# Patient Record
Sex: MALE | Race: WHITE | NOT HISPANIC OR LATINO
[De-identification: names, ages, dates, MRNs, and addresses within clinical notes are randomized per-mention and may not be internally consistent; named-entity substitution may affect disease eponyms.]

---

## 2024-05-30 ENCOUNTER — RESULT REVIEW (OUTPATIENT)
Age: 89
End: 2024-05-30

## 2024-06-06 PROBLEM — Z00.00 ENCOUNTER FOR PREVENTIVE HEALTH EXAMINATION: Status: ACTIVE | Noted: 2024-06-06

## 2024-06-19 ENCOUNTER — APPOINTMENT (OUTPATIENT)
Dept: CARDIOLOGY | Facility: CLINIC | Age: 89
End: 2024-06-19

## 2024-07-15 ENCOUNTER — INPATIENT (INPATIENT)
Facility: HOSPITAL | Age: 89
LOS: 14 days | Discharge: NURSING, REHAB OR LONG-TERM CARE | DRG: 321 | End: 2024-07-30
Attending: HOSPITALIST | Admitting: HOSPITALIST
Payer: MEDICARE

## 2024-07-15 VITALS
HEART RATE: 132 BPM | SYSTOLIC BLOOD PRESSURE: 141 MMHG | DIASTOLIC BLOOD PRESSURE: 75 MMHG | OXYGEN SATURATION: 96 % | RESPIRATION RATE: 24 BRPM | TEMPERATURE: 98 F

## 2024-07-15 LAB
BASOPHILS # BLD AUTO: 0.07 K/UL — SIGNIFICANT CHANGE UP (ref 0–0.2)
BASOPHILS NFR BLD AUTO: 0.6 % — SIGNIFICANT CHANGE UP (ref 0–2)
EOSINOPHIL # BLD AUTO: 0.32 K/UL — SIGNIFICANT CHANGE UP (ref 0–0.5)
EOSINOPHIL NFR BLD AUTO: 2.9 % — SIGNIFICANT CHANGE UP (ref 0–6)
HCT VFR BLD CALC: 43.1 % — SIGNIFICANT CHANGE UP (ref 39–50)
HGB BLD-MCNC: 14.4 G/DL — SIGNIFICANT CHANGE UP (ref 13–17)
IMM GRANULOCYTES NFR BLD AUTO: 0.5 % — SIGNIFICANT CHANGE UP (ref 0–0.9)
LYMPHOCYTES # BLD AUTO: 19 % — SIGNIFICANT CHANGE UP (ref 13–44)
LYMPHOCYTES # BLD AUTO: 2.1 K/UL — SIGNIFICANT CHANGE UP (ref 1–3.3)
MCHC RBC-ENTMCNC: 29.6 PG — SIGNIFICANT CHANGE UP (ref 27–34)
MCHC RBC-ENTMCNC: 33.4 GM/DL — SIGNIFICANT CHANGE UP (ref 32–36)
MCV RBC AUTO: 88.5 FL — SIGNIFICANT CHANGE UP (ref 80–100)
MONOCYTES # BLD AUTO: 0.76 K/UL — SIGNIFICANT CHANGE UP (ref 0–0.9)
MONOCYTES NFR BLD AUTO: 6.9 % — SIGNIFICANT CHANGE UP (ref 2–14)
NEUTROPHILS # BLD AUTO: 7.77 K/UL — HIGH (ref 1.8–7.4)
NEUTROPHILS NFR BLD AUTO: 70.1 % — SIGNIFICANT CHANGE UP (ref 43–77)
PLATELET # BLD AUTO: 279 K/UL — SIGNIFICANT CHANGE UP (ref 150–400)
RBC # BLD: 4.87 M/UL — SIGNIFICANT CHANGE UP (ref 4.2–5.8)
RBC # FLD: 15.1 % — HIGH (ref 10.3–14.5)
WBC # BLD: 11.07 K/UL — HIGH (ref 3.8–10.5)
WBC # FLD AUTO: 11.07 K/UL — HIGH (ref 3.8–10.5)

## 2024-07-15 PROCEDURE — 99053 MED SERV 10PM-8AM 24 HR FAC: CPT

## 2024-07-15 PROCEDURE — 99285 EMERGENCY DEPT VISIT HI MDM: CPT

## 2024-07-15 PROCEDURE — 70450 CT HEAD/BRAIN W/O DYE: CPT | Mod: 26,MC

## 2024-07-15 PROCEDURE — 93010 ELECTROCARDIOGRAM REPORT: CPT

## 2024-07-15 PROCEDURE — 70486 CT MAXILLOFACIAL W/O DYE: CPT | Mod: 26,MC

## 2024-07-15 PROCEDURE — 72125 CT NECK SPINE W/O DYE: CPT | Mod: 26,MC

## 2024-07-15 RX ORDER — DILTIAZEM HCL 90 MG
10 TABLET ORAL ONCE
Refills: 0 | Status: COMPLETED | OUTPATIENT
Start: 2024-07-15 | End: 2024-07-15

## 2024-07-15 RX ORDER — METOPROLOL TARTRATE 100 MG
5 TABLET ORAL ONCE
Refills: 0 | Status: DISCONTINUED | OUTPATIENT
Start: 2024-07-15 | End: 2024-07-15

## 2024-07-15 RX ORDER — ONDANSETRON HCL/PF 4 MG/2 ML
4 VIAL (ML) INJECTION ONCE
Refills: 0 | Status: COMPLETED | OUTPATIENT
Start: 2024-07-15 | End: 2024-07-15

## 2024-07-15 RX ORDER — ACETAMINOPHEN 500 MG
1000 TABLET ORAL ONCE
Refills: 0 | Status: COMPLETED | OUTPATIENT
Start: 2024-07-15 | End: 2024-07-15

## 2024-07-15 RX ADMIN — Medication 10 MILLIGRAM(S): at 23:42

## 2024-07-15 RX ADMIN — Medication 400 MILLIGRAM(S): at 23:59

## 2024-07-15 RX ADMIN — Medication 4 MILLIGRAM(S): at 23:35

## 2024-07-15 NOTE — ED PROVIDER NOTE - CARE PLAN
1 Principal Discharge DX:	Syncope  Secondary Diagnosis:	Subdural hematoma   Principal Discharge DX:	Syncope  Secondary Diagnosis:	Subdural hematoma  Secondary Diagnosis:	Atrial fibrillation with rapid ventricular response

## 2024-07-15 NOTE — ED PROVIDER NOTE - CHIEF COMPLAINT
Hub staff attempted to follow warm transfer process and was unsuccessful     Caller: SANDRA    Relationship to patient: HOME HEALTH     Best call back number:     Patient is needing: SANDRA WANTED MACHO TO KNOW THAT SHE TOLD THE PATIENT ABOUT THE APPT THAT WAS RESCHEDULED AND SHE TOLD HER SHE COULDN'T MAKE IT. SANDRA GAVE THE PATIENT THE PHONE NUMBER TO THE OFFICE TO CALL AND RESCHEDULE IF SHE NEEDS TO.     The patient is a 89y Male complaining of syncope.

## 2024-07-15 NOTE — ED PROVIDER NOTE - PHYSICAL EXAMINATION
General: NAD, well appearing  HEENT: bleeding from anterior nose and upper lip with loose upper central incisor with bleeding  Neck: No apparent stiffness or JVD  Pulm: Chest wall symmetric and nontender, lungs clear to ascultation   Cardiac: rapid and irregular heartrate, no murmurs rubs or gallops  Abdomen: Nontender and nondistended  Skin: Skin is warm, dry and intact without rashes or lesions.  Neuro: No motor or sensory deficits above reported baseline  MSK: No deformity or tenderness above reported baseline

## 2024-07-15 NOTE — ED ADULT NURSE NOTE - CHIEF COMPLAINT QUOTE
pt is a transfer from [facility] [place], pt had a syncopal episode and fell on his face.  has abrasions to face and upper extremities and a located finger.  pt has a questionable head bleed.  pt recently hospitalized at [facility] [place] for new onset afib. pt received cardizem, ofirmev and tetanus.  pt moved to

## 2024-07-15 NOTE — ED PROVIDER NOTE - OBJECTIVE STATEMENT
Pt is an 89 yo male with PMH of afib on eliquis, hx of multiple syncopes this month presenting as a transfer from [facility] [place] for sudural hematoma. Pt was in front of the sink when he felt dizzy and lightheaded and turned around and fell face forward onto the ground with LOC. Pt reports pain in upper mouth with loose tooth. Pt presented with afib with rvr with stable BP with associated nausea and vomiting. Pt denies fever, SOB, chest pain, palpitations, abdominal pain, back pain, neck pain.

## 2024-07-15 NOTE — ED PROVIDER NOTE - ATTENDING CONTRIBUTION TO CARE
89 yo male with PMH of afib on Eliquis, hx of multiple syncope episodes this month presenting as a transfer from [facility] for subdural hematoma. Pt was in front of the sink when he felt dizzy and lightheaded. He turned to sit down and fell face forward onto the ground with LOC. Pt reports pain in upper mouth with loose Pt states he has been well denies fever, SOB, chest pain, palpitations, abdominal pain, back pain, neck pain, GI symptoms or new concerns.    I, Hayley Giles, personally saw the patient with the resident, and completed the key components of the history and physical exam. I then discussed the management plan with the resident.

## 2024-07-15 NOTE — ED ADULT NURSE NOTE - NSFALLHARMRISKINTERV_ED_ALL_ED

## 2024-07-15 NOTE — ED ADULT TRIAGE NOTE - CHIEF COMPLAINT QUOTE
[place] [facility] [place]opal episode and fell on his face.  has abrasions to face and upper extremities and a located finger.  pt has a questionable head bleed.  [place] [facility] [place]. pt received cardizem, ofirmev and tetanus.  pt moved to

## 2024-07-15 NOTE — ED ADULT NURSE NOTE - OBJECTIVE STATEMENT
Pt. received A+Ox4, NAD, BIBEMS after falling and being brought [place] [facility] [place]. As per EMS pt. had syncopal episode leading to fall. Pt. is on eliquis. Pt. with splinted L middle finger, tooth impaction in mouth, transferred for MRI and trauma eval. Pt. with no gross deficits. Pt. received A+Ox4, NAD, BIBEMS after falling and [place] [facility] [place] pt. had syncopal episode leading to fall. Pt. is on eliquis. Pt. with splinted L middle finger, tooth impaction in mouth, transferred for MRI and trauma eval. Pt. with no gross deficits. Pt. in a fib on monitor, MD aware.

## 2024-07-15 NOTE — ED PROVIDER NOTE - CLINICAL SUMMARY MEDICAL DECISION MAKING FREE TEXT BOX
Pt is an 88 yo male with PMH of afib on eliquis, hx of multiple syncopes this month presenting as a transfer [place] [facility] [place]. Pt was in front of the sink when he felt dizzy and lightheaded and turned around and fell face forward onto the ground with LOC. Pt reports pain in upper mouth with loose tooth. Pt also had a L middle finger dislocation. Pt presented with afib with rvr with stable BP with associated nausea and vomiting. Pt denies fever, SOB, chest pain, palpitations, abdominal pain, back pain, neck pain.     VS shows irregular fast HR, ekg shows afib with rvr, hypertensive, satting well, afebrile. neuro intact, with lacerations to nose and inner upper mouth and lip.     syncope workup, acs, CT priority head as per trauma team after their initial assessment, NS evaluated pt and ct imaging, recommending after ct head after 6hours. Pt is an 88 yo male with PMH of afib on eliquis, hx of multiple syncopes this month [place] [facility] [place] Pt was in front of the sink when he felt dizzy and lightheaded and turned around and fell face forward onto the ground with LOC. Pt reports pain in upper mouth with loose tooth. Pt also had a L middle finger dislocation. Pt presented with afib with rvr with stable BP with associated nausea and vomiting. Pt denies fever, SOB, chest pain, palpitations, abdominal pain, back pain, neck pain.     VS shows irregular fast HR, ekg shows afib with rvr, hypertensive, satting well, afebrile. neuro intact, with lacerations to nose and inner upper mouth and lip.     syncope workup, acs, CT priority head as per trauma team after their initial assessment, NS evaluated pt and ct imaging, recommending after ct head after 6hours.    Admitted for syncope workup to medicine. Neurosurgery consulted and recommending q2h neurochecks.

## 2024-07-16 DIAGNOSIS — R55 SYNCOPE AND COLLAPSE: ICD-10-CM

## 2024-07-16 LAB
A1C WITH ESTIMATED AVERAGE GLUCOSE RESULT: 6.4 % — HIGH (ref 4–5.6)
ALBUMIN SERPL ELPH-MCNC: 3.6 G/DL — SIGNIFICANT CHANGE UP (ref 3.3–5.2)
ALP SERPL-CCNC: 64 U/L — SIGNIFICANT CHANGE UP (ref 40–120)
ALT FLD-CCNC: 11 U/L — SIGNIFICANT CHANGE UP
ANION GAP SERPL CALC-SCNC: 16 MMOL/L — SIGNIFICANT CHANGE UP (ref 5–17)
APPEARANCE UR: ABNORMAL
AST SERPL-CCNC: 19 U/L — SIGNIFICANT CHANGE UP
BACTERIA # UR AUTO: ABNORMAL /HPF
BILIRUB SERPL-MCNC: 0.7 MG/DL — SIGNIFICANT CHANGE UP (ref 0.4–2)
BILIRUB UR-MCNC: NEGATIVE — SIGNIFICANT CHANGE UP
BUN SERPL-MCNC: 17 MG/DL — SIGNIFICANT CHANGE UP (ref 8–20)
CALCIUM SERPL-MCNC: 9 MG/DL — SIGNIFICANT CHANGE UP (ref 8.4–10.5)
CHLORIDE SERPL-SCNC: 99 MMOL/L — SIGNIFICANT CHANGE UP (ref 96–108)
CO2 SERPL-SCNC: 23 MMOL/L — SIGNIFICANT CHANGE UP (ref 22–29)
COLOR SPEC: ABNORMAL
CREAT SERPL-MCNC: 1.05 MG/DL — SIGNIFICANT CHANGE UP (ref 0.5–1.3)
DIFF PNL FLD: ABNORMAL
EGFR: 69 ML/MIN/1.73M2 — SIGNIFICANT CHANGE UP
ESTIMATED AVERAGE GLUCOSE: 137 MG/DL — HIGH (ref 68–114)
GLUCOSE SERPL-MCNC: 126 MG/DL — HIGH (ref 70–99)
GLUCOSE UR QL: NEGATIVE MG/DL — SIGNIFICANT CHANGE UP
HCT VFR BLD CALC: 44 % — SIGNIFICANT CHANGE UP (ref 39–50)
HGB BLD-MCNC: 14.2 G/DL — SIGNIFICANT CHANGE UP (ref 13–17)
KETONES UR-MCNC: NEGATIVE MG/DL — SIGNIFICANT CHANGE UP
LEUKOCYTE ESTERASE UR-ACNC: ABNORMAL
MCHC RBC-ENTMCNC: 29.5 PG — SIGNIFICANT CHANGE UP (ref 27–34)
MCHC RBC-ENTMCNC: 32.3 GM/DL — SIGNIFICANT CHANGE UP (ref 32–36)
MCV RBC AUTO: 91.5 FL — SIGNIFICANT CHANGE UP (ref 80–100)
NITRITE UR-MCNC: NEGATIVE — SIGNIFICANT CHANGE UP
OB PNL STL: NEGATIVE — SIGNIFICANT CHANGE UP
PH UR: 6 — SIGNIFICANT CHANGE UP (ref 5–8)
PLATELET # BLD AUTO: 237 K/UL — SIGNIFICANT CHANGE UP (ref 150–400)
POTASSIUM SERPL-MCNC: 3.8 MMOL/L — SIGNIFICANT CHANGE UP (ref 3.5–5.3)
POTASSIUM SERPL-SCNC: 3.8 MMOL/L — SIGNIFICANT CHANGE UP (ref 3.5–5.3)
PROT SERPL-MCNC: 7.4 G/DL — SIGNIFICANT CHANGE UP (ref 6.6–8.7)
PROT UR-MCNC: SIGNIFICANT CHANGE UP MG/DL
RBC # BLD: 4.81 M/UL — SIGNIFICANT CHANGE UP (ref 4.2–5.8)
RBC # FLD: 15.4 % — HIGH (ref 10.3–14.5)
RBC CASTS # UR COMP ASSIST: 30 /HPF — SIGNIFICANT CHANGE UP (ref 0–4)
SODIUM SERPL-SCNC: 138 MMOL/L — SIGNIFICANT CHANGE UP (ref 135–145)
SP GR SPEC: 1.01 — SIGNIFICANT CHANGE UP (ref 1–1.03)
TROPONIN T, HIGH SENSITIVITY RESULT: 42 NG/L — SIGNIFICANT CHANGE UP (ref 0–51)
TROPONIN T, HIGH SENSITIVITY RESULT: 42 NG/L — SIGNIFICANT CHANGE UP (ref 0–51)
UROBILINOGEN FLD QL: 1 MG/DL — SIGNIFICANT CHANGE UP (ref 0.2–1)
WBC # BLD: 11.27 K/UL — HIGH (ref 3.8–10.5)
WBC # FLD AUTO: 11.27 K/UL — HIGH (ref 3.8–10.5)
WBC UR QL: 35 /HPF — SIGNIFICANT CHANGE UP (ref 0–5)

## 2024-07-16 PROCEDURE — 99233 SBSQ HOSP IP/OBS HIGH 50: CPT

## 2024-07-16 PROCEDURE — 99231 SBSQ HOSP IP/OBS SF/LOW 25: CPT

## 2024-07-16 PROCEDURE — 70450 CT HEAD/BRAIN W/O DYE: CPT | Mod: 26,MC

## 2024-07-16 PROCEDURE — 71045 X-RAY EXAM CHEST 1 VIEW: CPT | Mod: 26

## 2024-07-16 RX ORDER — PANTOPRAZOLE SODIUM 20 MG/1
40 TABLET, DELAYED RELEASE ORAL
Refills: 0 | Status: DISCONTINUED | OUTPATIENT
Start: 2024-07-16 | End: 2024-07-30

## 2024-07-16 RX ORDER — PANTOPRAZOLE SODIUM 20 MG/1
40 TABLET, DELAYED RELEASE ORAL ONCE
Refills: 0 | Status: COMPLETED | OUTPATIENT
Start: 2024-07-16 | End: 2024-07-16

## 2024-07-16 RX ORDER — SACUBITRIL AND VALSARTAN 49; 51 MG/1; MG/1
1 TABLET, FILM COATED ORAL
Refills: 0 | Status: DISCONTINUED | OUTPATIENT
Start: 2024-07-16 | End: 2024-07-19

## 2024-07-16 RX ORDER — MELATONIN 3 MG
5 TABLET ORAL ONCE
Refills: 0 | Status: COMPLETED | OUTPATIENT
Start: 2024-07-16 | End: 2024-07-16

## 2024-07-16 RX ORDER — APIXABAN 5 MG/1
2.5 TABLET, FILM COATED ORAL
Refills: 0 | Status: DISCONTINUED | OUTPATIENT
Start: 2024-07-16 | End: 2024-07-18

## 2024-07-16 RX ORDER — FUROSEMIDE 10 MG/ML
20 INJECTION, SOLUTION INTRAVENOUS DAILY
Refills: 0 | Status: DISCONTINUED | OUTPATIENT
Start: 2024-07-16 | End: 2024-07-16

## 2024-07-16 RX ORDER — DIGOXIN 125 MCG
500 TABLET ORAL ONCE
Refills: 0 | Status: COMPLETED | OUTPATIENT
Start: 2024-07-16 | End: 2024-07-16

## 2024-07-16 RX ORDER — ACETAMINOPHEN 500 MG
650 TABLET ORAL ONCE
Refills: 0 | Status: COMPLETED | OUTPATIENT
Start: 2024-07-16 | End: 2024-07-16

## 2024-07-16 RX ORDER — SPIRONOLACTONE 50 MG/1
25 TABLET, FILM COATED ORAL DAILY
Refills: 0 | Status: DISCONTINUED | OUTPATIENT
Start: 2024-07-16 | End: 2024-07-30

## 2024-07-16 RX ORDER — SACUBITRIL AND VALSARTAN 49; 51 MG/1; MG/1
1 TABLET, FILM COATED ORAL
Refills: 0 | Status: DISCONTINUED | OUTPATIENT
Start: 2024-07-16 | End: 2024-07-16

## 2024-07-16 RX ORDER — DILTIAZEM HCL 90 MG
90 TABLET ORAL THREE TIMES A DAY
Refills: 0 | Status: DISCONTINUED | OUTPATIENT
Start: 2024-07-16 | End: 2024-07-16

## 2024-07-16 RX ORDER — METOPROLOL TARTRATE 100 MG
25 TABLET ORAL EVERY 6 HOURS
Refills: 0 | Status: DISCONTINUED | OUTPATIENT
Start: 2024-07-16 | End: 2024-07-18

## 2024-07-16 RX ADMIN — APIXABAN 2.5 MILLIGRAM(S): 5 TABLET, FILM COATED ORAL at 17:27

## 2024-07-16 RX ADMIN — Medication 25 MILLIGRAM(S): at 23:57

## 2024-07-16 RX ADMIN — PANTOPRAZOLE SODIUM 40 MILLIGRAM(S): 20 TABLET, DELAYED RELEASE ORAL at 22:20

## 2024-07-16 RX ADMIN — Medication 500 MICROGRAM(S): at 17:27

## 2024-07-16 RX ADMIN — Medication 1000 MILLIGRAM(S): at 06:11

## 2024-07-16 RX ADMIN — Medication 5 MILLIGRAM(S): at 22:20

## 2024-07-16 RX ADMIN — Medication 1000 MILLIGRAM(S): at 17:37

## 2024-07-16 RX ADMIN — Medication 25 MILLIGRAM(S): at 17:27

## 2024-07-16 NOTE — PROGRESS NOTE ADULT - ASSESSMENT
89M with multiple syncope episodes over the past month, fell last night and has facial abrasions, swelling, broken left fingers, and b/l hygromas no acute hemorrhage     Plan  - No absolute contraindication for Eliquis given no acute hemorrhage. Primary team to weigh risks / benefits of AC and fall risk.   - No anti epileptic drug needed   - Neurosurgery signing off please reconsult as needed. Pt can follow up with Dr. Chicas as as outpatient in 1 month.   - Syncope work up per medicine / cardiology   - Trauma eval noted

## 2024-07-16 NOTE — PATIENT PROFILE ADULT - NSTRANSFERBELONGINGSDISPO_GEN_A_NUR
Detail Level: Detailed
Quality 130: Documentation Of Current Medications In The Medical Record: Current Medications Documented
with patient

## 2024-07-16 NOTE — H&P ADULT - NSHPPHYSICALEXAM_GEN_ALL_CORE
GENERAL: NAD, bruised face face nose forehead and lips  HEAD:  traumatic,   EYES: EOMI, PERRL, conjunctiva and sclera clear  ENMT: No tonsillar erythema, exudates, or enlargement; Moist mucous membranes  NECK: Supple,  NERVOUS SYSTEM:  Alert & Oriented X3, but poor historian Good concentration; Moves 5/5 B/L upper and lower extremities;  CHEST/LUNG: Clear to percussion bilaterally; No rales, rhonchi, wheezing,   HEART: Regular rate and rhythm; No murmurs, rubs, or gallops  ABDOMEN: Soft, Nontender, Nondistended; Bowel sounds present  EXTREMITIES:  2+ Peripheral Pulses, No clubbing, cyanosis, \

## 2024-07-16 NOTE — CONSULT NOTE ADULT - NS ATTEND AMEND GEN_ALL_CORE FT
I have seen and examined this patient.  Agree with the above.  89M presents with syncopal fall.   Found to have bilateral hygromas on CTH.  Patient can be admitted to medical service for syncopal workup.  Hand surgery consult needed for dislocated finger (reduced by [facility] ED).

## 2024-07-16 NOTE — CONSULT NOTE ADULT - ASSESSMENT
88 yo male with PMH of afib on eliquis, hx of multiple syncopes this month [place] [facility] [place]gromas/hematoma. Pt was in front of the sink when he felt dizzy and lightheaded and turned around and fell face forward onto the ground with +LOC and +HS. CTH obtained upon arrival.    Plan:  - Q2 neuro checks  - All imaging reviewed: CTH upon arrival:  89 yo male with PMH of afib on eliquis, hx of multiple syncopes this month [place] [facility] [place]grom[place]. Pt was in front of the sink when he felt dizzy and lightheaded and turned around and fell face forward onto the ground with +LOC and +HS. CTH obtained upon arrival.    Plan:  - Q2 neuro checks  - SBP <160  - All imaging reviewed: CTH upon arrival: Stable size of b/l hypoattenuating subdural collections measuring up to 6 mm. Although the density of these collections appear minimally increased, there still hypoattenuating, and the apparent difference and density compared with prior may be artifactual.  - Recommend 6 hour CTH (6:45AM)  - STAT CTH if change in neuro exam  - Hold Eliquis and all other AC/AP  - Trauma eval  - Medicine consult for syncope w/u  - Further medical management per primary team   - DVT ppx: SCDs only  - Discussed case with Dr. Chicas

## 2024-07-16 NOTE — CONSULT NOTE ADULT - SUBJECTIVE AND OBJECTIVE BOX
TRAUMA SURGERY CONSULT  HPI reviewed as below.  89 yo M s/p syncopal episode with HS and LOC hitting his face.  Complaints mostly of lip pain.  [place] [facility] [place] vs chronic [place].     A: Intact, speaking in full sentences  B: Bilateral breath sounds  C: Palpable central and peripheral pulses, no evidence of active bleeding.  D: GCS 15, no evident deficits  E: Patient exposed, no other evident injuries identified     ==============================================================================================================  HPI: Pt is an 89 yo male with PMH of afib on eliquis, hx of multiple syncopes this month presenting [place] [facility] [place]gromas/hematoma. Pt was in front of the sink when he felt dizzy and lightheaded and turned around and fell face forward onto the ground with +LOC and +HS. Pt reports pain in upper mouth with loose tooth. Denies headache, neck pain, vision changes, weakness, numbness, tingling. Pt presented with afib with rvr with stable BP with associated nausea and vomiting. CTH obtained upon arrival.    PAST MEDICAL & SURGICAL HISTORY:  Afib        Home Meds: Home Medications:    Allergies: Allergies    penicillins (Unknown)    Intolerances      Soc:   Advanced Directives: Presumed Full Code     CURRENT MEDICATIONS:   --------------------------------------------------------------------------------------  Neurologic Medications    Respiratory Medications    Cardiovascular Medications    Gastrointestinal Medications    Genitourinary Medications    Hematologic/Oncologic Medications    Antimicrobial/Immunologic Medications    Endocrine/Metabolic Medications    Topical/Other Medications    --------------------------------------------------------------------------------------    VITAL SIGNS, INS/OUTS (last 24 hours):  --------------------------------------------------------------------------------------  ICU Vital Signs Last 24 Hrs  T(C): 36.6 (15 Jul 2024 22:55), Max: 36.6 (15 Jul 2024 22:55)  T(F): 97.8 (15 Jul 2024 22:55), Max: 97.8 (15 Jul 2024 22:55)  HR: 75 (16 Jul 2024 02:00) (75 - 132)  BP: 111/62 (16 Jul 2024 02:00) (111/62 - 141/75)  BP(mean): 78 (16 Jul 2024 02:00) (78 - 78)  ABP: --  ABP(mean): --  RR: 16 (16 Jul 2024 02:00) (16 - 24)  SpO2: 96% (16 Jul 2024 02:00) (96% - 96%)    O2 Parameters below as of 16 Jul 2024 02:00  Patient On (Oxygen Delivery Method): room air          I&O's Summary    --------------------------------------------------------------------------------------    PHYSICAL EXAM:   General: NAD, Lying in bed comfortably  Neuro: A+Ox3  HEENT: nasal tenderness, upper lip swelling and loose tooth, EOMI  Neck: Soft, supple, no c-spine tenderness  Cardio: IRR  Resp: Good effort, CTA b/l  Thorax: No chest wall tenderness  GI/Abd: Soft, NT/ND, no rebound/guarding, no masses palpated  Vascular: All 4 extremities warm.  Skin: abrasions ot the dorsum of the nose  Pelvis: stable  Musculoskeletal: All 4 extremities moving spontaneously, no limitations, no spinal tenderness or stepoffs          LABS  --------------------------------------------------------------------------------------  Labs:  CAPILLARY BLOOD GLUCOSE                              14.4   11.07 )-----------( 279      ( 15 Jul 2024 23:26 )             43.1       Auto Neutrophil %: 70.1 % (07-15-24 @ 23:26)  Auto Immature Granulocyte %: 0.5 % (07-15-24 @ 23:26)    07-15    138  |  99  |  17.0  ----------------------------<  126<H>  3.8   |  23.0  |  1.05      Calcium: 9.0 mg/dL (07-15-24 @ 23:26)      LFTs:             7.4  | 0.7  | 19       ------------------[64      ( 15 Jul 2024 23:26 )  3.6  | x    | 11          Lipase:x      Amylase:x             Coags:            Urinalysis Basic - ( 15 Jul 2024 23:26 )    Color: x / Appearance: x / SG: x / pH: x  Gluc: 126 mg/dL / Ketone: x  / Bili: x / Urobili: x   Blood: x / Protein: x / Nitrite: x   Leuk Esterase: x / RBC: x / WBC x   Sq Epi: x / Non Sq Epi: x / Bacteria: x          --------------------------------------------------------------------------------------

## 2024-07-16 NOTE — ED ADULT NURSE REASSESSMENT NOTE - NS ED NURSE REASSESS COMMENT FT1
Pt is resting in stretcher comfortably at this time, no apparent distress noted at this time. Pt safety maintained. Pt denies any complaints at this time. Pt updated on plan of care.

## 2024-07-16 NOTE — ED ADULT NURSE REASSESSMENT NOTE - NS ED NURSE REASSESS COMMENT FT1
assumed care of pt at 0730. report received from bayron le. charting as noted. iv intact. pt has a seth in place. continued cardiac monitoring in place, noted to be I rapid afib in 130's. MD Giles made aware. anox4. rr even and unlabored. awaiting admission bed. pt educated on plan of care, pt able to successfully teach back plan of care to RN, RN will continue to reeducate pt during hospital stay.

## 2024-07-16 NOTE — H&P ADULT - ASSESSMENT
89 year old male with known Afib presented to [facility] with syncope - found to have a     SDH stable repeat ct scan - no intervention per neurosurger     Afib with RVR-HR of 120-150s - received lopressor and Diltiazem in ED  89 year old male with known Afib presented to [facility] with syncope - found to have a     SDH stable repeat ct scan - no intervention per neurosurgery   discussed wiht Nsx - NO Antiseizure meds needed     Afib/Aflut with RVR- HR of 120-150s - received lopressor and Diltiazem in ED   Echo with CMP and severe LV dysfunction in the setting of AF  Will get TSH in AM   Discussed with Cardiology- add metoprolol and iv digoxin  Defer amiodarone at this time as pt off anticoagulation to reduce the risk of conversion  NOT ON AC - SO no DCCV  Telemetry monitoring    Leukocytosis- Dirty Urine - possible UTI and fall- will strat Rocephin and check culture and trend WBC     DVT PPX- venodyne boots    Gi PPX- PPI  89 year old male with known Afib presented to [facility] with syncope - found to have a     SDH stable repeat ct scan - no intervention per neurosurgery   discussed wiht Nsx - NO Antiseizure meds needed     Afib/Aflut with RVR- HR of 120-150s - received lopressor and Diltiazem in ED   at home is on Eliquis and Entresto and spironolactone and lasix - will start with parameters home meds but will hold Lasix - with hypotension   as per daughter his EF is less than 15 %  Echo with CMP and severe LV dysfunction in the setting of AF  Will get TSH in AM   Discussed with Cardiology- add metoprolol and iv digoxin  Defer amiodarone at this time as pt off anticoagulation to reduce the risk of conversion  NOT ON AC - SO no DCCV  Telemetry monitoring    Leukocytosis- Dirty Urine - possible UTI and fall- will start Rocephin and check culture and trend WBC     DVT PPX- eliquis    Gi PPX- PPI

## 2024-07-16 NOTE — CHART NOTE - NSCHARTNOTEFT_GEN_A_CORE
RN states stool appeared to be blood streaked  Pt A+OX3  Pt states he has never looked at his stool  Denies h/o hemorrhoids or blood in stool   Pt without any complaints  Denies abd pain or discomfort  VSS B/P 110/56 P 101 R 18 PO 96% T 98.4  Stool does not appear to have blood in it  Occult feces negative  H/H 14.2/44  Continue to monitor

## 2024-07-16 NOTE — CHART NOTE - NSCHARTNOTEFT_GEN_A_CORE
Pt with stable repeat Non con head CT this morning with no changes   Pt admitted to medicine for syncopal workout   No trauma surgery intervention at this time / no tertiary needed     Trauma surgery to sign off - please re consult for any needs

## 2024-07-16 NOTE — CONSULT NOTE ADULT - ASSESSMENT
89 yo M s/p syncopal episode with multipl others.   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  No evidence of other injuries on physical exam.    Plan:  No further trauma work-up required  F/U NSx recs  Syncopal work-up  Call if any questions

## 2024-07-16 NOTE — CONSULT NOTE ADULT - SUBJECTIVE AND OBJECTIVE BOX
HPI:  Pt is an 88 yo male with PMH of afib on eliquis, hx of multiple syncopes this month presenting as a transfer from [facility] [place] for b/l subdural hygromas/hematoma. Pt was in front of the sink when he felt dizzy and lightheaded and turned around and fell face forward onto the ground with +LOC and +HS. Pt reports pain in upper mouth with loose tooth. Denies headache, neck pain, vision changes, weakness, numbness, tingling. Pt presented with afib with rvr with stable BP with associated nausea and vomiting. CTH obtained upon arrival.    PAST MEDICAL & SURGICAL HISTORY:  Afib    Allergies  penicillins (Unknown)    REVIEW OF SYSTEMS  Negative except as noted in HPI    Vital Signs Last 24 Hrs  T(C): 36.6 (15 Jul 2024 22:55), Max: 36.6 (15 Jul 2024 22:55)  T(F): 97.8 (15 Jul 2024 22:55), Max: 97.8 (15 Jul 2024 22:55)  HR: 132 (15 Jul 2024 22:55) (132 - 132)  BP: 141/75 (15 Jul 2024 22:55) (141/75 - 141/75)  BP(mean): --  RR: 24 (15 Jul 2024 22:55) (24 - 24)  SpO2: 96% (15 Jul 2024 22:55) (96% - 96%)    Parameters below as of 15 Jul 2024 22:55  Patient On (Oxygen Delivery Method): room air    PHYSICAL EXAM:  GENERAL: NAD  HEAD: Lacerations to nose, inner upper mouth, and lip  NECK: Supple, nontender to palpation  HUNTER COMA SCORE: E-4 V-5 M-6 = 15  MENTAL STATUS: AAO x3; Awake; Opens eyes spontaneously; Appropriately conversant without aphasia; following commands  CRANIAL NERVES: PERRL. EOMI without nystagmus. Facial sensation intact V1-3 distribution b/l. Face symmetric w/ normal eye closure and smile, tongue midline. Hearing grossly intact. Speech clear.   MOTOR: strength 5/5 b/l upper and lower extremities  SENSATION: grossly intact to light touch all extremities  CHEST/LUNG: Nonlabored breathing  SKIN: Warm, dry    LABS:                        14.4   11.07 )-----------( 279      ( 15 Jul 2024 23:26 )             43.1     07-15    138  |  99  |  17.0  ----------------------------<  126<H>  3.8   |  23.0  |  1.05    Ca    9.0      15 Jul 2024 23:26    TPro  7.4  /  Alb  3.6  /  TBili  0.7  /  DBili  x   /  AST  19  /  ALT  11  /  AlkPhos  64  07-15    Urinalysis Basic - ( 15 Jul 2024 23:26 )    Color: x / Appearance: x / SG: x / pH: x  Gluc: 126 mg/dL / Ketone: x  / Bili: x / Urobili: x   Blood: x / Protein: x / Nitrite: x   Leuk Esterase: x / RBC: x / WBC x   Sq Epi: x / Non Sq Epi: x / Bacteria: x    RADIOLOGY & ADDITIONAL STUDIES:  < from: CT Head No Cont (07.15.24 @ 23:53) >  IMPRESSION:    CT HEAD:  Mild motion artifact. Stable size of bilateral hypoattenuating subdural   collections measuring up to 6 mm. Although the density of these   collections appear minimally increased, there still hypoattenuating, and   the apparent difference and density compared with prior may be   artifactual. No definite acute intracranial hemorrhage is seen.    CT MAXILLOFACIAL:  Motion artifact, with question linear lucency through the medial left   maxillary incisor which may reflect artifact versus fracture. Otherwise,   no gross evidence of acute facial fracture within the limitations of the   study.    CT CERVICAL SPINE:  No acute fracture or traumatic subluxation.  Multi-level degenerative changes.

## 2024-07-16 NOTE — PATIENT PROFILE ADULT - FALL HARM RISK - HARM RISK INTERVENTIONS

## 2024-07-16 NOTE — PROGRESS NOTE ADULT - SUBJECTIVE AND OBJECTIVE BOX
HPI: Pt is an 89 yo male with PMH of afib on eliquis, hx of multiple syncopes this month presenting as a transfer from [facility] [place] [place] for b/l subdural hygromas/hematoma. Pt was in front of the sink when he felt dizzy and lightheaded and turned around and fell face forward onto the ground with +LOC and +HS. Pt reports pain in upper mouth with loose tooth. Denies headache, neck pain, vision changes, weakness, numbness, tingling. Pt presented with afib with rvr with stable BP with associated nausea and vomiting. CTH obtained upon arrival.    Interval History: patient seen and examined, denies headache, nausea, vomiting, vision changes. Complains of facial pain and left hand pain. Repeat CT scan obtained which is stable without evidence of acute hemorrhage     Vital Signs Last 24 Hrs  T(C): 36.4 (16 Jul 2024 08:45), Max: 36.7 (16 Jul 2024 07:09)  T(F): 97.6 (16 Jul 2024 08:45), Max: 98 (16 Jul 2024 07:09)  HR: 80 (16 Jul 2024 08:45) (75 - 132)  BP: 119/76 (16 Jul 2024 08:45) (110/70 - 141/75)  BP(mean): 78 (16 Jul 2024 02:00) (78 - 78)  RR: 15 (16 Jul 2024 08:45) (14 - 24)  SpO2: 97% (16 Jul 2024 08:45) (95% - 98%)    Parameters below as of 16 Jul 2024 08:45  Patient On (Oxygen Delivery Method): room air      Physical Exam:     Constitutional: NAD  	Neuro  	* Mental Status:  GCS 15: E4, V5, M6. Awake, alert, oriented to conversation. no aphasia   	* Cranial Nerves: Cnii-Cnxii grossly intact. no field cut.   	* Motor: RUE 5/5, LUE 5/5 left hand limited by pain (fracture), RLE 5/5, LLE 5/5  	* Sensory: Sensation intact to light touch  	* Reflexes: Not assessed  	* Gait: Not assessed              Head: abrasions and swelling to nose, upper lip, mouth   	Cardiovascular:  afib rate in the 130s  	Eyes: See neurologic examination with detailed examination of eyes.  	Respiratory: nonlabored   	Gastrointestinal: Soft, nontender, nondistended.  	Genitourinary: male +seth  	  < from: CT Head No Cont (07.16.24 @ 05:56) >  VENTRICLES AND SULCI: Mild prominence of the ventricles and cortical   sulci, nonspecific, likely reflects parenchymal volume loss.  INTRA-AXIAL: No intraparenchymal mass, acute hemorrhage, or midline   shift.  Scattered foci of white matter hypoattenuation without associated   mass effect, nonspecific, likely chronic microvascular disease. Skull   base vascular calcifications.  EXTRA-AXIAL: Stable size and appearance of bilateral convexity hypodense   subdural collections measuring up to 6 mm. A cyst minimal mass effect. No   midline shift or herniation. No mass. Basal cisterns are normal in   appearance.    VISUALIZED SINUSES:  Clear.  TYMPANOMASTOID CAVITIES:  Clear.  VISUALIZED ORBITS: Normal.  CALVARIUM: Intact.    MISCELLANEOUS: None.      IMPRESSION:  No significant change.    Redemonstrated hypodense bilateral subdural collections measuring up to 6   mm.        < end of copied text >

## 2024-07-16 NOTE — CONSULT NOTE ADULT - SUBJECTIVE AND OBJECTIVE BOX
[facility], THE [facility]ART [facility]ENTER                              [address] [address]                                                 PHONE: [phone]                                                 FAX: [phone]  ------------------------------------------------------------------------------------------------    Chief complaint: syncope    89y Male with past medical history as under [facility] for b/l subdural hygromas/hematoma. Pt was in front of the sink when he felt dizzy and lightheaded and turned around and fell face forward onto the ground with +LOC and +HS.   Pt reports multiple falls over the past month. Pt presented with afib with rvr with stable BP with associated nausea and vomiting. He denies any prior cardiac history.   At the time of evaluation, pt reports no palpitations. He was noted to be in AF/AFL with RVR    RELEVANT PHYSICAL EXAM:  Neck: No obvious JVD  Cardiovascular: irregular S1, S2  Respiratory: Lungs clear to auscultation; no crepitations, no wheeze  Musculoskeletal: No edema    Vital Signs Last 24 Hrs  T(C): 36.4 (16 Jul 2024 08:45), Max: 36.7 (16 Jul 2024 07:09)  T(F): 97.6 (16 Jul 2024 08:45), Max: 98 (16 Jul 2024 07:09)  HR: 80 (16 Jul 2024 08:45) (75 - 132)  BP: 119/76 (16 Jul 2024 08:45) (110/70 - 141/75)  BP(mean): 78 (16 Jul 2024 02:00) (78 - 78)  RR: 15 (16 Jul 2024 08:45) (14 - 24)  SpO2: 97% (16 Jul 2024 08:45) (95% - 98%)    Parameters below as of 16 Jul 2024 08:45  Patient On (Oxygen Delivery Method): room air        PAST MEDICAL & SURGICAL HISTORY:  Afib          penicillins (Unknown)      LABS:                        14.4   11.07 )-----------( 279      ( 15 Jul 2024 23:26 )             43.1     07-15    138  |  99  |  17.0  ----------------------------<  126<H>  3.8   |  23.0  |  1.05    Ca    9.0      15 Jul 2024 23:26    TPro  7.4  /  Alb  3.6  /  TBili  0.7  /  DBili  x   /  AST  19  /  ALT  11  /  AlkPhos  64  07-15    RADIOLOGY & ADDITIONAL STUDIES: (reviewed)  CT was independently visualized/reviewed and demonstrated: No significant change.    Redemonstrated hypodense bilateral subdural collections measuring up to 6   mm.    CARDIOLOGY TESTING:(reviewed)     ECG (Independent visualization): AF    ECHOCARDIOGRAM :  1. Left ventricular cavity is normal in size. Left ventricular systolic function is severely decreased with an ejection fraction visually estimated at 15 %. Global left ventricular hypokinesis.  2. There is moderate (grade 2) left ventricular diastolic dysfunction. Analysis of left ventricular diastolic function and filling pressure is made challenging by the presence of atrial fibrillation.  3. Normal right ventricular cavity size and reduced systolic function.  4. Mild pulmonic regurgitation.  5. Moderate mitral regurgitation.  6. Mild aortic stenosis. low flow, low gradient aortic stenosis with reduced EF.  7. Mild to moderate aortic regurgitation.  8. Moderate tricuspid regurgitation.  9. The inferior vena cava is dilated measuring 2.20 cm in diameter, (dilated >2.1cm) with normal inspiratory collapse (normal >50%) consistent with mildly elevated right atrial pressure (\R\8, range 5-10mmHg).  10. Estimated pulmonary artery systolic pressure is 22 mmHg, consistent with normal pulmonary artery pressure.  11. Small circumferential pericardial effusion.  12. Small right pleural effusion noted.    TELEMETRY independently visualized/reviewed and demonstrated : AF/AFL    MEDICATIONS:(reviewed)  Home Medications:  MEDICATIONS  (STANDING):  MEDICATIONS  (STANDING):  acetaminophen     Tablet .. 650 milliGRAM(s) Oral once  digoxin  Injectable 500 MICROGram(s) IV Push once  metoprolol tartrate 25 milliGRAM(s) Oral every 6 hours    MEDICATIONS  (PRN):      ASSESSMENT AND PLAN:    89y Male with AF with RVR presented to [facility] with syncope - found to have a SDH and in HR of 120-150s - received lopressor and Diltiazem in ED. Now noted to be in AF/AFL with RVR  Echo with CMP and severe LV dysfunction in the setting of AF    Will add po metoprolol and iv digoxin  Defer amiodarone at this time as pt off anticoagulation to reduce the risk of conversion  No HF symptoms at this time  Will add medications for CMP pending hospital course although suspect CMP is likely related to AF. Unable to offer CVN as pt off anticoagulation.  Defer to neurosurgery if/when pt can restart anticoagulation  Telemetry monitoring    Plan d/w Medicine team

## 2024-07-16 NOTE — H&P ADULT - HISTORY OF PRESENT ILLNESS
89 year old male with known Afib presented to [facility] with syncope - found to have a SDH and in HR of 120-150s - received lopressor and Diltiazem in ED   doesnt know who his cardiologist dened PC or orthopnea or VELA or CP or edema   he was by the sink when he passed out  89 year old male with known Afib presented to [facility] with syncope - found to have a SDH and in HR of 120-150s - received lopressor and Diltiazem in ED   doesn't know who his cardiologist denied PC or orthopnea or VELA or CP or edema   he was by the front sink when he passed out  he felt dizzy and lightheaded and turned around and fell face forward onto the ground with +LOC and +HS.   associated pain in upper mouth with loose tooth.   Denies vertigo  CP , Palpitations, headache, neck pain, vision changes, weakness, numbness, tingling.   Pt presented with Afib with RVR with stable BP with associated nausea and vomiting. CTH obtained upon arrival.  currently denies headache, nausea, vomiting, vision changes.   Endorses facial pain and left hand pain.  Repeat CT scan obtained which is stable without evidence of acute hemorrhage    89 year old male with known Afib presented to [facility] after syncope and collapse and fall and bruises face swollen lip bruised forehead and face nose - found to have a SDH and in HR of 120-150s - received lopressor and Diltiazem in ED .  doesn't know who his cardiologist denied CP or orthopnea or VELA or edema   he was by the front sink when he passed outas he turned-  he felt dizzy and lightheaded and turned around and fell face forward onto the ground with +LOC associated pain in upper mouth with loose tooth.   Denies vertigo , CP , Palpitations, headache, neck pain, vision changes, weakness, numbness, tingling.   Pt presented with Afib with RVR with stable BP with associated nausea and vomiting. CTH obtained upon arrival.  currently denies headache, nausea, vomiting, vision changes.   Endorses facial pain and left hand pain.  Repeat CT scan obtained which is stable without evidence of acute hemorrhage   he doesn't know his home meds- is pleasant but not a great historian- states he lives alone    89 year old male with known Afib  and systolic cardiomyopathypresented [facility] after syncope and collapse and fall and bruises face swollen lip bruised forehead and face nose - found to have a SDH and in HR of 120-150s - received lopressor and Diltiazem in ED .  doesn't know who his cardiologist denied CP or orthopnea or VELA or edema   he was by the front sink when he passed outas he turned-  he felt dizzy and lightheaded and turned around and fell face forward onto the ground with +LOC associated pain in upper mouth with loose tooth.   Denies vertigo , CP , Palpitations, headache, neck pain, vision changes, weakness, numbness, tingling.   Pt presented with Afib with RVR with stable BP with associated nausea and vomiting. CTH obtained upon arrival.  currently denies headache, nausea, vomiting, vision changes.   Endorses facial pain and left hand pain.  Repeat CT scan obtained which is stable without evidence of acute hemorrhage   he doesn't know his home meds- is pleasant but not a great historian- states he lives alone     Collateral hx from daughter Maritza-he has a"weak heart pumps "15%" his medications are Eliquis, Entresto and spironolactone and h takes the meds himself but she sets them up for him  he has been in his usual self -   discussed with neuro- OK to start  home Eliquis - Hygroma

## 2024-07-17 LAB
ALBUMIN SERPL ELPH-MCNC: 2.9 G/DL — LOW (ref 3.3–5.2)
ALP SERPL-CCNC: 57 U/L — SIGNIFICANT CHANGE UP (ref 40–120)
ALT FLD-CCNC: 8 U/L — SIGNIFICANT CHANGE UP
ANION GAP SERPL CALC-SCNC: 13 MMOL/L — SIGNIFICANT CHANGE UP (ref 5–17)
AST SERPL-CCNC: 16 U/L — SIGNIFICANT CHANGE UP
BILIRUB SERPL-MCNC: 0.8 MG/DL — SIGNIFICANT CHANGE UP (ref 0.4–2)
BUN SERPL-MCNC: 15.5 MG/DL — SIGNIFICANT CHANGE UP (ref 8–20)
CALCIUM SERPL-MCNC: 8.6 MG/DL — SIGNIFICANT CHANGE UP (ref 8.4–10.5)
CHLORIDE SERPL-SCNC: 103 MMOL/L — SIGNIFICANT CHANGE UP (ref 96–108)
CHOLEST SERPL-MCNC: 124 MG/DL — SIGNIFICANT CHANGE UP
CO2 SERPL-SCNC: 23 MMOL/L — SIGNIFICANT CHANGE UP (ref 22–29)
CREAT SERPL-MCNC: 0.82 MG/DL — SIGNIFICANT CHANGE UP (ref 0.5–1.3)
EGFR: 85 ML/MIN/1.73M2 — SIGNIFICANT CHANGE UP
GLUCOSE BLDC GLUCOMTR-MCNC: 128 MG/DL — HIGH (ref 70–99)
GLUCOSE BLDC GLUCOMTR-MCNC: 96 MG/DL — SIGNIFICANT CHANGE UP (ref 70–99)
GLUCOSE SERPL-MCNC: 111 MG/DL — HIGH (ref 70–99)
HCT VFR BLD CALC: 44.5 % — SIGNIFICANT CHANGE UP (ref 39–50)
HDLC SERPL-MCNC: 41 MG/DL — SIGNIFICANT CHANGE UP
HGB BLD-MCNC: 14.6 G/DL — SIGNIFICANT CHANGE UP (ref 13–17)
LIPID PNL WITH DIRECT LDL SERPL: 68 MG/DL — SIGNIFICANT CHANGE UP
MAGNESIUM SERPL-MCNC: 2.2 MG/DL — SIGNIFICANT CHANGE UP (ref 1.8–2.6)
MCHC RBC-ENTMCNC: 29.8 PG — SIGNIFICANT CHANGE UP (ref 27–34)
MCHC RBC-ENTMCNC: 32.8 GM/DL — SIGNIFICANT CHANGE UP (ref 32–36)
MCV RBC AUTO: 90.8 FL — SIGNIFICANT CHANGE UP (ref 80–100)
MRSA PCR RESULT.: SIGNIFICANT CHANGE UP
NON HDL CHOLESTEROL: 83 MG/DL — SIGNIFICANT CHANGE UP
PHOSPHATE SERPL-MCNC: 3.3 MG/DL — SIGNIFICANT CHANGE UP (ref 2.4–4.7)
PLATELET # BLD AUTO: 170 K/UL — SIGNIFICANT CHANGE UP (ref 150–400)
POTASSIUM SERPL-MCNC: 4 MMOL/L — SIGNIFICANT CHANGE UP (ref 3.5–5.3)
POTASSIUM SERPL-SCNC: 4 MMOL/L — SIGNIFICANT CHANGE UP (ref 3.5–5.3)
PROT SERPL-MCNC: 6.3 G/DL — LOW (ref 6.6–8.7)
RBC # BLD: 4.9 M/UL — SIGNIFICANT CHANGE UP (ref 4.2–5.8)
RBC # FLD: 15.3 % — HIGH (ref 10.3–14.5)
S AUREUS DNA NOSE QL NAA+PROBE: SIGNIFICANT CHANGE UP
SODIUM SERPL-SCNC: 139 MMOL/L — SIGNIFICANT CHANGE UP (ref 135–145)
TRIGL SERPL-MCNC: 77 MG/DL — SIGNIFICANT CHANGE UP
TSH SERPL-MCNC: 2.27 UIU/ML — SIGNIFICANT CHANGE UP (ref 0.27–4.2)
WBC # BLD: 9.53 K/UL — SIGNIFICANT CHANGE UP (ref 3.8–10.5)
WBC # FLD AUTO: 9.53 K/UL — SIGNIFICANT CHANGE UP (ref 3.8–10.5)

## 2024-07-17 PROCEDURE — 71045 X-RAY EXAM CHEST 1 VIEW: CPT | Mod: 26

## 2024-07-17 PROCEDURE — 99232 SBSQ HOSP IP/OBS MODERATE 35: CPT

## 2024-07-17 RX ADMIN — SACUBITRIL AND VALSARTAN 1 TABLET(S): 49; 51 TABLET, FILM COATED ORAL at 08:00

## 2024-07-17 RX ADMIN — Medication 25 MILLIGRAM(S): at 13:29

## 2024-07-17 RX ADMIN — PANTOPRAZOLE SODIUM 40 MILLIGRAM(S): 20 TABLET, DELAYED RELEASE ORAL at 08:00

## 2024-07-17 RX ADMIN — SACUBITRIL AND VALSARTAN 1 TABLET(S): 49; 51 TABLET, FILM COATED ORAL at 18:16

## 2024-07-17 RX ADMIN — Medication 25 MILLIGRAM(S): at 08:00

## 2024-07-17 RX ADMIN — Medication 25 MILLIGRAM(S): at 18:15

## 2024-07-17 RX ADMIN — APIXABAN 2.5 MILLIGRAM(S): 5 TABLET, FILM COATED ORAL at 08:01

## 2024-07-17 RX ADMIN — SPIRONOLACTONE 25 MILLIGRAM(S): 50 TABLET, FILM COATED ORAL at 08:01

## 2024-07-17 RX ADMIN — Medication 1000 MILLIGRAM(S): at 18:10

## 2024-07-17 RX ADMIN — APIXABAN 2.5 MILLIGRAM(S): 5 TABLET, FILM COATED ORAL at 18:15

## 2024-07-17 NOTE — PROGRESS NOTE ADULT - SUBJECTIVE AND OBJECTIVE BOX
[facility], THE [facility]                              [address] [address]                                                 PHONE: [phone]                                                 FAX: [phone]  ------------------------------------------------------------------------------------------------    Chief complaint: syncope    89y Male with past medical history as under transfer from [facility] for b/l subdural hygromas/hematoma. Pt was in front of the sink when he felt dizzy and lightheaded and turned around and fell face forward onto the ground with +LOC and +HS.   Pt reports multiple falls over the past month. Pt presented with afib with rvr with stable BP with associated nausea and vomiting. He denies any prior cardiac history.   At the time of evaluation, pt reports no palpitations. He was noted to be in AF/AFL with RVR      RECENT EVENTS    S/P Syncopal event  Converted back to NSR overnight  No CP or SOB    RELEVANT PHYSICAL EXAM:  Neck: No obvious JVD  Cardiovascular: irregular S1, S2  Respiratory: Lungs clear to auscultation; no crepitations, no wheeze  Musculoskeletal: No edema    Vital Signs Last 24 Hrs  T(C): 36.4 (16 Jul 2024 08:45), Max: 36.7 (16 Jul 2024 07:09)  T(F): 97.6 (16 Jul 2024 08:45), Max: 98 (16 Jul 2024 07:09)  HR: 80 (16 Jul 2024 08:45) (75 - 132)  BP: 119/76 (16 Jul 2024 08:45) (110/70 - 141/75)  BP(mean): 78 (16 Jul 2024 02:00) (78 - 78)  RR: 15 (16 Jul 2024 08:45) (14 - 24)  SpO2: 97% (16 Jul 2024 08:45) (95% - 98%)    Parameters below as of 16 Jul 2024 08:45  Patient On (Oxygen Delivery Method): room air        PAST MEDICAL & SURGICAL HISTORY:  Afib          penicillins (Unknown)      LABS:                        14.4   11.07 )-----------( 279      ( 15 Jul 2024 23:26 )             43.1     07-15    138  |  99  |  17.0  ----------------------------<  126<H>  3.8   |  23.0  |  1.05    Ca    9.0      15 Jul 2024 23:26    TPro  7.4  /  Alb  3.6  /  TBili  0.7  /  DBili  x   /  AST  19  /  ALT  11  /  AlkPhos  64  07-15    RADIOLOGY & ADDITIONAL STUDIES: (reviewed)  CT was independently visualized/reviewed and demonstrated: No significant change.    Redemonstrated hypodense bilateral subdural collections measuring up to 6   mm.    CARDIOLOGY TESTING:(reviewed)     ECG (Independent visualization): AF    ECHOCARDIOGRAM :  1. Left ventricular cavity is normal in size. Left ventricular systolic function is severely decreased with an ejection fraction visually estimated at 15 %. Global left ventricular hypokinesis.  2. There is moderate (grade 2) left ventricular diastolic dysfunction. Analysis of left ventricular diastolic function and filling pressure is made challenging by the presence of atrial fibrillation.  3. Normal right ventricular cavity size and reduced systolic function.  4. Mild pulmonic regurgitation.  5. Moderate mitral regurgitation.  6. Mild aortic stenosis. low flow, low gradient aortic stenosis with reduced EF.  7. Mild to moderate aortic regurgitation.  8. Moderate tricuspid regurgitation.  9. The inferior vena cava is dilated measuring 2.20 cm in diameter, (dilated >2.1cm) with normal inspiratory collapse (normal >50%) consistent with mildly elevated right atrial pressure (\R\8, range 5-10mmHg).  10. Estimated pulmonary artery systolic pressure is 22 mmHg, consistent with normal pulmonary artery pressure.  11. Small circumferential pericardial effusion.  12. Small right pleural effusion noted.    TELEMETRY independently visualized/reviewed and demonstrated : AF/AFL    MEDICATIONS:(reviewed)  Home Medications:  MEDICATIONS  (STANDING):  MEDICATIONS  (STANDING):  acetaminophen     Tablet .. 650 milliGRAM(s) Oral once  digoxin  Injectable 500 MICROGram(s) IV Push once  metoprolol tartrate 25 milliGRAM(s) Oral every 6 hours    MEDICATIONS  (PRN):      ASSESSMENT AND PLAN:    89y Male with AF with RVR presented to [facility] with syncope - found to have a SDH and in HR of 120-150s - received lopressor and Diltiazem in ED. Now noted to be in AF/AFL with RVR  Echo with CMP and severe LV dysfunction in the setting of AF    Cont present cardiac therapy patient back in NSR, No HF symptoms at this time  Will add medications for CMP pending hospital course  Defer to neurosurgery if/when pt can restart anticoagulation, Watchman eval outpatient  Telemetry monitoring

## 2024-07-17 NOTE — PROGRESS NOTE ADULT - SUBJECTIVE AND OBJECTIVE BOX
Chief complaint: Afib with RVR    Patient seen and examined at bedside. No acute overnight events reported. Patient states he is feeling better, denies fever, chills, chest pain or shortness of breath.     Vital Signs Last 24 Hrs  T(F): 97.8 (17 Jul 2024 11:10), Max: 98.4 (16 Jul 2024 20:00)  HR: 89 (17 Jul 2024 11:10) (89 - 121)  BP: 106/52 (17 Jul 2024 11:10) (97/65 - 138/96)  RR: 18 (16 Jul 2024 23:32) (14 - 18)  SpO2: 98% (17 Jul 2024 11:10) (94% - 98%)    Physical Exam:  Constitutional: alert and oriented, abrasion over bridge of nose, in no acute distress   Neck: Soft and supple  Respiratory: Clear to auscultation bilaterally  Cardiovascular: Irregular rhythm  Gastrointestinal: Soft, non-tender to palpation, +bs  Vascular: 2+ peripheral pulses  Neurological: A/O x 3  Musculoskeletal: no lower extremity edema bilaterally    Labs:                        14.6   9.53  )-----------( 170      ( 17 Jul 2024 10:20 )             44.5   07-15    138  |  99  |  17.0  ----------------------------<  126<H>  3.8   |  23.0  |  1.05    Ca    9.0      15 Jul 2024 23:26    TPro  7.4  /  Alb  3.6  /  TBili  0.7  /  DBili  x   /  AST  19  /  ALT  11  /  AlkPhos  64  07-15

## 2024-07-17 NOTE — PROGRESS NOTE ADULT - ASSESSMENT
89 y/o M with PMH of Afib presented to [facility] with syncope found to have a SDH and -150s.     Bilateral subdural hygromas/hematoma  - Neurosurgery recommendations appreciated  - Per NSG, no absolute contraindication for Eliquis given on acute hemorrhage  - No antiseizure medications recommended    Atrial Fibrillation with RVR  - Telemetry monitoring  - Received Lopressor and Diltiazem in ED  - Cardiology recs appreciated  - Continue Eliquis 2.5mg BID  - Outpatient Watchman evaluation  - Continue Lopressor 25mg q6h  - Continue Entresto  - Continue Aldactone 25mg daily    Leukocytosis  - Follow blood culture in process  - Follow urine culture in process  - Continue Ceftriaxone    DVT ppx  - Eliquis    Dispo: PT consult

## 2024-07-18 ENCOUNTER — RESULT REVIEW (OUTPATIENT)
Age: 89
End: 2024-07-18

## 2024-07-18 LAB
ALBUMIN SERPL ELPH-MCNC: 2.8 G/DL — LOW (ref 3.3–5.2)
ALP SERPL-CCNC: 58 U/L — SIGNIFICANT CHANGE UP (ref 40–120)
ALT FLD-CCNC: 7 U/L — SIGNIFICANT CHANGE UP
AMMONIA BLD-MCNC: 12 UMOL/L — SIGNIFICANT CHANGE UP (ref 11–55)
ANION GAP SERPL CALC-SCNC: 11 MMOL/L — SIGNIFICANT CHANGE UP (ref 5–17)
ANION GAP SERPL CALC-SCNC: 14 MMOL/L — SIGNIFICANT CHANGE UP (ref 5–17)
ANION GAP SERPL CALC-SCNC: 9 MMOL/L — SIGNIFICANT CHANGE UP (ref 5–17)
APTT BLD: 33 SEC — SIGNIFICANT CHANGE UP (ref 24.5–35.6)
APTT BLD: 77.3 SEC — HIGH (ref 24.5–35.6)
AST SERPL-CCNC: 14 U/L — SIGNIFICANT CHANGE UP
BASE EXCESS BLDV CALC-SCNC: 0 MMOL/L — SIGNIFICANT CHANGE UP (ref -2–3)
BASOPHILS # BLD AUTO: 0.05 K/UL — SIGNIFICANT CHANGE UP (ref 0–0.2)
BASOPHILS # BLD AUTO: 0.06 K/UL — SIGNIFICANT CHANGE UP (ref 0–0.2)
BASOPHILS NFR BLD AUTO: 0.6 % — SIGNIFICANT CHANGE UP (ref 0–2)
BASOPHILS NFR BLD AUTO: 0.7 % — SIGNIFICANT CHANGE UP (ref 0–2)
BILIRUB SERPL-MCNC: 0.6 MG/DL — SIGNIFICANT CHANGE UP (ref 0.4–2)
BUN SERPL-MCNC: 13 MG/DL — SIGNIFICANT CHANGE UP (ref 8–20)
BUN SERPL-MCNC: 14.4 MG/DL — SIGNIFICANT CHANGE UP (ref 8–20)
BUN SERPL-MCNC: 15 MG/DL — SIGNIFICANT CHANGE UP (ref 8–20)
C DIFF BY PCR RESULT: SIGNIFICANT CHANGE UP
CA-I SERPL-SCNC: 1.04 MMOL/L — LOW (ref 1.15–1.33)
CALCIUM SERPL-MCNC: 7.9 MG/DL — LOW (ref 8.4–10.5)
CALCIUM SERPL-MCNC: 8.3 MG/DL — LOW (ref 8.4–10.5)
CALCIUM SERPL-MCNC: 8.7 MG/DL — SIGNIFICANT CHANGE UP (ref 8.4–10.5)
CHLORIDE BLDV-SCNC: 93 MMOL/L — LOW (ref 96–108)
CHLORIDE SERPL-SCNC: 100 MMOL/L — SIGNIFICANT CHANGE UP (ref 96–108)
CHLORIDE SERPL-SCNC: 104 MMOL/L — SIGNIFICANT CHANGE UP (ref 96–108)
CHLORIDE SERPL-SCNC: 96 MMOL/L — SIGNIFICANT CHANGE UP (ref 96–108)
CK SERPL-CCNC: 37 U/L — SIGNIFICANT CHANGE UP (ref 30–200)
CO2 SERPL-SCNC: 23 MMOL/L — SIGNIFICANT CHANGE UP (ref 22–29)
CO2 SERPL-SCNC: 25 MMOL/L — SIGNIFICANT CHANGE UP (ref 22–29)
CO2 SERPL-SCNC: 25 MMOL/L — SIGNIFICANT CHANGE UP (ref 22–29)
CREAT SERPL-MCNC: 0.76 MG/DL — SIGNIFICANT CHANGE UP (ref 0.5–1.3)
CREAT SERPL-MCNC: 0.81 MG/DL — SIGNIFICANT CHANGE UP (ref 0.5–1.3)
CREAT SERPL-MCNC: 0.81 MG/DL — SIGNIFICANT CHANGE UP (ref 0.5–1.3)
EGFR: 85 ML/MIN/1.73M2 — SIGNIFICANT CHANGE UP
EGFR: 85 ML/MIN/1.73M2 — SIGNIFICANT CHANGE UP
EGFR: 87 ML/MIN/1.73M2 — SIGNIFICANT CHANGE UP
EOSINOPHIL # BLD AUTO: 0.47 K/UL — SIGNIFICANT CHANGE UP (ref 0–0.5)
EOSINOPHIL # BLD AUTO: 0.67 K/UL — HIGH (ref 0–0.5)
EOSINOPHIL NFR BLD AUTO: 5.2 % — SIGNIFICANT CHANGE UP (ref 0–6)
EOSINOPHIL NFR BLD AUTO: 7.4 % — HIGH (ref 0–6)
GAS PNL BLDV: 125 MMOL/L — LOW (ref 136–145)
GAS PNL BLDV: SIGNIFICANT CHANGE UP
GLUCOSE BLDC GLUCOMTR-MCNC: 121 MG/DL — HIGH (ref 70–99)
GLUCOSE BLDC GLUCOMTR-MCNC: 82 MG/DL — SIGNIFICANT CHANGE UP (ref 70–99)
GLUCOSE BLDV-MCNC: 444 MG/DL — HIGH (ref 70–99)
GLUCOSE SERPL-MCNC: 121 MG/DL — HIGH (ref 70–99)
GLUCOSE SERPL-MCNC: 299 MG/DL — HIGH (ref 70–99)
GLUCOSE SERPL-MCNC: 83 MG/DL — SIGNIFICANT CHANGE UP (ref 70–99)
HCO3 BLDV-SCNC: 25 MMOL/L — SIGNIFICANT CHANGE UP (ref 22–29)
HCT VFR BLD CALC: 37.8 % — LOW (ref 39–50)
HCT VFR BLD CALC: 38.4 % — LOW (ref 39–50)
HCT VFR BLD CALC: 42.5 % — SIGNIFICANT CHANGE UP (ref 39–50)
HCT VFR BLDA CALC: 39 % — SIGNIFICANT CHANGE UP
HGB BLD CALC-MCNC: 12.9 G/DL — SIGNIFICANT CHANGE UP (ref 12.6–17.4)
HGB BLD-MCNC: 12.9 G/DL — LOW (ref 13–17)
HGB BLD-MCNC: 13 G/DL — SIGNIFICANT CHANGE UP (ref 13–17)
HGB BLD-MCNC: 14.2 G/DL — SIGNIFICANT CHANGE UP (ref 13–17)
IMM GRANULOCYTES NFR BLD AUTO: 0.4 % — SIGNIFICANT CHANGE UP (ref 0–0.9)
IMM GRANULOCYTES NFR BLD AUTO: 0.5 % — SIGNIFICANT CHANGE UP (ref 0–0.9)
INR BLD: 1.36 RATIO — HIGH (ref 0.85–1.18)
LACTATE BLDV-MCNC: 1 MMOL/L — SIGNIFICANT CHANGE UP (ref 0.5–2)
LYMPHOCYTES # BLD AUTO: 1.06 K/UL — SIGNIFICANT CHANGE UP (ref 1–3.3)
LYMPHOCYTES # BLD AUTO: 1.15 K/UL — SIGNIFICANT CHANGE UP (ref 1–3.3)
LYMPHOCYTES # BLD AUTO: 11.7 % — LOW (ref 13–44)
LYMPHOCYTES # BLD AUTO: 12.6 % — LOW (ref 13–44)
MAGNESIUM SERPL-MCNC: 2.6 MG/DL — SIGNIFICANT CHANGE UP (ref 1.8–2.6)
MCHC RBC-ENTMCNC: 29.3 PG — SIGNIFICANT CHANGE UP (ref 27–34)
MCHC RBC-ENTMCNC: 29.5 PG — SIGNIFICANT CHANGE UP (ref 27–34)
MCHC RBC-ENTMCNC: 30.1 PG — SIGNIFICANT CHANGE UP (ref 27–34)
MCHC RBC-ENTMCNC: 33.4 GM/DL — SIGNIFICANT CHANGE UP (ref 32–36)
MCHC RBC-ENTMCNC: 33.9 GM/DL — SIGNIFICANT CHANGE UP (ref 32–36)
MCHC RBC-ENTMCNC: 34.1 GM/DL — SIGNIFICANT CHANGE UP (ref 32–36)
MCV RBC AUTO: 87.3 FL — SIGNIFICANT CHANGE UP (ref 80–100)
MCV RBC AUTO: 87.8 FL — SIGNIFICANT CHANGE UP (ref 80–100)
MCV RBC AUTO: 88.1 FL — SIGNIFICANT CHANGE UP (ref 80–100)
MONOCYTES # BLD AUTO: 0.76 K/UL — SIGNIFICANT CHANGE UP (ref 0–0.9)
MONOCYTES # BLD AUTO: 0.84 K/UL — SIGNIFICANT CHANGE UP (ref 0–0.9)
MONOCYTES NFR BLD AUTO: 8.4 % — SIGNIFICANT CHANGE UP (ref 2–14)
MONOCYTES NFR BLD AUTO: 9.2 % — SIGNIFICANT CHANGE UP (ref 2–14)
NEUTROPHILS # BLD AUTO: 6.34 K/UL — SIGNIFICANT CHANGE UP (ref 1.8–7.4)
NEUTROPHILS # BLD AUTO: 6.69 K/UL — SIGNIFICANT CHANGE UP (ref 1.8–7.4)
NEUTROPHILS NFR BLD AUTO: 69.6 % — SIGNIFICANT CHANGE UP (ref 43–77)
NEUTROPHILS NFR BLD AUTO: 73.7 % — SIGNIFICANT CHANGE UP (ref 43–77)
PCO2 BLDV: 40 MMHG — LOW (ref 42–55)
PH BLDV: 7.4 — SIGNIFICANT CHANGE UP (ref 7.32–7.43)
PHOSPHATE SERPL-MCNC: 3.1 MG/DL — SIGNIFICANT CHANGE UP (ref 2.4–4.7)
PLATELET # BLD AUTO: 202 K/UL — SIGNIFICANT CHANGE UP (ref 150–400)
PLATELET # BLD AUTO: 202 K/UL — SIGNIFICANT CHANGE UP (ref 150–400)
PLATELET # BLD AUTO: 232 K/UL — SIGNIFICANT CHANGE UP (ref 150–400)
PO2 BLDV: 66 MMHG — HIGH (ref 25–45)
POTASSIUM BLDV-SCNC: 3.7 MMOL/L — SIGNIFICANT CHANGE UP (ref 3.5–5.1)
POTASSIUM SERPL-MCNC: 3.5 MMOL/L — SIGNIFICANT CHANGE UP (ref 3.5–5.3)
POTASSIUM SERPL-MCNC: 3.7 MMOL/L — SIGNIFICANT CHANGE UP (ref 3.5–5.3)
POTASSIUM SERPL-MCNC: 5.7 MMOL/L — HIGH (ref 3.5–5.3)
POTASSIUM SERPL-SCNC: 3.5 MMOL/L — SIGNIFICANT CHANGE UP (ref 3.5–5.3)
POTASSIUM SERPL-SCNC: 3.7 MMOL/L — SIGNIFICANT CHANGE UP (ref 3.5–5.3)
POTASSIUM SERPL-SCNC: 5.7 MMOL/L — HIGH (ref 3.5–5.3)
PROT SERPL-MCNC: 5.7 G/DL — LOW (ref 6.6–8.7)
PROTHROM AB SERPL-ACNC: 15 SEC — HIGH (ref 9.5–13)
RBC # BLD: 4.29 M/UL — SIGNIFICANT CHANGE UP (ref 4.2–5.8)
RBC # BLD: 4.4 M/UL — SIGNIFICANT CHANGE UP (ref 4.2–5.8)
RBC # BLD: 4.84 M/UL — SIGNIFICANT CHANGE UP (ref 4.2–5.8)
RBC # FLD: 14.9 % — HIGH (ref 10.3–14.5)
RBC # FLD: 15 % — HIGH (ref 10.3–14.5)
RBC # FLD: 15.1 % — HIGH (ref 10.3–14.5)
SAO2 % BLDV: 94.5 % — SIGNIFICANT CHANGE UP
SODIUM SERPL-SCNC: 130 MMOL/L — LOW (ref 135–145)
SODIUM SERPL-SCNC: 137 MMOL/L — SIGNIFICANT CHANGE UP (ref 135–145)
SODIUM SERPL-SCNC: 140 MMOL/L — SIGNIFICANT CHANGE UP (ref 135–145)
TROPONIN T, HIGH SENSITIVITY RESULT: 35 NG/L — SIGNIFICANT CHANGE UP (ref 0–51)
TROPONIN T, HIGH SENSITIVITY RESULT: 35 NG/L — SIGNIFICANT CHANGE UP (ref 0–51)
WBC # BLD: 12.74 K/UL — HIGH (ref 3.8–10.5)
WBC # BLD: 9.07 K/UL — SIGNIFICANT CHANGE UP (ref 3.8–10.5)
WBC # BLD: 9.11 K/UL — SIGNIFICANT CHANGE UP (ref 3.8–10.5)
WBC # FLD AUTO: 12.74 K/UL — HIGH (ref 3.8–10.5)
WBC # FLD AUTO: 9.07 K/UL — SIGNIFICANT CHANGE UP (ref 3.8–10.5)
WBC # FLD AUTO: 9.11 K/UL — SIGNIFICANT CHANGE UP (ref 3.8–10.5)

## 2024-07-18 PROCEDURE — 93010 ELECTROCARDIOGRAM REPORT: CPT

## 2024-07-18 PROCEDURE — 93306 TTE W/DOPPLER COMPLETE: CPT | Mod: 26

## 2024-07-18 PROCEDURE — 99291 CRITICAL CARE FIRST HOUR: CPT | Mod: GC

## 2024-07-18 PROCEDURE — 99233 SBSQ HOSP IP/OBS HIGH 50: CPT | Mod: 25

## 2024-07-18 RX ORDER — AMIODARONE HYDROCHLORIDE 50 MG/ML
0.5 INJECTION, SOLUTION INTRAVENOUS
Qty: 450 | Refills: 0 | Status: DISCONTINUED | OUTPATIENT
Start: 2024-07-18 | End: 2024-07-18

## 2024-07-18 RX ORDER — POTASSIUM CHLORIDE 1500 MG/1
40 TABLET, EXTENDED RELEASE ORAL ONCE
Refills: 0 | Status: COMPLETED | OUTPATIENT
Start: 2024-07-18 | End: 2024-07-18

## 2024-07-18 RX ORDER — AMIODARONE HYDROCHLORIDE 50 MG/ML
1 INJECTION, SOLUTION INTRAVENOUS
Qty: 450 | Refills: 0 | Status: DISCONTINUED | OUTPATIENT
Start: 2024-07-18 | End: 2024-07-18

## 2024-07-18 RX ORDER — METOPROLOL TARTRATE 100 MG
25 TABLET ORAL EVERY 6 HOURS
Refills: 0 | Status: DISCONTINUED | OUTPATIENT
Start: 2024-07-18 | End: 2024-07-19

## 2024-07-18 RX ORDER — HEPARIN SODIUM 1000 [USP'U]/ML
2500 INJECTION, SOLUTION INTRAVENOUS; SUBCUTANEOUS EVERY 6 HOURS
Refills: 0 | Status: DISCONTINUED | OUTPATIENT
Start: 2024-07-18 | End: 2024-07-20

## 2024-07-18 RX ORDER — AMIODARONE HYDROCHLORIDE 50 MG/ML
2 INJECTION, SOLUTION INTRAVENOUS
Qty: 450 | Refills: 0 | Status: DISCONTINUED | OUTPATIENT
Start: 2024-07-18 | End: 2024-07-18

## 2024-07-18 RX ORDER — HEPARIN SODIUM 1000 [USP'U]/ML
5000 INJECTION, SOLUTION INTRAVENOUS; SUBCUTANEOUS EVERY 6 HOURS
Refills: 0 | Status: DISCONTINUED | OUTPATIENT
Start: 2024-07-18 | End: 2024-07-20

## 2024-07-18 RX ORDER — MAGNESIUM SULFATE 500 MG/ML
2 VIAL (ML) INJECTION ONCE
Refills: 0 | Status: COMPLETED | OUTPATIENT
Start: 2024-07-18 | End: 2024-07-18

## 2024-07-18 RX ORDER — MAGNESIUM SULFATE 500 MG/ML
2 VIAL (ML) INJECTION ONCE
Refills: 0 | Status: DISCONTINUED | OUTPATIENT
Start: 2024-07-18 | End: 2024-07-18

## 2024-07-18 RX ORDER — AMIODARONE HYDROCHLORIDE 50 MG/ML
150 INJECTION, SOLUTION INTRAVENOUS ONCE
Refills: 0 | Status: DISCONTINUED | OUTPATIENT
Start: 2024-07-18 | End: 2024-07-18

## 2024-07-18 RX ORDER — HEPARIN SODIUM 1000 [USP'U]/ML
INJECTION, SOLUTION INTRAVENOUS; SUBCUTANEOUS
Qty: 25000 | Refills: 0 | Status: DISCONTINUED | OUTPATIENT
Start: 2024-07-18 | End: 2024-07-20

## 2024-07-18 RX ORDER — POTASSIUM CHLORIDE 1500 MG/1
10 TABLET, EXTENDED RELEASE ORAL
Refills: 0 | Status: COMPLETED | OUTPATIENT
Start: 2024-07-18 | End: 2024-07-18

## 2024-07-18 RX ORDER — DEXTROSE 4 G
50 TABLET,CHEWABLE ORAL ONCE
Refills: 0 | Status: COMPLETED | OUTPATIENT
Start: 2024-07-18 | End: 2024-07-18

## 2024-07-18 RX ORDER — AMIODARONE HYDROCHLORIDE 50 MG/ML
0.5 INJECTION, SOLUTION INTRAVENOUS
Qty: 450 | Refills: 0 | Status: DISCONTINUED | OUTPATIENT
Start: 2024-07-19 | End: 2024-07-19

## 2024-07-18 RX ORDER — CHLORHEXIDINE GLUCONATE 500 MG/1
1 CLOTH TOPICAL DAILY
Refills: 0 | Status: DISCONTINUED | OUTPATIENT
Start: 2024-07-18 | End: 2024-07-30

## 2024-07-18 RX ADMIN — Medication 100 GRAM(S): at 15:49

## 2024-07-18 RX ADMIN — Medication 150 GRAM(S): at 13:48

## 2024-07-18 RX ADMIN — POTASSIUM CHLORIDE 100 MILLIEQUIVALENT(S): 1500 TABLET, EXTENDED RELEASE ORAL at 22:55

## 2024-07-18 RX ADMIN — Medication 25 MILLIGRAM(S): at 17:28

## 2024-07-18 RX ADMIN — APIXABAN 2.5 MILLIGRAM(S): 5 TABLET, FILM COATED ORAL at 05:24

## 2024-07-18 RX ADMIN — POTASSIUM CHLORIDE 100 MILLIEQUIVALENT(S): 1500 TABLET, EXTENDED RELEASE ORAL at 21:10

## 2024-07-18 RX ADMIN — HEPARIN SODIUM 1100 UNIT(S)/HR: 1000 INJECTION, SOLUTION INTRAVENOUS; SUBCUTANEOUS at 17:13

## 2024-07-18 RX ADMIN — Medication 25 MILLIGRAM(S): at 00:33

## 2024-07-18 RX ADMIN — CHLORHEXIDINE GLUCONATE 1 APPLICATION(S): 500 CLOTH TOPICAL at 17:31

## 2024-07-18 RX ADMIN — Medication 1000 MILLIGRAM(S): at 17:15

## 2024-07-18 RX ADMIN — POTASSIUM CHLORIDE 40 MILLIEQUIVALENT(S): 1500 TABLET, EXTENDED RELEASE ORAL at 21:10

## 2024-07-18 RX ADMIN — PANTOPRAZOLE SODIUM 40 MILLIGRAM(S): 20 TABLET, DELAYED RELEASE ORAL at 05:24

## 2024-07-18 RX ADMIN — Medication 25 MILLIGRAM(S): at 12:08

## 2024-07-18 RX ADMIN — Medication 7.5 MG/MIN: at 20:44

## 2024-07-18 RX ADMIN — SPIRONOLACTONE 25 MILLIGRAM(S): 50 TABLET, FILM COATED ORAL at 05:24

## 2024-07-18 RX ADMIN — AMIODARONE HYDROCHLORIDE 33.3 MG/MIN: 50 INJECTION, SOLUTION INTRAVENOUS at 09:42

## 2024-07-18 RX ADMIN — POTASSIUM CHLORIDE 100 MILLIEQUIVALENT(S): 1500 TABLET, EXTENDED RELEASE ORAL at 20:44

## 2024-07-18 RX ADMIN — Medication 25 MILLIGRAM(S): at 05:24

## 2024-07-18 RX ADMIN — SACUBITRIL AND VALSARTAN 1 TABLET(S): 49; 51 TABLET, FILM COATED ORAL at 17:15

## 2024-07-18 RX ADMIN — SACUBITRIL AND VALSARTAN 1 TABLET(S): 49; 51 TABLET, FILM COATED ORAL at 05:24

## 2024-07-18 RX ADMIN — POTASSIUM CHLORIDE 40 MILLIEQUIVALENT(S): 1500 TABLET, EXTENDED RELEASE ORAL at 17:31

## 2024-07-18 RX ADMIN — HEPARIN SODIUM 1100 UNIT(S)/HR: 1000 INJECTION, SOLUTION INTRAVENOUS; SUBCUTANEOUS at 23:38

## 2024-07-18 NOTE — RAPID RESPONSE TEAM SUMMARY - NSMEDICATIONSRRT_GEN_ALL_CORE
Lidocaine 1 amp  Lidocaine 1gm/1 drip  Amio 150 mg bolus  c/w Amio drip  Heparin drip to be started at 5PM Lidocaine 1 amp  Lidocaine 1mg/min GTT  Amio 150 mg bolus  c/w Amio drip  Heparin drip to be started at 5PM

## 2024-07-18 NOTE — RAPID RESPONSE TEAM SUMMARY - NSSITUATIONBACKGROUNDRRT_GEN_ALL_CORE
Pt. in bed, Tele tech advised the nurse of 6 seconds arrythmia/Torsades.  Pt. in bed, comfortable, without any new c/o at present. States has diarrhea, no abd.p/chills/fever/N/V.  Pt. on RA, non-labored.  13: 16 /48, HR53, AFIB, O2 sat 97%, on RA  13: 22 /71, HR56, AFIB, O2 sat 95%,

## 2024-07-18 NOTE — CONSULT NOTE ADULT - ATTENDING COMMENTS
89-year-old male with history of A-fib heart failure with reduced EF with multiple episodes of fall this month had another episode of fall with syncope initially presented to [facility] with concerns of head bleed transferred to [facility] on July 15 admitted to medicine after being seen by neurosurgery and trauma surgery where he was found to have  bilateral 6 mm subdural hygroma/hematoma which remained stable on subsequent to CT head and was cleared  from trauma surgery as well as neurosurgery admitted to medicine for evaluation management of syncope as well as A-fib with RVR treated with Cardizem and Lopressor now Hospital course complicated today with episodes of 19-second torsade with syncope and spontaneous resolution but subsequent multiple episode of nonsustained ventricular tachycardia ranging from 6 to 15 seconds with periods of small pauses initially receiving 2 g of IV magnesium over 10 minutes started on amiodarone infusion and despite of that continued having torsades with brief periods of lightheadedness even while lying in bed and hot sensation in his chest but subsequent no lasting symptoms.  Denied any chest pain difficulty breathing fever or chills.  Patient started on antibiotics and has had few episodes of diarrhea through hospital goals ruled out C. difficile.  Currently being admitted to medical ICU for recurrent ventricular tachycardia, heart failure with reduced EF, syncope and collapse complicated by bilateral subdural hygroma/hematoma.      Gave 2 more gram of IV magnesium, 100 mg of IV lidocaine push followed by 1 mg/min infusion and 150 mg of IV amiodarone push as well with IV amiodarone infusion per protocol over 24 hours  Starting on heparin starting today after clearing from neurosurgery, will consider repeating CT head tonight  Continuing with Lopressor 25 mg every 6 hours, Entresto, Aldactone   n.p.o.  except medication   possible ischemic evaluation tomorrow morning for left heart cath   cardiology following from [place], consulted her EP, pending evaluation  Will try to maintain potassium close to 4.5 (needs repletion), magnesium close to 2.5   fall aspiration seizure precaution   constant observation   pending urine culture, short course of Rocephin, blood culture negative  Full code for now  Plan discussed with ICU team as well as family  Oxygenating ventilating fine on room air

## 2024-07-18 NOTE — PROGRESS NOTE ADULT - SUBJECTIVE AND OBJECTIVE BOX
[place] [facility], THE [facility]                                   [address] [place] [address]                                                      PHONE: [phone]                                                         FAX: [phone]  [email or web address]  ---------------------------------------------------------------------------------------------------------------------------------    Overnight events/patient complaints: Pt currently feels well.  Episodes torsade de pointes/polymorphic VT noted.  Currently on amiodarone and lidocaine IV gtt, oral metoprolol,  rhythm stable on tele, in sinus rhythm,  rare ventricular couplets since arrival in MICU.  No CP or SOB.  Denies previous history of syncope prior to this admission, but seems to have poor memory.       penicillins (Unknown)    MEDICATIONS  (STANDING):  cefTRIAXone Injectable. 1000 milliGRAM(s) IV Push every 24 hours  heparin  Infusion.  Unit(s)/Hr (11 mL/Hr) IV Continuous <Continuous>  lidocaine   Infusion 1 mG/Min (7.5 mL/Hr) IV Continuous <Continuous>  metoprolol tartrate 25 milliGRAM(s) Oral every 6 hours  pantoprazole    Tablet 40 milliGRAM(s) Oral before breakfast  potassium chloride   Powder 40 milliEquivalent(s) Oral once  sacubitril 49 mG/valsartan 51 mG 1 Tablet(s) Oral two times a day  spironolactone 25 milliGRAM(s) Oral daily    MEDICATIONS  (PRN):  heparin   Injectable 5000 Unit(s) IV Push every 6 hours PRN For aPTT less than 40  heparin   Injectable 2500 Unit(s) IV Push every 6 hours PRN For aPTT between 40 - 57      Vital Signs Last 24 Hrs  T(C): 36.6 (18 Jul 2024 16:00), Max: 37.2 (18 Jul 2024 08:13)  T(F): 97.8 (18 Jul 2024 16:00), Max: 98.9 (18 Jul 2024 08:13)  HR: 65 (18 Jul 2024 16:45) (52 - 81)  BP: 140/62 (18 Jul 2024 16:45) (119/59 - 152/63)  BP(mean): 86 (18 Jul 2024 16:45) (82 - 90)  RR: 22 (18 Jul 2024 16:45) (14 - 22)  SpO2: 98% (18 Jul 2024 16:45) (93% - 100%)    Parameters below as of 18 Jul 2024 16:00  Patient On (Oxygen Delivery Method): room air      ICU Vital Signs Last 24 Hrs  FER JOHNSON  I&O's Detail    17 Jul 2024 07:01  -  18 Jul 2024 07:00  --------------------------------------------------------  IN:    Oral Fluid: 236 mL  Total IN: 236 mL    OUT:    Indwelling Catheter - Urethral (mL): 150 mL    Voided (mL): 200 mL  Total OUT: 350 mL    Total NET: -114 mL      18 Jul 2024 07:01  -  18 Jul 2024 17:02  --------------------------------------------------------  IN:    Amiodarone: 33.3 mL    IV PiggyBack: 50 mL    Lidocaine: 7.5 mL  Total IN: 90.8 mL    OUT:    Indwelling Catheter - Urethral (mL): 75 mL  Total OUT: 75 mL    Total NET: 15.8 mL        I&O's Summary    17 Jul 2024 07:01  -  18 Jul 2024 07:00  --------------------------------------------------------  IN: 236 mL / OUT: 350 mL / NET: -114 mL    18 Jul 2024 07:01  -  18 Jul 2024 17:02  --------------------------------------------------------  IN: 90.8 mL / OUT: 75 mL / NET: 15.8 mL      Drug Dosing Weight  FER JOHNSON      PHYSICAL EXAM:  General: alert and cooperative.  HEENT: Head; normocephalic, atraumatic.  Eyes: Pupils reactive, cornea wnl.  Neck: Supple, no nodes adenopathy, no NVD or carotid bruit or thyromegaly.  CARDIOVASCULAR: Normal S1 and S2, No murmur, rub, gallop or lift.   LUNGS: No rales, rhonchi or wheeze. Normal breath sounds bilaterally.  ABDOMEN: Soft, nontender without mass or organomegaly. bowel sounds normoactive.  EXTREMITIES: No clubbing, cyanosis or edema. Distal pulses wnl.   SKIN: warm and dry with normal turgor.  NEURO: Alert/oriented x 3/normal motor exam. No pathologic reflexes.    PSYCH: normal affect.        LABS:                        12.9   9.07  )-----------( 202      ( 18 Jul 2024 15:20 )             37.8     07-18    130<L>  |  96  |  14.4  ----------------------------<  299<H>  3.5   |  25.0  |  0.76    Ca    7.9<L>      18 Jul 2024 15:20  Phos  3.1     07-18  Mg     2.6     07-18    TPro  5.7<L>  /  Alb  2.8<L>  /  TBili  0.6  /  DBili  x   /  AST  14  /  ALT  7   /  AlkPhos  58  07-18    FER JOHNSON  CARDIAC MARKERS ( 18 Jul 2024 15:20 )  x     / x     / 37 U/L / x     / x          PT/INR - ( 18 Jul 2024 15:20 )   PT: 15.0 sec;   INR: 1.36 ratio         PTT - ( 18 Jul 2024 15:20 )  PTT:33.0 sec  Urinalysis Basic - ( 18 Jul 2024 15:20 )    Color: x / Appearance: x / SG: x / pH: x  Gluc: 299 mg/dL / Ketone: x  / Bili: x / Urobili: x   Blood: x / Protein: x / Nitrite: x   Leuk Esterase: x / RBC: x / WBC x   Sq Epi: x / Non Sq Epi: x / Bacteria: x        RADIOLOGY & ADDITIONAL STUDIES:    INTERPRETATION OF TELEMETRY (personally reviewed): as above    ECG: on presentation rapidly conducted AF. evidence of prior anteroseptal MI    ECHO: echo from [facility] noted, EF 15%      ASSESSMENT AND PLAN:  In summary, FER JOHNSON is an 89y Male with past medical history significant for AF, apparently recently diagnosed systolic CHF with severe LV systolic dysfunction, admitted with syncope, noted to have syncopal polymorphic VT here, with breakthrough on amiodarone, now on amiodarone and lidocaine IV gtt, in sinus rhythm, no VT since lido added.  No evidence acute infarct or active angina.  Normotensive.  Seems euvolemic.    Mg normal, K borderline low, being repleted. Keep K+>4, Mg>2.    ECG suggestive of prior anterior infarct.  Refused cath during recent admission to [facility], but agreeable now. Plan for cath tomorrow given Eliquis this AM, which has been held.  Coronary revascularization as indicated. Bridge with IV heparin IV gtt starting 5 PM tonight, can discontinue prior to cath.  NPO p MN.  Continue B-blocker, amio, and lido.  Will ultimately need to be transitioned to oral antiarrhythmics.  If/when rhythm stable on oral antiarrhythmics, would be an acceptable candidate for ICD for secondary prevention, he seems agreeable to this if indicated. ICU monitoring.  We will follow with you

## 2024-07-18 NOTE — CONSULT NOTE ADULT - SUBJECTIVE AND OBJECTIVE BOX
MICU CONSULT NOTE      [id number]    Patient is a 89y old  Male who presents with a chief complaint of AFIB with RVR with syncope and collapse (18 Jul 2024 11:18)    HISTORY OF PRESENT ILLNESS:  89M PMHx Afib, HFrEFand systolic cardiomyopathy BIBEMS from [place] to [facility] ED. Went to [place] after syncope/collapse. Patient was standing by the front sink when he passed out he turned-  he felt dizzy and lightheaded and turned around and fell face forward onto the ground with +LOC, head strike, associated pain in upper mouth with loose tooth. Patient hit chin, nose, has a broken tooth, bruising. At [place] was found to have a SDH and in HR of 120-150s - received lopressor and Diltiazem in ED. Afib RVR, under control with metoprolol, restarted on Eliquis. Repeat CT scan obtained which is stable without evidence of acute hemorrhage. Mentating. Denies fever, chills, headache, vertigo, dizziness, palpitations, chest pain/tightness. neck pain, vision changes, weakness, numbness, tingling. Endorses bouts of diarrhea. Patient started to have 10 second runs of Vfib. Started on Amiodarone drip. Recurrent episodes of NSVT, symptomatic. MICU consulted.       MEDICATIONS  (STANDING):  aMIOdarone Infusion 0.5 mG/Min (16.7 mL/Hr) IV Continuous <Continuous>  aMIOdarone Infusion 1 mG/Min (33.3 mL/Hr) IV Continuous <Continuous>  cefTRIAXone Injectable. 1000 milliGRAM(s) IV Push every 24 hours  heparin  Infusion.  Unit(s)/Hr (11 mL/Hr) IV Continuous <Continuous>  metoprolol tartrate 25 milliGRAM(s) Oral every 6 hours  pantoprazole    Tablet 40 milliGRAM(s) Oral before breakfast  sacubitril 49 mG/valsartan 51 mG 1 Tablet(s) Oral two times a day  spironolactone 25 milliGRAM(s) Oral daily      MEDICATIONS  (PRN):  heparin   Injectable 5000 Unit(s) IV Push every 6 hours PRN For aPTT less than 40  heparin   Injectable 2500 Unit(s) IV Push every 6 hours PRN For aPTT between 40 - 57      Allergies    penicillins (Unknown)    Intolerances        PAST MEDICAL & SURGICAL HISTORY:  Afib      SOCIAL HISTORY  Smoking History: Denies  Denies Alcohol use     REVIEW OF SYSTEMS:    CONSTITUTIONAL:  No fevers, chills, sweats    HEENT:  Eyes:  No diplopia or blurred vision. ENT:  No earache, sore throat or runny nose.    CARDIOVASCULAR:  No pressure, squeezing, tightness, or heaviness about the chest; no palpitations.    RESPIRATORY:  No cough, shortness of breath, PND or orthopnea. Mild SOBOE    GASTROINTESTINAL:  No abdominal pain, nausea, vomiting. Multiple bouts of diarrhea.    GENITOURINARY:  No dysuria, frequency or urgency.    NEUROLOGIC:  No paresthesias, fasciculations, seizures or weakness.    PSYCHIATRIC:  No disorder of thought or mood.    Vital Signs Last 24 Hrs  T(C): 36.7 (18 Jul 2024 14:46), Max: 37.2 (18 Jul 2024 08:13)  T(F): 98 (18 Jul 2024 14:46), Max: 98.9 (18 Jul 2024 08:13)  HR: 60 (18 Jul 2024 14:46) (59 - 81)  BP: 129/60 (18 Jul 2024 14:46) (119/59 - 152/63)  BP(mean): 82 (18 Jul 2024 14:46) (82 - 109)  RR: 18 (18 Jul 2024 14:46) (18 - 20)  SpO2: 95% (18 Jul 2024 14:46) (93% - 97%)    Parameters below as of 18 Jul 2024 13:32  Patient On (Oxygen Delivery Method): room air        PHYSICAL EXAMINATION:    GENERAL: The patient is a well-developed, well-nourished and in no apparent distress.     HEENT: Head is normocephalic. Echymosis and laceration to chin/bridge of nose. Extraocular muscles are intact. Tenderness to left side of jaw. Mucous membranes are moist.     NECK: Supple.     LUNGS: Clear to auscultation without wheezing, rales, or rhonchi. Respirations unlabored    HEART: Regular rate and rhythm without murmur.    ABDOMEN: Soft, nontender, and nondistended.  No hepatosplenomegaly is noted.    EXTREMITIES: Without any cyanosis, clubbing, rash, lesions or edema.    NEUROLOGIC: Grossly intact.      LABS:                        13.0   9.11  )-----------( 202      ( 18 Jul 2024 05:30 )             38.4     07-18    140  |  104  |  15.0  ----------------------------<  83  3.7   |  25.0  |  0.81    Ca    8.3<L>      18 Jul 2024 05:30  Phos  3.3     07-17  Mg     2.2     07-17    TPro  6.3<L>  /  Alb  2.9<L>  /  TBili  0.8  /  DBili  x   /  AST  16  /  ALT  8   /  AlkPhos  57  07-17        MICROBIOLOGY:  Reviewed     RADIOLOGY & ADDITIONAL STUDIES:  Reviewed  MICU CONSULT NOTE      [id number]    Patient is a 89y old  Male who presents with a chief complaint of AFIB with RVR with syncope and collapse (18 Jul 2024 11:18)    HISTORY OF PRESENT ILLNESS:  89M PMHx Afib, HFrEF BIBEMS from [place] to [facility] ED. Went to [place] after syncope/collapse. Patient was standing by the front sink when he passed out he turned-  he felt dizzy and lightheaded and turned around and fell face forward onto the ground with +LOC, head strike, associated pain in upper mouth with loose tooth. Patient hit chin, nose, has a broken tooth, bruising. At [place] was found to have a SDH and in HR of 120-150s - received lopressor and Diltiazem in ED. Transferred to [facility] for neurosurgery eval. Afib RVR, under control with metoprolol, restarted on Eliquis. Repeat CT scan obtained which is stable without evidence of acute hemorrhage, neurosurgery has no plans for surgical intervention, cleared for resumption of AC.     24 hrs:   Patient Mentating well. Denies fever, chills, headache, vertigo, dizziness, palpitations, chest pain/tightness. neck pain, vision changes, weakness, numbness, tingling. Endorses bouts of diarrhea. Patient started to have 10 second runs of Vfib. Started on Amiodarone drip. Recurrent episodes of NSVT, symptomatic. MICU consulted.       MEDICATIONS  (STANDING):  aMIOdarone Infusion 0.5 mG/Min (16.7 mL/Hr) IV Continuous <Continuous>  aMIOdarone Infusion 1 mG/Min (33.3 mL/Hr) IV Continuous <Continuous>  cefTRIAXone Injectable. 1000 milliGRAM(s) IV Push every 24 hours  heparin  Infusion.  Unit(s)/Hr (11 mL/Hr) IV Continuous <Continuous>  metoprolol tartrate 25 milliGRAM(s) Oral every 6 hours  pantoprazole    Tablet 40 milliGRAM(s) Oral before breakfast  sacubitril 49 mG/valsartan 51 mG 1 Tablet(s) Oral two times a day  spironolactone 25 milliGRAM(s) Oral daily      MEDICATIONS  (PRN):  heparin   Injectable 5000 Unit(s) IV Push every 6 hours PRN For aPTT less than 40  heparin   Injectable 2500 Unit(s) IV Push every 6 hours PRN For aPTT between 40 - 57      Allergies    penicillins (Unknown)    Intolerances        PAST MEDICAL & SURGICAL HISTORY:  Afib      SOCIAL HISTORY  Smoking History: Denies  Denies Alcohol use     REVIEW OF SYSTEMS:    CONSTITUTIONAL:  No fevers, chills, sweats    HEENT:  Eyes:  No diplopia or blurred vision. ENT:  No earache, sore throat or runny nose.    CARDIOVASCULAR:  No pressure, squeezing, tightness, or heaviness about the chest; no palpitations.    RESPIRATORY:  No cough, shortness of breath, PND or orthopnea. Mild SOBOE    GASTROINTESTINAL:  No abdominal pain, nausea, vomiting. Multiple bouts of diarrhea.    GENITOURINARY:  No dysuria, frequency or urgency.    NEUROLOGIC:  No paresthesias, fasciculations, seizures or weakness.    PSYCHIATRIC:  No disorder of thought or mood.    Vital Signs Last 24 Hrs  T(C): 36.7 (18 Jul 2024 14:46), Max: 37.2 (18 Jul 2024 08:13)  T(F): 98 (18 Jul 2024 14:46), Max: 98.9 (18 Jul 2024 08:13)  HR: 60 (18 Jul 2024 14:46) (59 - 81)  BP: 129/60 (18 Jul 2024 14:46) (119/59 - 152/63)  BP(mean): 82 (18 Jul 2024 14:46) (82 - 109)  RR: 18 (18 Jul 2024 14:46) (18 - 20)  SpO2: 95% (18 Jul 2024 14:46) (93% - 97%)    Parameters below as of 18 Jul 2024 13:32  Patient On (Oxygen Delivery Method): room air        PHYSICAL EXAMINATION:    GENERAL: The patient is a well-developed, well-nourished and in no apparent distress.     HEENT: Head is normocephalic. Echymosis and laceration to chin/bridge of nose. Extraocular muscles are intact. Tenderness to left side of jaw. Mucous membranes are moist.     NECK: Supple.     LUNGS: Clear to auscultation without wheezing, rales, or rhonchi. Respirations unlabored    HEART: Regular rate and rhythm without murmur.    ABDOMEN: Soft, nontender, and nondistended.  No hepatosplenomegaly is noted.    EXTREMITIES: Without any cyanosis, clubbing, rash, lesions or edema.    NEUROLOGIC: Grossly intact.      LABS:                        13.0   9.11  )-----------( 202 ( 18 Jul 2024 05:30 )             38.4     07-18    140  |  104  |  15.0  ----------------------------<  83  3.7   |  25.0  |  0.81    Ca    8.3<L>      18 Jul 2024 05:30  Phos  3.3     07-17  Mg     2.2     07-17    TPro  6.3<L>  /  Alb  2.9<L>  /  TBili  0.8  /  DBili  x   /  AST  16  /  ALT  8   /  AlkPhos  57  07-17        MICROBIOLOGY:  Reviewed     RADIOLOGY & ADDITIONAL STUDIES:  Reviewed

## 2024-07-18 NOTE — CHART NOTE - NSCHARTNOTEFT_GEN_A_CORE
2 hour RRT follow up note coincided with time of 2nd RRT. Please refer to the note.  Pt. w/loose front teeth due to the fall. Please exercise caution for any procedures involving oral cavity.  D/w Medicine attending .  Pt. transferred to MICU 2 hour RRT follow up note coincided with time of 2nd RRT. Please refer to the note.  89M PMHx Afib, HFrEFand systolic cardiomyopathy transferred from [place] initially for evaluation of SDH, but today had multiple episodes of Torsades w/1st episode lasting 19sec.  Cardiology, EP Cardiology consulted. will need ischemic evaluation.  Pt. w/loose front teeth due to the fall, s/p syncope. Please exercise caution for any procedures involving oral cavity.  D/w Medicine attending .  Pt. received Mag 2gm during 1st RRT, on Amio drip, during 2nd RRT given Amio bolus, Lidocaine 1 amp, and started on Lidocaine drip.  Pt. transferred to MICU.

## 2024-07-18 NOTE — CONSULT NOTE ADULT - ASSESSMENT
89M PMHx Afib, HFrEFand systolic cardiomyopathy BIBEMS from [place] to [facility] ED. Admitted to MICU.     #NSVT   # Anterior-lateral ischemia   # Afib RVR  # Severe HFrEF     Neuro: Mentating, A&Ox3, does not know medical hx but can recant the events that lead him here. Neuro q4    Cardio: Hx Afib, HFrEF, daughter said EF was 15% , reordered TTE w/w/o con. Afib RVR controlled on metoprolol tartrate. Patient in VT for >10 sec multiple times today. ECG showing V1-V6 deep T-wave inversions. Amiodarone drip started w/o load. Given Lidocaine 50 mg 1x and started on Lidocaine drip. Trending Trop. Heparin drip started. EP following.     Pulm: SpO2 >92 on RA     GI: Multiple bouts of diarrhea, I/Os    Renal: No BROOK/CKD, trend BMP, UO    Diet: NPO   DVT: Heparin   Dispo: Acute requiring ICU level care  89M PMHx Afib, HFrEFand systolic cardiomyopathy BIBEMS from [place] to [facility] ED. Admitted to MICU.     #NSVT   # Anterior-lateral ischemia   # Afib RVR  # Severe HFrEF     Neuro: Mentating, A&Ox3, does not know medical hx but can recant the events that lead him here. Neuro q4    Cardio: Hx Afib, HFrEF, daughter said EF was 15% , reordered TTE w/w/o con. Afib RVR controlled on metoprolol tartrate. Patient in VT for >10 sec multiple times today. ECG showing V1-V6 deep T-wave inversions. Amiodarone drip started w/o load. Given Lidocaine 50 mg 1x and started on Lidocaine drip. Trending Trop. Heparin drip started. EP following.     Pulm: SpO2 >92 on RA     GI: Multiple bouts of diarrhea, GI PCR ordered, I/Os    Renal: No BROOK/CKD, trend BMP, UO    Endo: Maintain -180    Diet: NPO   DVT: Heparin   Dispo: Acute requiring ICU level care

## 2024-07-18 NOTE — PROGRESS NOTE ADULT - SUBJECTIVE AND OBJECTIVE BOX
Chief complaint: Afib/Torsades/Syncope    Patient seen and examined at bedside. No acute overnight events reported. This morning patient had multiple episodes of torsades. EKG noted. Amiodarone drip started and pads placed. No fever, chills, chest pain or shortness of breath.     Vital Signs Last 24 Hrs  T(F): 98.9 (18 Jul 2024 08:13), Max: 98.9 (18 Jul 2024 08:13)  HR: 71 (18 Jul 2024 08:13) (61 - 81)  BP: 136/61 (18 Jul 2024 08:13) (125/61 - 152/63)  RR: 18 (18 Jul 2024 08:13) (18 - 20)  SpO2: 97% (18 Jul 2024 08:13) (93% - 97%)    Physical Exam:  Constitutional: alert and oriented, in no acute distress   Neck: Soft and supple  Respiratory: Clear to auscultation bilaterally  Cardiovascular: Regular rate and rhyhtm  Gastrointestinal: Soft, non-tender to palpation, +bs  Vascular: 2+ peripheral pulses  Neurological: A/O x 3  Musculoskeletal: no lower extremity edema bilaterally    Labs:                        13.0   9.11  )-----------( 202      ( 18 Jul 2024 05:30 )             38.4   07-18    140  |  104  |  15.0  ----------------------------<  83  3.7   |  25.0  |  0.81    Ca    8.3<L>      18 Jul 2024 05:30  Phos  3.3     07-17  Mg     2.2     07-17    TPro  6.3<L>  /  Alb  2.9<L>  /  TBili  0.8  /  DBili  x   /  AST  16  /  ALT  8   /  AlkPhos  57  07-17

## 2024-07-18 NOTE — RAPID RESPONSE TEAM SUMMARY - NSSITUATIONBACKGROUNDRRT_GEN_ALL_CORE
Pt. in bed. with periods of Torsades. Monitored closely. Pt. is somnolent, but quickly arousable, oriented x3.  As per nursing frequent small loose BMs. C-diff neg (from this am)   Pt. in bed. with periods of Torsades. Monitored closely. Pt. is somnolent, but quickly arousable, oriented x3.   As per nursing frequent small loose BMs. C-diff neg (from this am).  Loose front teeth  15:03 HR 62, O2 sat 97%  15:05 /57, HR 51, O2 sat 93%

## 2024-07-18 NOTE — RAPID RESPONSE TEAM SUMMARY - NSADDTLFINDINGSRRT_GEN_ALL_CORE
Vital Signs Last 24 Hrs  T(C): 36.6 (18 Jul 2024 15:02), Max: 37.2 (18 Jul 2024 08:13)  T(F): 97.8 (18 Jul 2024 15:02), Max: 98.9 (18 Jul 2024 08:13)  HR: 57 (18 Jul 2024 15:02) (57 - 81)  BP: 146/57 (18 Jul 2024 15:02) (119/59 - 152/63)  BP(mean): 82 (18 Jul 2024 15:02) (82 - 109)  RR: 18 (18 Jul 2024 15:02) (18 - 20)  SpO2: 95% (18 Jul 2024 15:02) (93% - 97%)    Parameters below as of 18 Jul 2024 15:02  Patient On (Oxygen Delivery Method): room air

## 2024-07-18 NOTE — RAPID RESPONSE TEAM SUMMARY - NSADDTLFINDINGSRRT_GEN_ALL_CORE
Vital Signs Last 24 Hrs  T(C): 37.1 (18 Jul 2024 13:32), Max: 37.2 (18 Jul 2024 08:13)  T(F): 98.8 (18 Jul 2024 13:32), Max: 98.9 (18 Jul 2024 08:13)  HR: 59 (18 Jul 2024 13:32) (59 - 81)  BP: 123/71 (18 Jul 2024 13:32) (119/59 - 152/63)  BP(mean): 89 (18 Jul 2024 13:32) (89 - 109)  RR: 18 (18 Jul 2024 13:32) (18 - 20)  SpO2: 95% (18 Jul 2024 13:32) (93% - 97%)    Parameters below as of 18 Jul 2024 13:32  Patient On (Oxygen Delivery Method): room air

## 2024-07-18 NOTE — RAPID RESPONSE TEAM SUMMARY - NSOTHERINTERVENTIONSRRT_GEN_ALL_CORE
Cardiology and EP Cardiology notified   c/w Amio drip  Heparin drip to start at 5pm  ICU consulted  D/w Medicine attending  agreed w/plan

## 2024-07-18 NOTE — PHARMACOTHERAPY INTERVENTION NOTE - COMMENTS
Modified penicillin allergy history to state that patient tolerated ceftriaxone during this admission.      Kirstin More, PharmD   Clinical Pharmacy Specialist, Infectious Diseases  Tele-Antimicrobial Stewardship Program (Tele-ASP)  Tele-ASP Phone: [phone]  
Arrhythmia

## 2024-07-18 NOTE — PROGRESS NOTE ADULT - ASSESSMENT
89 y/o M with PMH of Afib [place] [facility] [place] found to have a SDH and -150s.     Torsades  Atrial Fibrillation with RVR  - Telemetry monitoring  - [place] [facility] [place] EF 15%  - Cardiology recs appreciated  - Eliquis held  - Start Heparin drip without bolus at 5PM  - NPO  - LHC per Cardiology   - Start Amiodarone drip  - Continue Lopressor 25mg q6h  - Continue Entresto  - Continue Aldactone 25mg daily    Bilateral subdural hygromas/hematoma  - Neurosurgery recommendations appreciated  - Per NSG, no absolute contraindication for Eliquis given on acute hemorrhage  - No antiseizure medications recommended    Leukocytosis  - Blood culture negative  - Urine culture positive for >100,000 Gram positive organisms  - Continue Ceftriaxone    DVT ppx  - Eliquis    Dispo: Acute pending [facility]

## 2024-07-18 NOTE — PROGRESS NOTE ADULT - SUBJECTIVE AND OBJECTIVE BOX
CC: ams    Patient seen and examined at the bedside. No acute overnight events. no events yesterday. Complaining of nothing today however pt w/ very limited verbal responses, unable to assess full ROS at length.       =========================================================================================    PHYSICAL EXAM.    GEN - appears age appropriate. frail. pleasant. no distress.   HEENT - NCAT, EOMI, CATHI. BL cataracts. noted to have spasms of bL face w/ high frequency  RESP - on supplemental O2. unable to speak in full sentences. noted audible gurgling, likely upper airway congestion from excess secretions  Neuro - Awake, alert, difficult to assess orientation as pt w/ limited verbal responses.   Psych - normal affect      VITAL SIGNS.    Vital Signs Last 24 Hrs  T(C): --  T(F): --  HR: --  BP: --  BP(mean): --  RR: --  SpO2: --        =================================================    LABS.                          10.4   13.36 )-----------( 217      ( 01 Jul 2019 09:03 )             33.8     06-30    157<H>  |  114<H>  |  17.0  ----------------------------<  136<H>  2.7<LL>   |  31.0<H>  |  0.91    Ca    7.6<L>      30 Jun 2019 14:13  Phos  0.8     07-01  Mg     1.7     07-01          I&O's Summary        ================================================    IMAGING.      ================================================    HOME MEDS.    Home Medications:  acetaminophen 325 mg rectal suppository: 1 suppository(ies) rectal every 6 hours, As needed, Mild Pain (1 - 3) (01 Jul 2019 12:22)  bisacodyl 10 mg rectal suppository: 1 suppository(ies) rectal once a day, As needed, Constipation (01 Jul 2019 12:22)  scopolamine 1.5 mg transdermal film, extended release: 1 patch transdermal every 3 days, As Needed excessive oral secretions (01 Jul 2019 12:22)      ================================================    HOSPITAL MEDS.    MEDICATIONS  (STANDING):    MEDICATIONS  (PRN):  acetaminophen  Suppository .. 325 milliGRAM(s) Rectal every 6 hours PRN Mild Pain (1 - 3)  bisacodyl Suppository 10 milliGRAM(s) Rectal daily PRN Constipation  LORazepam    Concentrate 0.25 milliGRAM(s) Oral every 4 hours PRN anxiety/agitation  morphine  - Injectable 2 milliGRAM(s) IV Push every 4 hours PRN Severe Pain (7 - 10)/respiratory distress  scopolamine   Patch 1 Patch Transdermal every 72 hours PRN excess oral secretions                 [facility]                                   [address] [place] [address]                                                      PHONE: [phone]                                                         FAX: [phone]  [email or web address]  ---------------------------------------------------------------------------------------------------------------------------------    Overnight events/patient complaints: Patient with episode of passing out this AM. Tele reviewed with episode of VT which correlates with time of syncope this AM. Patient now without cardiac complaints and spontaneously got out of VT.       penicillins (Unknown)    MEDICATIONS  (STANDING):  aMIOdarone Infusion 1 mG/Min (33.3 mL/Hr) IV Continuous <Continuous>  aMIOdarone Infusion 0.5 mG/Min (16.7 mL/Hr) IV Continuous <Continuous>  cefTRIAXone Injectable. 1000 milliGRAM(s) IV Push every 24 hours  metoprolol tartrate 25 milliGRAM(s) Oral every 6 hours  pantoprazole    Tablet 40 milliGRAM(s) Oral before breakfast  sacubitril 49 mG/valsartan 51 mG 1 Tablet(s) Oral two times a day  spironolactone 25 milliGRAM(s) Oral daily    MEDICATIONS  (PRN):      Vital Signs Last 24 Hrs  T(C): 37.2 (18 Jul 2024 08:13), Max: 37.2 (18 Jul 2024 08:13)  T(F): 98.9 (18 Jul 2024 08:13), Max: 98.9 (18 Jul 2024 08:13)  HR: 71 (18 Jul 2024 08:13) (61 - 89)  BP: 136/61 (18 Jul 2024 08:13) (106/52 - 152/63)  BP(mean): 90 (17 Jul 2024 19:55) (90 - 109)  RR: 18 (18 Jul 2024 08:13) (18 - 20)  SpO2: 97% (18 Jul 2024 08:13) (93% - 98%)    Parameters below as of 18 Jul 2024 08:13  Patient On (Oxygen Delivery Method): room air      ICU Vital Signs Last 24 Hrs  FER ALEX  I&O's Detail    17 Jul 2024 07:01  -  18 Jul 2024 07:00  --------------------------------------------------------  IN:    Oral Fluid: 236 mL  Total IN: 236 mL    OUT:    Indwelling Catheter - Urethral (mL): 150 mL    Voided (mL): 200 mL  Total OUT: 350 mL    Total NET: -114 mL        Drug Dosing Weight  FER ALEX      PHYSICAL EXAM:  General: NAD  HEENT: Head; normocephalic, Scab across bridge of nose  Eyes: EOMI, conjunctiva normal  Neck: Supple  CARDIOVASCULAR: Regular rate, irregular rhythm, S1 S2, 2/6 systolic murmur present  LUNGS: Clear to auscultation b/l, No rales, rhonchi or wheeze, normal inspiratory effort  ABDOMEN: Soft, nontender, non-distended, +bowel sounds  EXTREMITIES: No edema b/l, no cyanosis   SKIN: warm and dry  NEURO: Alert  PSYCH: normal affect.        LABS:                        13.0   9.11  )-----------( 202      ( 18 Jul 2024 05:30 )             38.4     07-18    140  |  104  |  15.0  ----------------------------<  83  3.7   |  25.0  |  0.81    Ca    8.3<L>      18 Jul 2024 05:30  Phos  3.3     07-17  Mg     2.2     07-17    TPro  6.3<L>  /  Alb  2.9<L>  /  TBili  0.8  /  DBili  x   /  AST  16  /  ALT  8   /  AlkPhos  57  07-17    FER ALEX        Urinalysis Basic - ( 18 Jul 2024 05:30 )    Color: x / Appearance: x / SG: x / pH: x  Gluc: 83 mg/dL / Ketone: x  / Bili: x / Urobili: x   Blood: x / Protein: x / Nitrite: x   Leuk Esterase: x / RBC: x / WBC x   Sq Epi: x / Non Sq Epi: x / Bacteria: x        RADIOLOGY & ADDITIONAL STUDIES:    INTERPRETATION OF TELEMETRY (personally reviewed): 18 second episode of VT ~250bpm at 7:28 AM     ECHOCARDIOGRAM :  1. Left ventricular cavity is normal in size. Left ventricular systolic function is severely decreased with an ejection fraction visually estimated at 15 %. Global left ventricular hypokinesis.  2. There is moderate (grade 2) left ventricular diastolic dysfunction. Analysis of left ventricular diastolic function and filling pressure is made challenging by the presence of atrial fibrillation.  3. Normal right ventricular cavity size and reduced systolic function.  4. Mild pulmonic regurgitation.  5. Moderate mitral regurgitation.  6. Mild aortic stenosis. low flow, low gradient aortic stenosis with reduced EF.  7. Mild to moderate aortic regurgitation.  8. Moderate tricuspid regurgitation.  9. The inferior vena cava is dilated measuring 2.20 cm in diameter, (dilated >2.1cm) with normal inspiratory collapse (normal >50%) consistent with mildly elevated right atrial pressure (\R\8, range 5-10mmHg).  10. Estimated pulmonary artery systolic pressure is 22 mmHg, consistent with normal pulmonary artery pressure.  11. Small circumferential pericardial effusion.  12. Small right pleural effusion noted.      ASSESSMENT AND PLAN:    89 year old male with a PMHx of Afib on eliquis and HFrEF (diagnosed 6 weeks ago at [facility]) (patient refused cath at that time) who presents from [facility] with syncopal episode (due to possible SDH however found to by hygromas and cleared for anticoagulation by neurosurgery) now found to have VT on tele.     VT  -had long discussion with patient and daughter. Will plan for cardiac catheterization for evaluation of coronary ischemia as cause of VT/severely reduced EF  -hold eliquis. Last dose at 5AM this morning. Start heparin gtt without bolus at 5PM today  -will have EP evaluate for ICD  -make patient NPO. Patient did eat breakfast this AM ~9AM  -echo from [facility] as above  -monitor on stepdown  -zoll pads on patient at all times  -continue to monitor on tele  -would start amiodarone gtt   -continue lopressor 25mg q6h  -continue entresto 49/51mg BID  -continue aldactone 25mg daily  -keep K to 4, Mg to 2    Patient's Outpatient Cardiologist: Dr. Roger ([facility])    Thank you for letting [facility] to assist in the management of this patient. Please call with any questions.                 [facility], [facility]                                   [address] [place] [address]                                                      PHONE: [phone]                                                         FAX: [phone]  [email or web address]  ---------------------------------------------------------------------------------------------------------------------------------    Overnight events/patient complaints: Patient with episode of passing out this AM. Tele reviewed with episode of torsades which correlates with time of syncope this AM. Patient now without cardiac complaints and spontaneously got out of torsades.       penicillins (Unknown)    MEDICATIONS  (STANDING):  aMIOdarone Infusion 1 mG/Min (33.3 mL/Hr) IV Continuous <Continuous>  aMIOdarone Infusion 0.5 mG/Min (16.7 mL/Hr) IV Continuous <Continuous>  cefTRIAXone Injectable. 1000 milliGRAM(s) IV Push every 24 hours  metoprolol tartrate 25 milliGRAM(s) Oral every 6 hours  pantoprazole    Tablet 40 milliGRAM(s) Oral before breakfast  sacubitril 49 mG/valsartan 51 mG 1 Tablet(s) Oral two times a day  spironolactone 25 milliGRAM(s) Oral daily    MEDICATIONS  (PRN):      Vital Signs Last 24 Hrs  T(C): 37.2 (18 Jul 2024 08:13), Max: 37.2 (18 Jul 2024 08:13)  T(F): 98.9 (18 Jul 2024 08:13), Max: 98.9 (18 Jul 2024 08:13)  HR: 71 (18 Jul 2024 08:13) (61 - 89)  BP: 136/61 (18 Jul 2024 08:13) (106/52 - 152/63)  BP(mean): 90 (17 Jul 2024 19:55) (90 - 109)  RR: 18 (18 Jul 2024 08:13) (18 - 20)  SpO2: 97% (18 Jul 2024 08:13) (93% - 98%)    Parameters below as of 18 Jul 2024 08:13  Patient On (Oxygen Delivery Method): room air      ICU Vital Signs Last 24 Hrs  FER JOHNSON  I&O's Detail    17 Jul 2024 07:01  -  18 Jul 2024 07:00  --------------------------------------------------------  IN:    Oral Fluid: 236 mL  Total IN: 236 mL    OUT:    Indwelling Catheter - Urethral (mL): 150 mL    Voided (mL): 200 mL  Total OUT: 350 mL    Total NET: -114 mL        Drug Dosing Weight  FER JOHNSON      PHYSICAL EXAM:  General: NAD  HEENT: Head; normocephalic, Scab across bridge of nose  Eyes: EOMI, conjunctiva normal  Neck: Supple  CARDIOVASCULAR: Regular rate, irregular rhythm, S1 S2, 2/6 systolic murmur present  LUNGS: Clear to auscultation b/l, No rales, rhonchi or wheeze, normal inspiratory effort  ABDOMEN: Soft, nontender, non-distended, +bowel sounds  EXTREMITIES: No edema b/l, no cyanosis   SKIN: warm and dry  NEURO: Alert  PSYCH: normal affect.        LABS:                        13.0   9.11  )-----------( 202      ( 18 Jul 2024 05:30 )             38.4     07-18    140  |  104  |  15.0  ----------------------------<  83  3.7   |  25.0  |  0.81    Ca    8.3<L>      18 Jul 2024 05:30  Phos  3.3     07-17  Mg     2.2     07-17    TPro  6.3<L>  /  Alb  2.9<L>  /  TBili  0.8  /  DBili  x   /  AST  16  /  ALT  8   /  AlkPhos  57  07-17    [facility]        Urinalysis Basic - ( 18 Jul 2024 05:30 )    Color: x / Appearance: x / SG: x / pH: x  Gluc: 83 mg/dL / Ketone: x  / Bili: x / Urobili: x   Blood: x / Protein: x / Nitrite: x   Leuk Esterase: x / RBC: x / WBC x   Sq Epi: x / Non Sq Epi: x / Bacteria: x        RADIOLOGY & ADDITIONAL STUDIES:    INTERPRETATION OF TELEMETRY (personally reviewed): 18 second episode of VT ~250bpm at 7:28 AM     ECHOCARDIOGRAM :  1. Left ventricular cavity is normal in size. Left ventricular systolic function is severely decreased with an ejection fraction visually estimated at 15 %. Global left ventricular hypokinesis.  2. There is moderate (grade 2) left ventricular diastolic dysfunction. Analysis of left ventricular diastolic function and filling pressure is made challenging by the presence of atrial fibrillation.  3. Normal right ventricular cavity size and reduced systolic function.  4. Mild pulmonic regurgitation.  5. Moderate mitral regurgitation.  6. Mild aortic stenosis. low flow, low gradient aortic stenosis with reduced EF.  7. Mild to moderate aortic regurgitation.  8. Moderate tricuspid regurgitation.  9. The inferior vena cava is dilated measuring 2.20 cm in diameter, (dilated >2.1cm) with normal inspiratory collapse (normal >50%) consistent with mildly elevated right atrial pressure (\R\8, range 5-10mmHg).  10. Estimated pulmonary artery systolic pressure is 22 mmHg, consistent with normal pulmonary artery pressure.  11. Small circumferential pericardial effusion.  12. Small right pleural effusion noted.      ASSESSMENT AND PLAN:    89 year old male with a PMHx of Afib on eliquis and HFrEF (diagnosed 6 weeks ago at [facility]) (patient refused cath at that time) who presents from [facility] with syncopal episode (due to possible SDH however found to by hygromas and cleared for anticoagulation by neurosurgery) now found to have torsades on tele.     Torsades  -had long discussion with patient and daughter. Will plan for cardiac catheterization for evaluation of coronary ischemia as cause of VT/severely reduced EF  -hold eliquis. Last dose at 5AM this morning. Start heparin gtt without bolus at 5PM today  -will have EP evaluate for ICD. Case discussed with EP, Dr. Miller  -make patient NPO. Patient did eat breakfast this AM ~9AM  -echo from [facility] as above  -monitor on stepdown  -zoll pads on patient at all times  -continue to monitor on tele  -would start amiodarone gtt   -continue lopressor 25mg q6h  -continue entresto 49/51mg BID  -continue aldactone 25mg daily  -keep K to 4, Mg to 2    Patient's Outpatient Cardiologist: Dr. Roger ([facility])    Thank you for letting [facility] to assist in the management of this patient. Please call with any questions.

## 2024-07-18 NOTE — RAPID RESPONSE TEAM SUMMARY - NSOTHERINTERVENTIONSRRT_GEN_ALL_CORE
ICU at the bedside  Blood work obtained  CBC, CMP, Mg, phos, trop, CPK, PT/PTT/INR, ammonia, lactate ICU at the bedside  Blood work obtained  CBC, CMP, Mg, phos, trop, CPK, PT/PTT/INR, ammonia, lactate  D/w Medicine attending  D/w ICU attending

## 2024-07-19 LAB
ALBUMIN SERPL ELPH-MCNC: 2.9 G/DL — LOW (ref 3.3–5.2)
ALBUMIN SERPL ELPH-MCNC: 3 G/DL — LOW (ref 3.3–5.2)
ALP SERPL-CCNC: 60 U/L — SIGNIFICANT CHANGE UP (ref 40–120)
ALP SERPL-CCNC: 72 U/L — SIGNIFICANT CHANGE UP (ref 40–120)
ALT FLD-CCNC: 7 U/L — SIGNIFICANT CHANGE UP
ALT FLD-CCNC: 7 U/L — SIGNIFICANT CHANGE UP
ANION GAP SERPL CALC-SCNC: 12 MMOL/L — SIGNIFICANT CHANGE UP (ref 5–17)
ANION GAP SERPL CALC-SCNC: 17 MMOL/L — SIGNIFICANT CHANGE UP (ref 5–17)
APTT BLD: 106.3 SEC — HIGH (ref 24.5–35.6)
APTT BLD: 107 SEC — HIGH (ref 24.5–35.6)
AST SERPL-CCNC: 15 U/L — SIGNIFICANT CHANGE UP
AST SERPL-CCNC: 17 U/L — SIGNIFICANT CHANGE UP
BASOPHILS # BLD AUTO: 0.06 K/UL — SIGNIFICANT CHANGE UP (ref 0–0.2)
BASOPHILS NFR BLD AUTO: 0.6 % — SIGNIFICANT CHANGE UP (ref 0–2)
BILIRUB SERPL-MCNC: 0.8 MG/DL — SIGNIFICANT CHANGE UP (ref 0.4–2)
BILIRUB SERPL-MCNC: 1 MG/DL — SIGNIFICANT CHANGE UP (ref 0.4–2)
BUN SERPL-MCNC: 10.6 MG/DL — SIGNIFICANT CHANGE UP (ref 8–20)
BUN SERPL-MCNC: 13 MG/DL — SIGNIFICANT CHANGE UP (ref 8–20)
CALCIUM SERPL-MCNC: 8.2 MG/DL — LOW (ref 8.4–10.5)
CALCIUM SERPL-MCNC: 8.8 MG/DL — SIGNIFICANT CHANGE UP (ref 8.4–10.5)
CHLORIDE SERPL-SCNC: 102 MMOL/L — SIGNIFICANT CHANGE UP (ref 96–108)
CHLORIDE SERPL-SCNC: 95 MMOL/L — LOW (ref 96–108)
CO2 SERPL-SCNC: 20 MMOL/L — LOW (ref 22–29)
CO2 SERPL-SCNC: 22 MMOL/L — SIGNIFICANT CHANGE UP (ref 22–29)
CREAT SERPL-MCNC: 0.76 MG/DL — SIGNIFICANT CHANGE UP (ref 0.5–1.3)
CREAT SERPL-MCNC: 0.81 MG/DL — SIGNIFICANT CHANGE UP (ref 0.5–1.3)
CULTURE RESULTS: ABNORMAL
EGFR: 85 ML/MIN/1.73M2 — SIGNIFICANT CHANGE UP
EGFR: 87 ML/MIN/1.73M2 — SIGNIFICANT CHANGE UP
EOSINOPHIL # BLD AUTO: 0.49 K/UL — SIGNIFICANT CHANGE UP (ref 0–0.5)
EOSINOPHIL NFR BLD AUTO: 4.5 % — SIGNIFICANT CHANGE UP (ref 0–6)
GLUCOSE BLDC GLUCOMTR-MCNC: 111 MG/DL — HIGH (ref 70–99)
GLUCOSE BLDC GLUCOMTR-MCNC: 159 MG/DL — HIGH (ref 70–99)
GLUCOSE SERPL-MCNC: 120 MG/DL — HIGH (ref 70–99)
GLUCOSE SERPL-MCNC: 139 MG/DL — HIGH (ref 70–99)
HCT VFR BLD CALC: 38.2 % — LOW (ref 39–50)
HCT VFR BLD CALC: 45.7 % — SIGNIFICANT CHANGE UP (ref 39–50)
HGB BLD-MCNC: 13.2 G/DL — SIGNIFICANT CHANGE UP (ref 13–17)
HGB BLD-MCNC: 15.5 G/DL — SIGNIFICANT CHANGE UP (ref 13–17)
IMM GRANULOCYTES NFR BLD AUTO: 0.4 % — SIGNIFICANT CHANGE UP (ref 0–0.9)
LYMPHOCYTES # BLD AUTO: 1.33 K/UL — SIGNIFICANT CHANGE UP (ref 1–3.3)
LYMPHOCYTES # BLD AUTO: 12.3 % — LOW (ref 13–44)
MAGNESIUM SERPL-MCNC: 2.4 MG/DL — SIGNIFICANT CHANGE UP (ref 1.6–2.6)
MAGNESIUM SERPL-MCNC: 2.5 MG/DL — SIGNIFICANT CHANGE UP (ref 1.6–2.6)
MCHC RBC-ENTMCNC: 29.6 PG — SIGNIFICANT CHANGE UP (ref 27–34)
MCHC RBC-ENTMCNC: 30.1 PG — SIGNIFICANT CHANGE UP (ref 27–34)
MCHC RBC-ENTMCNC: 33.9 GM/DL — SIGNIFICANT CHANGE UP (ref 32–36)
MCHC RBC-ENTMCNC: 34.6 GM/DL — SIGNIFICANT CHANGE UP (ref 32–36)
MCV RBC AUTO: 87.2 FL — SIGNIFICANT CHANGE UP (ref 80–100)
MCV RBC AUTO: 87.4 FL — SIGNIFICANT CHANGE UP (ref 80–100)
MONOCYTES # BLD AUTO: 1.1 K/UL — HIGH (ref 0–0.9)
MONOCYTES NFR BLD AUTO: 10.2 % — SIGNIFICANT CHANGE UP (ref 2–14)
MRSA PCR RESULT.: SIGNIFICANT CHANGE UP
NEUTROPHILS # BLD AUTO: 7.75 K/UL — HIGH (ref 1.8–7.4)
NEUTROPHILS NFR BLD AUTO: 72 % — SIGNIFICANT CHANGE UP (ref 43–77)
PHOSPHATE SERPL-MCNC: 2.4 MG/DL — SIGNIFICANT CHANGE UP (ref 2.4–4.7)
PHOSPHATE SERPL-MCNC: 2.7 MG/DL — SIGNIFICANT CHANGE UP (ref 2.4–4.7)
PLATELET # BLD AUTO: 214 K/UL — SIGNIFICANT CHANGE UP (ref 150–400)
PLATELET # BLD AUTO: 236 K/UL — SIGNIFICANT CHANGE UP (ref 150–400)
POTASSIUM SERPL-MCNC: 4.6 MMOL/L — SIGNIFICANT CHANGE UP (ref 3.5–5.3)
POTASSIUM SERPL-MCNC: 4.6 MMOL/L — SIGNIFICANT CHANGE UP (ref 3.5–5.3)
POTASSIUM SERPL-SCNC: 4.6 MMOL/L — SIGNIFICANT CHANGE UP (ref 3.5–5.3)
POTASSIUM SERPL-SCNC: 4.6 MMOL/L — SIGNIFICANT CHANGE UP (ref 3.5–5.3)
PROT SERPL-MCNC: 6.1 G/DL — LOW (ref 6.6–8.7)
PROT SERPL-MCNC: 6.9 G/DL — SIGNIFICANT CHANGE UP (ref 6.6–8.7)
RBC # BLD: 4.38 M/UL — SIGNIFICANT CHANGE UP (ref 4.2–5.8)
RBC # BLD: 5.23 M/UL — SIGNIFICANT CHANGE UP (ref 4.2–5.8)
RBC # FLD: 15 % — HIGH (ref 10.3–14.5)
RBC # FLD: 15.3 % — HIGH (ref 10.3–14.5)
S AUREUS DNA NOSE QL NAA+PROBE: SIGNIFICANT CHANGE UP
SODIUM SERPL-SCNC: 132 MMOL/L — LOW (ref 135–145)
SODIUM SERPL-SCNC: 136 MMOL/L — SIGNIFICANT CHANGE UP (ref 135–145)
SPECIMEN SOURCE: SIGNIFICANT CHANGE UP
WBC # BLD: 10.77 K/UL — HIGH (ref 3.8–10.5)
WBC # BLD: 12.54 K/UL — HIGH (ref 3.8–10.5)
WBC # FLD AUTO: 10.77 K/UL — HIGH (ref 3.8–10.5)
WBC # FLD AUTO: 12.54 K/UL — HIGH (ref 3.8–10.5)

## 2024-07-19 PROCEDURE — 99291 CRITICAL CARE FIRST HOUR: CPT | Mod: GC

## 2024-07-19 PROCEDURE — 93010 ELECTROCARDIOGRAM REPORT: CPT | Mod: 76

## 2024-07-19 PROCEDURE — 70450 CT HEAD/BRAIN W/O DYE: CPT | Mod: 26

## 2024-07-19 RX ORDER — ASPIRIN 500 MG
81 TABLET ORAL ONCE
Refills: 0 | Status: COMPLETED | OUTPATIENT
Start: 2024-07-19 | End: 2024-07-19

## 2024-07-19 RX ORDER — TAMSULOSIN HCL 0.4 MG
0.4 CAPSULE ORAL AT BEDTIME
Refills: 0 | Status: DISCONTINUED | OUTPATIENT
Start: 2024-07-19 | End: 2024-07-26

## 2024-07-19 RX ORDER — ATORVASTATIN CALCIUM 40 MG/1
40 TABLET, FILM COATED ORAL AT BEDTIME
Refills: 0 | Status: DISCONTINUED | OUTPATIENT
Start: 2024-07-19 | End: 2024-07-30

## 2024-07-19 RX ORDER — CLOPIDOGREL BISULFATE 75 MG/1
75 TABLET, FILM COATED ORAL DAILY
Refills: 0 | Status: DISCONTINUED | OUTPATIENT
Start: 2024-07-20 | End: 2024-07-30

## 2024-07-19 RX ORDER — DOPAMINE HCL 200 MG/5ML
10 VIAL (ML) INTRAVENOUS
Qty: 400 | Refills: 0 | Status: DISCONTINUED | OUTPATIENT
Start: 2024-07-19 | End: 2024-07-19

## 2024-07-19 RX ORDER — DOPAMINE HCL 200 MG/5ML
5 VIAL (ML) INTRAVENOUS
Qty: 400 | Refills: 0 | Status: DISCONTINUED | OUTPATIENT
Start: 2024-07-19 | End: 2024-07-19

## 2024-07-19 RX ORDER — AMIODARONE HYDROCHLORIDE 50 MG/ML
0.5 INJECTION, SOLUTION INTRAVENOUS
Qty: 450 | Refills: 0 | Status: DISCONTINUED | OUTPATIENT
Start: 2024-07-19 | End: 2024-07-19

## 2024-07-19 RX ORDER — ASPIRIN 500 MG
81 TABLET ORAL DAILY
Refills: 0 | Status: DISCONTINUED | OUTPATIENT
Start: 2024-07-20 | End: 2024-07-29

## 2024-07-19 RX ORDER — DOPAMINE HCL 200 MG/5ML
4.99 VIAL (ML) INTRAVENOUS
Qty: 400 | Refills: 0 | Status: DISCONTINUED | OUTPATIENT
Start: 2024-07-19 | End: 2024-07-20

## 2024-07-19 RX ADMIN — SPIRONOLACTONE 25 MILLIGRAM(S): 50 TABLET, FILM COATED ORAL at 06:31

## 2024-07-19 RX ADMIN — Medication 25 MILLIGRAM(S): at 00:49

## 2024-07-19 RX ADMIN — Medication 50 MILLILITER(S): at 00:50

## 2024-07-19 RX ADMIN — PANTOPRAZOLE SODIUM 40 MILLIGRAM(S): 20 TABLET, DELAYED RELEASE ORAL at 06:31

## 2024-07-19 RX ADMIN — Medication 0.4 MILLIGRAM(S): at 23:07

## 2024-07-19 RX ADMIN — Medication 15 MG/MIN: at 18:52

## 2024-07-19 RX ADMIN — AMIODARONE HYDROCHLORIDE 16.7 MG/MIN: 50 INJECTION, SOLUTION INTRAVENOUS at 01:21

## 2024-07-19 RX ADMIN — HEPARIN SODIUM 1000 UNIT(S)/HR: 1000 INJECTION, SOLUTION INTRAVENOUS; SUBCUTANEOUS at 06:32

## 2024-07-19 RX ADMIN — HEPARIN SODIUM 900 UNIT(S)/HR: 1000 INJECTION, SOLUTION INTRAVENOUS; SUBCUTANEOUS at 14:56

## 2024-07-19 RX ADMIN — Medication 81 MILLIGRAM(S): at 16:35

## 2024-07-19 RX ADMIN — Medication 1000 MILLIGRAM(S): at 18:50

## 2024-07-19 RX ADMIN — CHLORHEXIDINE GLUCONATE 1 APPLICATION(S): 500 CLOTH TOPICAL at 11:31

## 2024-07-19 RX ADMIN — Medication 5 UNIT(S): at 00:59

## 2024-07-19 RX ADMIN — SACUBITRIL AND VALSARTAN 1 TABLET(S): 49; 51 TABLET, FILM COATED ORAL at 06:31

## 2024-07-19 RX ADMIN — Medication 12 MICROGRAM(S)/KG/MIN: at 18:53

## 2024-07-19 RX ADMIN — AMIODARONE HYDROCHLORIDE 16.7 MG/MIN: 50 INJECTION, SOLUTION INTRAVENOUS at 11:45

## 2024-07-19 RX ADMIN — ATORVASTATIN CALCIUM 40 MILLIGRAM(S): 40 TABLET, FILM COATED ORAL at 23:07

## 2024-07-19 NOTE — PROGRESS NOTE ADULT - ASSESSMENT
Assessment and Recommendation:   · Assessment    89M PMHx Afib, HFrEFand systolic cardiomyopathy BIBEMS from [place] to [facility] ED. Admitted to MICU.     #NSVT   # Anterior-lateral ischemia   # Afib RVR  # Severe HFrEF     Neuro: Mentating, A&Ox3, does not know medical hx but can recant the events that lead him here. Neuro q4    Cardio: Hx Afib,  repeat TTE 7/18 showed EF 24%. Afib RVR controlled on metoprolol tartrate. Patient in VT for >10 sec multiple times yesterday. ECG showing V1-V6 deep T-wave inversions. Amiodarone drip started w/o load. Given Lidocaine 50 mg 1x and started on Lidocaine drip. Trending Trop. Heparin drip started. EP following. C today.     Pulm: SpO2 >92 on RA     GI: Multiple bouts of diarrhea, GI PCR ordered, I/Os    Renal: No BROOK/CKD, trend BMP, UO    Endo: Maintain -180    Diet: NPO   DVT: Heparin   Dispo: Acute requiring ICU level care

## 2024-07-19 NOTE — DIETITIAN INITIAL EVALUATION ADULT - PERTINENT LABORATORY DATA
07-19    136  |  102  |  13.0  ----------------------------<  120<H>  4.6   |  22.0  |  0.76    Ca    8.2<L>      19 Jul 2024 03:56  Phos  2.4     07-19  Mg     2.5     07-19    TPro  6.1<L>  /  Alb  2.9<L>  /  TBili  0.8  /  DBili  x   /  AST  15  /  ALT  7   /  AlkPhos  60  07-19  POCT Blood Glucose.: 121 mg/dL (07-18-24 @ 12:15)  A1C with Estimated Average Glucose Result: 6.4 % (07-16-24 @ 20:38)

## 2024-07-19 NOTE — CHART NOTE - NSCHARTNOTEFT_GEN_A_CORE
Now s/p LHC via RRA with Dr. Vivek Altamirano, pt tolerated procedure well. Pt transported to MICU in NAD and HDS, RRA access site stable with radial band in place, no bleed/hematoma, distal pulse +2, RUE/ Right hand remains acyanotic; warm to touch; motor/sensory function intact.    Intraprocedurally: Lidocaine 1% 5ml; Verapamil 5mg IA; Heparin 5,000 units IV; Omnipaque 110ml; Clopidogrel 600mg PO x1 load intra procedure lab  Findings:   Left main artery: Angiography shows minor irregularities.    Left anterior descending artery: 80% stenosis proximal LAD s/p 2 BABAK (NEYMAR FRONTIER 3.0x38MM) (NEYMAR FRONTIER 3.5x22MM)  1st Diagonal artery: 95% stenosis s/p 1 BABAK (NEYMAR FRONTIER 2.5x15MM)  Circumflex: Angiography shows minor irregularities.    Right coronary artery: Moderate atherosclerosis 50% stenosis  Ramus intermedius: moderate atherosclerosis 50% stenosis    Post procedure patient denies chest pain, chest pressure, shortness of breath, palpitations, dizziness, indigestion or abdominal complaints including nausea, vomiting.   After Dopamine infusion initiated, patient with no further episodes of PMVT.     Plan:  -Formal cath report pending  -Post procedure management/monitoring per protocol  -Access site precautions  -Radial compression band removal at 730 pm if RRA site stable w/o active bleeding or wrist hematoma  -Labs and EKG in am  -Post procedure IV fluid bolus held due to low EF  -Repeat ECG if any clinical indication or change on tele  -s/p clopidogrel load 600mg PO x1 intraprocedure lab  -Dual anti platelet therapy with aspirin/plavix   -Betablocker on hold due to sinus bradycardia and prolonged QT. Currently on IV dopamine infusion  -start atorvastatin 40mg PO QHS  -Ok to resume IV heparin infusion 6 hours after radial band removed. Please closely monitor CBC/ PTT q6 hours while on Heparin infusion  -Educated regarding strict adherence with DAPT   -Educated regarding post procedure management and care  -Discussed the importance of RF modification  -Cardiac rehab info provided/referral and communication to cardiac rehab completed  -DISPO: Further plan per MICU, [facility] and EP team.

## 2024-07-19 NOTE — DIETITIAN INITIAL EVALUATION ADULT - PERTINENT MEDS FT
MEDICATIONS  (STANDING):  aMIOdarone Infusion 0.501 mG/Min (16.7 mL/Hr) IV Continuous <Continuous>  cefTRIAXone Injectable. 1000 milliGRAM(s) IV Push every 24 hours  chlorhexidine 2% Cloths 1 Application(s) Topical daily  heparin  Infusion.  Unit(s)/Hr (11 mL/Hr) IV Continuous <Continuous>  lidocaine   Infusion 1 mG/Min (7.5 mL/Hr) IV Continuous <Continuous>  metoprolol tartrate 25 milliGRAM(s) Oral every 6 hours  pantoprazole    Tablet 40 milliGRAM(s) Oral before breakfast  sacubitril 49 mG/valsartan 51 mG 1 Tablet(s) Oral two times a day  spironolactone 25 milliGRAM(s) Oral daily    MEDICATIONS  (PRN):  heparin   Injectable 5000 Unit(s) IV Push every 6 hours PRN For aPTT less than 40

## 2024-07-19 NOTE — DIETITIAN INITIAL EVALUATION ADULT - REASON INDICATOR FOR ASSESSMENT
Problem: Knowledge Deficit - Standard  Goal: Patient and family/care givers will demonstrate understanding of plan of care, disease process/condition, diagnostic tests and medications  Outcome: Progressing     Problem: Psychosocial  Goal: Patient will experience minimized separation anxiety and fear  Outcome: Progressing     Problem: Security Measures  Goal: Patient and family will demonstrate understanding of security measures  Outcome: Progressing   The patient is Stable - Low risk of patient condition declining or worsening    Shift Goals  Clinical Goals: afebrile  Patient Goals: home soon, [facility] to [facility]  Family Goals: education, rest    Progress made toward(s) clinical / shift goals:  afebrile, ok to leave flr with family    Patient is not progressing towards the following goals:       ICU admission

## 2024-07-19 NOTE — CONSULT NOTE ADULT - SUBJECTIVE AND OBJECTIVE BOX
[facility]                                              INTERVENTIONAL CARDIOLOGY AT [facility]                                                   [address], [place] [address]                                             Telephone: [phone]. Fax:[phone]                                                       INTERVENTIONAL CARDIOLOGY CONSULTATION NOTE                                                                                             History obtained by: Patient and medical record  Reason for Consultation: Evaluation for cardiac catheterization  Available pt records reviewed: Yes [ x ] No [  ]    Chief complaint:    Patient is a 89y old  Male who presents with a chief complaint of AFIB with RVR with syncope and collapse (19 Jul 2024 10:23)      HPI:  89 year old male with known Afib  and [facility] and [facility] and fall and bruises face swollen lip bruised forehead and face nose - found to have a SDH and in HR of 120-150s - received lopressor and Diltiazem in ED .  doesn't know who his cardiologist denied CP or orthopnea or VELA or edema   he was by the front sink when he passed outas he turned-  he felt dizzy and lightheaded and turned around and fell face forward onto the ground with +LOC associated pain in upper mouth with loose tooth.   Denies vertigo , CP , Palpitations, headache, neck pain, vision changes, weakness, numbness, tingling.   Pt presented with Afib with RVR with stable BP with associated nausea and vomiting. CTH obtained upon arrival.  currently denies headache, nausea, vomiting, vision changes.   Endorses facial pain and left hand pain.  Repeat CT scan obtained which is stable without evidence of acute hemorrhage   he doesn't know his home meds- is pleasant but not a great historian- states he lives alone     Collateral hx from daughter Maritza-he has a"weak heart pumps "15%" his medications are Eliquis, Entresto and spironolactone and h takes the meds himself but she sets them up for him  he has been in his usual self -   discussed with neuro- OK to start  home Eliquis - Hygroma    (16 Jul 2024 08:47)        PAST MEDICAL HISTORY  Afib        PAST SURGICAL HISTORY      HOME MEDICATIONS:  Eliquis 2.5 mg oral tablet: 1 tab(s) orally 2 times a day (16 Jul 2024 17:12)  Entresto 24 mg-26 mg oral tablet: 1 tab(s) orally once a day (16 Jul 2024 17:13)  Lasix 40 mg oral tablet: 1 tab(s) orally once a day (16 Jul 2024 17:12)  spironolactone 25 mg oral tablet: 1 tab(s) orally once a day (16 Jul 2024 17:13)      CURRENT CARDIAC MEDICATIONS:  DOPamine Infusion 10 MICROgram(s)/kG/Min IV Continuous <Continuous>  lidocaine   Infusion 2 mG/Min IV Continuous <Continuous>  sacubitril 49 mG/valsartan 51 mG 1 Tablet(s) Oral two times a day  spironolactone 25 milliGRAM(s) Oral daily      ALLERGIES:   penicillins (Unknown)      REVIEW OF SYMPTOMS:   CONSTITUTIONAL: no fever, no chills, no weight loss, no weight gain, no fatigue   CARDIOVASCULAR: denies chest pain, palpitations, dizziness, shortness of breath  RESPIRATORY: no Shortness of breath, no cough, no wheezing  : No dysuria, no hematuria   GI: No dark color stool, no nausea, no diarrhea, no constipation, no abdominal pain   NEURO: No headache, no slurred speech   ALL OTHER REVIEW OF SYSTEMS ARE NEGATIVE.    VITAL SIGNS:  T(C): 36.4 (07-19-24 @ 15:10), Max: 36.8 (07-18-24 @ 21:54)  T(F): 97.6 (07-19-24 @ 15:10), Max: 98.3 (07-18-24 @ 21:54)  HR: 59 (07-19-24 @ 15:10) (50 - 78)  BP: 115/69 (07-19-24 @ 15:10) (109/63 - 156/72)  RR: 20 (07-19-24 @ 15:10) (10 - 26)  SpO2: 99% (07-19-24 @ 15:10) (88% - 100%)    INTAKE AND OUTPUT:     07-18 @ 07:01  -  07-19 @ 07:00  --------------------------------------------------------  IN: 1018.8 mL / OUT: 710 mL / NET: 308.8 mL    07-19 @ 07:01  -  07-19 @ 16:36  --------------------------------------------------------  IN: 239.4 mL / OUT: 175 mL / NET: 64.4 mL        PHYSICAL EXAM:  Constitutional: Comfortable . No acute distress.   HEENT: Atraumatic and normocephalic , neck is supple . no JVD. No carotid bruit.  CNS: A&Ox3. No focal deficits.   Respiratory: CTAB, unlabored   Cardiovascular: RRR normal s1 s2. No murmur. No rubs or gallop.  Gastrointestinal: Soft, non-tender. +Bowel sounds.   Extremities: 2+ Peripheral Pulses, No clubbing, cyanosis, or edema  Psychiatric: Calm . no agitation.   Skin: Warm and dry, no ulcers on extremities     LABS:  ( 18 Jul 2024 15:20 )  Troponin T  X    ,  CPK  37   , CKMB  X    , BNP X                                  13.2   10.77 )-----------( 214      ( 19 Jul 2024 03:56 )             38.2     07-19    136  |  102  |  13.0  ----------------------------<  120<H>  4.6   |  22.0  |  0.76    Ca    8.2<L>      19 Jul 2024 03:56  Phos  2.4     07-19  Mg     2.5     07-19    TPro  6.1<L>  /  Alb  2.9<L>  /  TBili  0.8  /  DBili  x   /  AST  15  /  ALT  7   /  AlkPhos  60  07-19    PT/INR - ( 18 Jul 2024 15:20 )   PT: 15.0 sec;   INR: 1.36 ratio         PTT - ( 19 Jul 2024 13:05 )  PTT:107.0 sec  Urinalysis Basic - ( 19 Jul 2024 03:56 )    Color: x / Appearance: x / SG: x / pH: x  Gluc: 120 mg/dL / Ketone: x  / Bili: x / Urobili: x   Blood: x / Protein: x / Nitrite: x   Leuk Esterase: x / RBC: x / WBC x   Sq Epi: x / Non Sq Epi: x / Bacteria: x      ECG: Sinus bradycardia with lateral ischemia.   Prior ECG: Yes [ X ] No [  ]    CARDIAC TESTING   ECHO: < from: TTE W or WO Ultrasound Enhancing Agent (07.18.24 @ 19:09) >  CONCLUSIONS:      1. Technically difficult image quality.   2. Left ventricular systolic function is severely decreased with an ejection fraction of 24 % by Gudino's method of disks.   3. There is severe (grade 3) left ventricular diastolic dysfunction, with elevated left ventricular filling pressure.   4. There is increased LV mass and concentric hypertrophy.   5. Normal right ventricular cavity size and mildly reduced right ventricular systolic function.   6. The left atrium is mildly dilated.   7. Mild mitral regurgitation.   8. Mild tricuspid regurgitation.   9. Mild aortic regurgitation.  10. Moderate aortic stenosis.  11. No prior echocardiogram is available for comparison.    < end of copied text >      Cardiac Cath Risk Assessments:  ASA: 3  Mallampati: 2  Bleeding Risk: 1.5%  Creatinine: 0.76  GFR: 87    PLAN:  -Consent obtained by attending interventional cardiologist  -EKG and labs reviewed  -aspirin 81mg PO pre procedure ordered.   -IVF bolus held due to HFrEF EF <24%                                                [facility]                                              INTERVENTIONAL CARDIOLOGY AT [facility]                                                   [address], [place] [address]                                             Telephone: [phone]. Fax:[phone]                                                       INTERVENTIONAL CARDIOLOGY CONSULTATION NOTE                                                                                             History obtained by: Patient and medical record  Reason for Consultation: Evaluation for cardiac catheterization  Available pt records reviewed: Yes [ x ] No [  ]    Chief complaint:    Patient is a 89y old  Male who presents with a chief complaint of AFIB with RVR with syncope and collapse (19 Jul 2024 10:23)      HPI:  89M PMHx Afib, HFrEF BIBEMS from [place] to [facility] ED. Went to [place] after syncope/collapse. Patient was standing by the front sink when he passed out he turned-  he felt dizzy and lightheaded and turned around and fell face forward onto the ground with +LOC, head strike, associated pain in upper mouth with loose tooth. Patient hit chin, nose, has a broken tooth, bruising. At [place] was found to have a SDH and in HR of 120-150s - received lopressor and Diltiazem in ED. Transferred to [facility] for neurosurgery eval. Afib RVR, under control with metoprolol, restarted on Eliquis. Repeat CT scan obtained which is stable without evidence of acute hemorrhage, neurosurgery has no plans for surgical intervention, cleared for resumption of AC. Patient was upgraded to MICU after multiple episodes of Torsades on 7/18. During RRT, patient received IV Amio and IV lido and IV magnesium supplementation. trop 35->35. EKG revealed Sinus bradycardia with anterolateral TWI and prolonged QTC. Patient was started on IV amio infusions and IV lidocaine infusion @ 1mg/min. TTE performed revealing EF <24%; severe grade 3 LV diastolic dysfunction with elevated left filling pressure; normal RV size and mildly reduced RV systolic function; mild MR; mild TR; mild AR; moderate aortic stenosis. Patient presents to cardiac cath lab today for ischemic evaluation. Patient noted with multiple episodes of polymorphic VT lasting around 8 seconds each while in cardiac cath holding area. . Patient asymptomatic during episodes. SBP stable 115/69. Potassium today 4.6; Magnesium 2.5. He is currently on amiodarone @ 0.5mg/min; lido @ 1mg/min; and IV heparin infusion. he denies chest pain, chest pressure, shortness of breath, palpitations, dizziness. EKG reviewed Sinus bradycardia HR 56-60 lateral ischemia with prolonged QTC >600. D/W Dr. Julio leon. IV amiodarone was discontinued, IV lido increased to 2mg/min and dopamine started @ 10mcg/kg/min. Patient pending [facility]        PAST MEDICAL HISTORY  Afib        PAST SURGICAL HISTORY      HOME MEDICATIONS:  Eliquis 2.5 mg oral tablet: 1 tab(s) orally 2 times a day (16 Jul 2024 17:12)  Entresto 24 mg-26 mg oral tablet: 1 tab(s) orally once a day (16 Jul 2024 17:13)  Lasix 40 mg oral tablet: 1 tab(s) orally once a day (16 Jul 2024 17:12)  spironolactone 25 mg oral tablet: 1 tab(s) orally once a day (16 Jul 2024 17:13)      CURRENT CARDIAC MEDICATIONS:  DOPamine Infusion 10 MICROgram(s)/kG/Min IV Continuous <Continuous>  lidocaine   Infusion 2 mG/Min IV Continuous <Continuous>  sacubitril 49 mG/valsartan 51 mG 1 Tablet(s) Oral two times a day  spironolactone 25 milliGRAM(s) Oral daily      ALLERGIES:   penicillins (Unknown)      REVIEW OF SYMPTOMS:   CONSTITUTIONAL: no fever, no chills, no weight loss, no weight gain, no fatigue   CARDIOVASCULAR: denies chest pain, palpitations, dizziness, shortness of breath  RESPIRATORY: no Shortness of breath, no cough, no wheezing  : No dysuria, no hematuria   GI: No dark color stool, no nausea, no diarrhea, no constipation, no abdominal pain   NEURO: No headache, no slurred speech   ALL OTHER REVIEW OF SYSTEMS ARE NEGATIVE.    VITAL SIGNS:  T(C): 36.4 (07-19-24 @ 15:10), Max: 36.8 (07-18-24 @ 21:54)  T(F): 97.6 (07-19-24 @ 15:10), Max: 98.3 (07-18-24 @ 21:54)  HR: 59 (07-19-24 @ 15:10) (50 - 78)  BP: 115/69 (07-19-24 @ 15:10) (109/63 - 156/72)  RR: 20 (07-19-24 @ 15:10) (10 - 26)  SpO2: 99% (07-19-24 @ 15:10) (88% - 100%)    INTAKE AND OUTPUT:     07-18 @ 07:01  -  07-19 @ 07:00  --------------------------------------------------------  IN: 1018.8 mL / OUT: 710 mL / NET: 308.8 mL    07-19 @ 07:01  -  07-19 @ 16:36  --------------------------------------------------------  IN: 239.4 mL / OUT: 175 mL / NET: 64.4 mL        PHYSICAL EXAM:  Constitutional: Comfortable . No acute distress.   HEENT: Atraumatic and normocephalic , neck is supple . no JVD. No carotid bruit.  CNS: A&Ox3. No focal deficits.   Respiratory: CTAB, unlabored   Cardiovascular: RRR normal s1 s2. +systolic murmur at RSB. No rubs or gallop.  Gastrointestinal: Soft, non-tender. +Bowel sounds.   Extremities: 2+ Peripheral Pulses, No clubbing, cyanosis, or edema  Psychiatric: Calm . no agitation.   Skin: Warm and dry, no ulcers on extremities     LABS:  ( 18 Jul 2024 15:20 )  Troponin T  X    ,  CPK  37   , CKMB  X    , BNP X                                  13.2   10.77 )-----------( 214      ( 19 Jul 2024 03:56 )             38.2     07-19    136  |  102  |  13.0  ----------------------------<  120<H>  4.6   |  22.0  |  0.76    Ca    8.2<L>      19 Jul 2024 03:56  Phos  2.4     07-19  Mg     2.5     07-19    TPro  6.1<L>  /  Alb  2.9<L>  /  TBili  0.8  /  DBili  x   /  AST  15  /  ALT  7   /  AlkPhos  60  07-19    PT/INR - ( 18 Jul 2024 15:20 )   PT: 15.0 sec;   INR: 1.36 ratio         PTT - ( 19 Jul 2024 13:05 )  PTT:107.0 sec  Urinalysis Basic - ( 19 Jul 2024 03:56 )    Color: x / Appearance: x / SG: x / pH: x  Gluc: 120 mg/dL / Ketone: x  / Bili: x / Urobili: x   Blood: x / Protein: x / Nitrite: x   Leuk Esterase: x / RBC: x / WBC x   Sq Epi: x / Non Sq Epi: x / Bacteria: x      ECG: Sinus bradycardia with lateral ischemia.   Prior ECG: Yes [ X ] No [  ]    CARDIAC TESTING   ECHO: < from: TTE W or WO Ultrasound Enhancing Agent (07.18.24 @ 19:09) >  CONCLUSIONS:      1. Technically difficult image quality.   2. Left ventricular systolic function is severely decreased with an ejection fraction of 24 % by Gudino's method of disks.   3. There is severe (grade 3) left ventricular diastolic dysfunction, with elevated left ventricular filling pressure.   4. There is increased LV mass and concentric hypertrophy.   5. Normal right ventricular cavity size and mildly reduced right ventricular systolic function.   6. The left atrium is mildly dilated.   7. Mild mitral regurgitation.   8. Mild tricuspid regurgitation.   9. Mild aortic regurgitation.  10. Moderate aortic stenosis.  11. No prior echocardiogram is available for comparison.    < end of copied text >      Cardiac Cath Risk Assessments:  ASA: 3  Mallampati: 2  Bleeding Risk: 1.5%  Creatinine: 0.76  GFR: 87    PLAN:  -Consent obtained by attending interventional cardiologist  -EKG and labs reviewed  -aspirin 81mg PO pre procedure ordered.   -IVF bolus held due to HFrEF EF <24%

## 2024-07-19 NOTE — PROGRESS NOTE ADULT - ATTENDING COMMENTS
patient admitted to MICU for NSVT started on lidocaine and amio drip, pending [facility]    prior to [facility] 7/19, patient develops NSVT, increased lido to 2 mg/hr, however went to bradyardia  ,  amiodarone dc, started on dopamine at 10.  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    started on aspirin and plavix.     appreciate interventional card rec, will follow  will continue lido at 2 at this time, will continue to wean base on symptoms, may need oral antiarrythmic  patient protecting airway, not on pressor  will remain in MICU   rest of rec per resident

## 2024-07-19 NOTE — PROGRESS NOTE ADULT - SUBJECTIVE AND OBJECTIVE BOX
89M PMHx Afib, HFrEF BIBEMS from [place] to [facility] ED. Went to [place] after syncope/collapse. Patient was standing by the front sink when he passed out he turned-  he felt dizzy and lightheaded and turned around and fell face forward onto the ground with +LOC, head strike, associated pain in upper mouth with loose tooth. Patient hit chin, nose, has a broken tooth, bruising. At [place] was found to have a SDH and in HR of 120-150s - received lopressor and Diltiazem in ED. Transferred to [facility] for neurosurgery eval. Afib RVR, under control with metoprolol, restarted on Eliquis. Repeat CT scan obtained which is stable without evidence of acute hemorrhage, neurosurgery has no plans for surgical intervention, cleared for resumption of AC.       Patient seen and examined at bedside. Patient has no acute complaints at this time. Spoke with patient regarding his upcoming LHC. Spoke with patient and patient's daughter on speaker phone, patient and daughter both agree to procedure. Both agree to ICD after medications have been transitioned to P.O.   INTERVAL HPI/OVERNIGHT EVENTS:   No events overnight  [facility] scheduled for 1600 today.   Afebrile, hemodynamically stable     ICU Vital Signs Last 24 Hrs  T(C): 36.6 (19 Jul 2024 07:12), Max: 37.1 (18 Jul 2024 13:32)  T(F): 97.8 (19 Jul 2024 07:12), Max: 98.8 (18 Jul 2024 13:32)  HR: 58 (19 Jul 2024 10:00) (50 - 78)  BP: 130/58 (19 Jul 2024 10:00) (116/84 - 156/72)  BP(mean): 79 (19 Jul 2024 10:00) (71 - 111)  ABP: --  ABP(mean): --  RR: 18 (19 Jul 2024 10:00) (10 - 26)  SpO2: 99% (19 Jul 2024 10:00) (88% - 100%)    O2 Parameters below as of 19 Jul 2024 08:00  Patient On (Oxygen Delivery Method): room air          I&O's Summary    18 Jul 2024 07:01  -  19 Jul 2024 07:00  --------------------------------------------------------  IN: 1018.8 mL / OUT: 710 mL / NET: 308.8 mL          LABS:                        13.2   10.77 )-----------( 214      ( 19 Jul 2024 03:56 )             38.2     07-19    136  |  102  |  13.0  ----------------------------<  120<H>  4.6   |  22.0  |  0.76    Ca    8.2<L>      19 Jul 2024 03:56  Phos  2.4     07-19  Mg     2.5     07-19    TPro  6.1<L>  /  Alb  2.9<L>  /  TBili  0.8  /  DBili  x   /  AST  15  /  ALT  7   /  AlkPhos  60  07-19    PT/INR - ( 18 Jul 2024 15:20 )   PT: 15.0 sec;   INR: 1.36 ratio         PTT - ( 19 Jul 2024 06:00 )  PTT:106.3 sec  Urinalysis Basic - ( 19 Jul 2024 03:56 )    Color: x / Appearance: x / SG: x / pH: x  Gluc: 120 mg/dL / Ketone: x  / Bili: x / Urobili: x   Blood: x / Protein: x / Nitrite: x   Leuk Esterase: x / RBC: x / WBC x   Sq Epi: x / Non Sq Epi: x / Bacteria: x      CAPILLARY BLOOD GLUCOSE      POCT Blood Glucose.: 121 mg/dL (18 Jul 2024 12:15)        RADIOLOGY & ADDITIONAL TESTS:    Consultant(s) Notes Reviewed:  [x ] YES  [ ] NO    MEDICATIONS  (STANDING):  aMIOdarone Infusion 0.501 mG/Min (16.7 mL/Hr) IV Continuous <Continuous>  cefTRIAXone Injectable. 1000 milliGRAM(s) IV Push every 24 hours  chlorhexidine 2% Cloths 1 Application(s) Topical daily  heparin  Infusion.  Unit(s)/Hr (11 mL/Hr) IV Continuous <Continuous>  lidocaine   Infusion 1 mG/Min (7.5 mL/Hr) IV Continuous <Continuous>  metoprolol tartrate 25 milliGRAM(s) Oral every 6 hours  pantoprazole    Tablet 40 milliGRAM(s) Oral before breakfast  sacubitril 49 mG/valsartan 51 mG 1 Tablet(s) Oral two times a day  spironolactone 25 milliGRAM(s) Oral daily    MEDICATIONS  (PRN):  heparin   Injectable 5000 Unit(s) IV Push every 6 hours PRN For aPTT less than 40  heparin   Injectable 2500 Unit(s) IV Push every 6 hours PRN For aPTT between 40 - 57      PHYSICAL EXAM:  GENERAL: PAtient lying comforatbly on stretcher  HEAD:  Atraumatic, Normocephalic  EYES: EOMI, PERRLA, conjunctiva and sclera clear  NECK: Supple, No JVD, Normal thyroid, no enlarged nodes  NERVOUS SYSTEM:  Alert & Awake. Oriented x2  CHEST/LUNG: B/L good air entry; No rales, rhonchi, or wheezing  HEART: S1S2 normal, no S3, Regular rate and rhythm; No murmurs  ABDOMEN: Soft, Nontender, Nondistended; Bowel sounds present  EXTREMITIES:  2+ Peripheral Pulses, No clubbing, cyanosis, or edema  LYMPH: No lymphadenopathy noted  SKIN: No rashes or lesions    Care Discussed with Consultants/Other Providers [ x] YES  [ ] NO

## 2024-07-19 NOTE — CONSULT NOTE ADULT - REASON FOR ADMISSION
AFIB with RVR with syncope and collapse
Syncope

## 2024-07-19 NOTE — DIETITIAN INITIAL EVALUATION ADULT - ORAL INTAKE PTA/DIET HISTORY
Pt s/p Rapid response, currently sleeping on 1:1. NPO status maintained; unable to interview at this time.

## 2024-07-19 NOTE — DIETITIAN INITIAL EVALUATION ADULT - OTHER INFO
Pt is a 89 year old M PMHx Afib, HFrEFand systolic cardiomyopathy transferred from [place] initially for evaluation of SDH, but today had multiple episodes of Torsades w/1st episode lasting 19sec.  S/P Rapid response; pt transferred to MICU; NSVT, Anterior-lateral ischemia,  Afib RVR, Severe HFrEF

## 2024-07-19 NOTE — CHART NOTE - NSCHARTNOTEFT_GEN_A_CORE
Patient was transferred to cardiac cath lab for elective LHC. Patient noted with multiple episodes of polymorphic VT lasting around 8 seconds each. Patient asymptomatic during episodes. SBP stable 115/69. Potassium today 4.6; Magnesium 2.5. He is currently on amiodarone @ 0.5mg/min; lido @ 1mg/min; and IV heparin infusion. he denies chest pain, chest pressure, shortness of breath, palpitations, dizziness. EKG reviewed Sinus bradycardia HR 56-60 lateral ischemia with prolonged QTC >600.    PLAN:  -Discussed with EP attending Dr. Miller  -pending [facility] for ischemic evaluation  -Sinus bradycardia HR 55-60 and very prolonged QTC on EKG. STOP IV Amio  -Increase lidocaine to 2mg/min  -Start IV dopamine @ 10mcg/kg/min to to increase HR and shorten QT interval. Goal HR >70  -If dopamine infusion does not increase HR, may require temporary venous pacing wire.   -Keep K>4; Keep Mg >2  -All above discussed with ICU attending and Dr Altamirano Patient was transferred to cardiac cath lab for elective LHC. Patient noted with multiple episodes of polymorphic VT lasting around 8 seconds each. Patient asymptomatic during episodes. SBP stable 115/69. Potassium today 4.6; Magnesium 2.5. He is currently on amiodarone @ 0.5mg/min; lido @ 1mg/min; and IV heparin infusion. he denies chest pain, chest pressure, shortness of breath, palpitations, dizziness. EKG reviewed Sinus bradycardia HR 56-60 lateral ischemia with prolonged QTC >600.    PLAN:  -Discussed with EP attending Dr. Miller  -pending [facility] for ischemic evaluation  -Sinus bradycardia HR 55-60 and very prolonged QTC on EKG. STOP IV Amio  -Increase lidocaine to 2mg/min  -Start IV dopamine @ 10mcg/kg/min to to increase HR and shorten QT interval. Goal HR 80-100bpm  -If dopamine infusion does not increase HR, may require temporary venous pacing wire.   -Keep K>4; Keep Mg >2  -All above discussed with ICU attending and Dr Altamirano

## 2024-07-20 LAB
ALBUMIN SERPL ELPH-MCNC: 2.6 G/DL — LOW (ref 3.3–5.2)
ALP SERPL-CCNC: 55 U/L — SIGNIFICANT CHANGE UP (ref 40–120)
ALT FLD-CCNC: 5 U/L — SIGNIFICANT CHANGE UP
ANION GAP SERPL CALC-SCNC: 12 MMOL/L — SIGNIFICANT CHANGE UP (ref 5–17)
ANION GAP SERPL CALC-SCNC: 16 MMOL/L — SIGNIFICANT CHANGE UP (ref 5–17)
APTT BLD: 70.5 SEC — HIGH (ref 24.5–35.6)
APTT BLD: 75.4 SEC — HIGH (ref 24.5–35.6)
AST SERPL-CCNC: 12 U/L — SIGNIFICANT CHANGE UP
BILIRUB SERPL-MCNC: 0.6 MG/DL — SIGNIFICANT CHANGE UP (ref 0.4–2)
BUN SERPL-MCNC: 11.9 MG/DL — SIGNIFICANT CHANGE UP (ref 8–20)
BUN SERPL-MCNC: 14.5 MG/DL — SIGNIFICANT CHANGE UP (ref 8–20)
CALCIUM SERPL-MCNC: 7.4 MG/DL — LOW (ref 8.4–10.5)
CALCIUM SERPL-MCNC: 8.7 MG/DL — SIGNIFICANT CHANGE UP (ref 8.4–10.5)
CHLORIDE SERPL-SCNC: 91 MMOL/L — LOW (ref 96–108)
CHLORIDE SERPL-SCNC: 96 MMOL/L — SIGNIFICANT CHANGE UP (ref 96–108)
CO2 SERPL-SCNC: 19 MMOL/L — LOW (ref 22–29)
CO2 SERPL-SCNC: 21 MMOL/L — LOW (ref 22–29)
CREAT SERPL-MCNC: 0.89 MG/DL — SIGNIFICANT CHANGE UP (ref 0.5–1.3)
CREAT SERPL-MCNC: 0.94 MG/DL — SIGNIFICANT CHANGE UP (ref 0.5–1.3)
EGFR: 78 ML/MIN/1.73M2 — SIGNIFICANT CHANGE UP
EGFR: 83 ML/MIN/1.73M2 — SIGNIFICANT CHANGE UP
GLUCOSE BLDC GLUCOMTR-MCNC: 156 MG/DL — HIGH (ref 70–99)
GLUCOSE SERPL-MCNC: 172 MG/DL — HIGH (ref 70–99)
GLUCOSE SERPL-MCNC: 505 MG/DL — CRITICAL HIGH (ref 70–99)
HCT VFR BLD CALC: 36.5 % — LOW (ref 39–50)
HCT VFR BLD CALC: 37 % — LOW (ref 39–50)
HGB BLD-MCNC: 12.4 G/DL — LOW (ref 13–17)
HGB BLD-MCNC: 12.7 G/DL — LOW (ref 13–17)
MAGNESIUM SERPL-MCNC: 2 MG/DL — SIGNIFICANT CHANGE UP (ref 1.6–2.6)
MAGNESIUM SERPL-MCNC: 2.1 MG/DL — SIGNIFICANT CHANGE UP (ref 1.6–2.6)
MCHC RBC-ENTMCNC: 29.5 PG — SIGNIFICANT CHANGE UP (ref 27–34)
MCHC RBC-ENTMCNC: 29.6 PG — SIGNIFICANT CHANGE UP (ref 27–34)
MCHC RBC-ENTMCNC: 34 GM/DL — SIGNIFICANT CHANGE UP (ref 32–36)
MCHC RBC-ENTMCNC: 34.3 GM/DL — SIGNIFICANT CHANGE UP (ref 32–36)
MCV RBC AUTO: 85.8 FL — SIGNIFICANT CHANGE UP (ref 80–100)
MCV RBC AUTO: 87.1 FL — SIGNIFICANT CHANGE UP (ref 80–100)
PHOSPHATE SERPL-MCNC: 3.2 MG/DL — SIGNIFICANT CHANGE UP (ref 2.4–4.7)
PHOSPHATE SERPL-MCNC: 3.3 MG/DL — SIGNIFICANT CHANGE UP (ref 2.4–4.7)
PLATELET # BLD AUTO: 187 K/UL — SIGNIFICANT CHANGE UP (ref 150–400)
PLATELET # BLD AUTO: 200 K/UL — SIGNIFICANT CHANGE UP (ref 150–400)
POTASSIUM SERPL-MCNC: 4 MMOL/L — SIGNIFICANT CHANGE UP (ref 3.5–5.3)
POTASSIUM SERPL-MCNC: 4.3 MMOL/L — SIGNIFICANT CHANGE UP (ref 3.5–5.3)
POTASSIUM SERPL-SCNC: 4 MMOL/L — SIGNIFICANT CHANGE UP (ref 3.5–5.3)
POTASSIUM SERPL-SCNC: 4.3 MMOL/L — SIGNIFICANT CHANGE UP (ref 3.5–5.3)
PROT SERPL-MCNC: 5.6 G/DL — LOW (ref 6.6–8.7)
RBC # BLD: 4.19 M/UL — LOW (ref 4.2–5.8)
RBC # BLD: 4.31 M/UL — SIGNIFICANT CHANGE UP (ref 4.2–5.8)
RBC # FLD: 14.9 % — HIGH (ref 10.3–14.5)
RBC # FLD: 15.1 % — HIGH (ref 10.3–14.5)
SODIUM SERPL-SCNC: 122 MMOL/L — LOW (ref 135–145)
SODIUM SERPL-SCNC: 133 MMOL/L — LOW (ref 135–145)
WBC # BLD: 8.53 K/UL — SIGNIFICANT CHANGE UP (ref 3.8–10.5)
WBC # BLD: 8.72 K/UL — SIGNIFICANT CHANGE UP (ref 3.8–10.5)
WBC # FLD AUTO: 8.53 K/UL — SIGNIFICANT CHANGE UP (ref 3.8–10.5)
WBC # FLD AUTO: 8.72 K/UL — SIGNIFICANT CHANGE UP (ref 3.8–10.5)

## 2024-07-20 PROCEDURE — 93010 ELECTROCARDIOGRAM REPORT: CPT

## 2024-07-20 RX ORDER — HEPARIN SODIUM 1000 [USP'U]/ML
900 INJECTION, SOLUTION INTRAVENOUS; SUBCUTANEOUS
Qty: 25000 | Refills: 0 | Status: DISCONTINUED | OUTPATIENT
Start: 2024-07-20 | End: 2024-07-23

## 2024-07-20 RX ORDER — BUPIVACAINE HCL/0.9 % NACL/PF 0.25 %
0.05 PLASTIC BAG, INJECTION (ML) EPIDURAL
Qty: 8 | Refills: 0 | Status: DISCONTINUED | OUTPATIENT
Start: 2024-07-20 | End: 2024-07-20

## 2024-07-20 RX ORDER — HEPARIN SODIUM 1000 [USP'U]/ML
INJECTION, SOLUTION INTRAVENOUS; SUBCUTANEOUS
Qty: 25000 | Refills: 0 | Status: DISCONTINUED | OUTPATIENT
Start: 2024-07-20 | End: 2024-07-20

## 2024-07-20 RX ORDER — HEPARIN SODIUM 1000 [USP'U]/ML
2500 INJECTION, SOLUTION INTRAVENOUS; SUBCUTANEOUS EVERY 6 HOURS
Refills: 0 | Status: DISCONTINUED | OUTPATIENT
Start: 2024-07-20 | End: 2024-07-23

## 2024-07-20 RX ORDER — HEPARIN SODIUM 1000 [USP'U]/ML
5000 INJECTION, SOLUTION INTRAVENOUS; SUBCUTANEOUS EVERY 6 HOURS
Refills: 0 | Status: DISCONTINUED | OUTPATIENT
Start: 2024-07-20 | End: 2024-07-23

## 2024-07-20 RX ORDER — HEPARIN SODIUM 1000 [USP'U]/ML
5000 INJECTION, SOLUTION INTRAVENOUS; SUBCUTANEOUS ONCE
Refills: 0 | Status: COMPLETED | OUTPATIENT
Start: 2024-07-20 | End: 2024-07-20

## 2024-07-20 RX ADMIN — HEPARIN SODIUM 9 UNIT(S)/HR: 1000 INJECTION, SOLUTION INTRAVENOUS; SUBCUTANEOUS at 08:16

## 2024-07-20 RX ADMIN — ATORVASTATIN CALCIUM 40 MILLIGRAM(S): 40 TABLET, FILM COATED ORAL at 21:15

## 2024-07-20 RX ADMIN — CHLORHEXIDINE GLUCONATE 1 APPLICATION(S): 500 CLOTH TOPICAL at 12:21

## 2024-07-20 RX ADMIN — Medication 81 MILLIGRAM(S): at 12:11

## 2024-07-20 RX ADMIN — Medication 0.4 MILLIGRAM(S): at 21:15

## 2024-07-20 RX ADMIN — SPIRONOLACTONE 25 MILLIGRAM(S): 50 TABLET, FILM COATED ORAL at 05:15

## 2024-07-20 RX ADMIN — Medication 6.01 MICROGRAM(S)/KG/MIN: at 03:46

## 2024-07-20 RX ADMIN — CLOPIDOGREL BISULFATE 75 MILLIGRAM(S): 75 TABLET, FILM COATED ORAL at 12:12

## 2024-07-20 RX ADMIN — Medication 7.5 MG/MIN: at 14:12

## 2024-07-20 RX ADMIN — PANTOPRAZOLE SODIUM 40 MILLIGRAM(S): 20 TABLET, DELAYED RELEASE ORAL at 05:15

## 2024-07-20 RX ADMIN — Medication 1000 MILLIGRAM(S): at 17:23

## 2024-07-20 NOTE — PROGRESS NOTE ADULT - SUBJECTIVE AND OBJECTIVE BOX
Patient is a 89y old  Male who presents with a chief complaint of AFIB with RVR with syncope and collapse (19 Jul 2024 16:31)    BRIEF HOSPITAL COURSE:   89M PMHx Afib, HFrEF, with multiple episodes of fall this month had another episode of fall with syncope initially presented to [facility] with concerns of head bleed transferred to [facility] on 7/15 admitted to medicine after being seen by neurosurgery and trauma surgery where he was found to have bilateral 6 mm subdural hygroma/hematoma which remained stable on subsequent to CT head and was cleared  from trauma surgery as well as neurosurgery admitted to medicine for evaluation management of syncope, as well as A-fib with RVR. Course c/b Torsade with syncope and spontaneous resolution but subsequent multiple episode of NSVT. Admitted to MICU with recurrent ventricular tachycardia, Acute on Chronic HFrEF, bilateral subdural hygroma/hematoma. S/p LHC x2 stent to LAD for 80% stenosis, x1 sent to [address] [place] [address] with 95% stenosis.      Events last 24 hours:   -Bradycardia improving on Dopamine Infusion, HR now mid 80-90s. No further arrhythmias on Lidocaine Infusion since increased to 2g. Currently Asx.   -Satting well on RA. Afebrile.     PAST MEDICAL & SURGICAL HISTORY:  Afib    Review of Systems:  CONSTITUTIONAL: No fever, chills, or fatigue  EYES: No eye pain, visual disturbances, or discharge  ENMT:  No difficulty hearing, tinnitus, vertigo; No sinus or throat pain  NECK: No pain or stiffness  RESPIRATORY: No cough, wheezing, chills or hemoptysis; No shortness of breath  CARDIOVASCULAR: No chest pain, palpitations, dizziness, or leg swelling  GASTROINTESTINAL: No abdominal or epigastric pain. No nausea, vomiting, or hematemesis; No diarrhea or constipation. No melena or hematochezia.  GENITOURINARY: No dysuria, frequency, hematuria, or incontinence  NEUROLOGICAL: No headaches, memory loss, loss of strength, numbness, or tremors  SKIN: No itching, burning, rashes, or lesions   MUSCULOSKELETAL: No joint pain or swelling; No muscle, back, or extremity pain  PSYCHIATRIC: No depression, anxiety, mood swings, or difficulty sleeping    Medications:  cefTRIAXone Injectable. 1000 milliGRAM(s) IV Push every 24 hours  DOPamine Infusion 4.992 MICROgram(s)/kG/Min IV Continuous <Continuous>  lidocaine   Infusion 2 mG/Min IV Continuous <Continuous>  spironolactone 25 milliGRAM(s) Oral daily  heparin   Injectable 5000 Unit(s) IV Push every 6 hours PRN  heparin   Injectable 2500 Unit(s) IV Push every 6 hours PRN  heparin  Infusion.  Unit(s)/Hr IV Continuous <Continuous>  pantoprazole    Tablet 40 milliGRAM(s) Oral before breakfast  tamsulosin 0.4 milliGRAM(s) Oral at bedtime  atorvastatin 40 milliGRAM(s) Oral at bedtime  chlorhexidine 2% Cloths 1 Application(s) Topical daily    ICU Vital Signs Last 24 Hrs  T(C): 36.6 (19 Jul 2024 20:18), Max: 36.7 (19 Jul 2024 00:23)  T(F): 97.8 (19 Jul 2024 20:18), Max: 98.1 (19 Jul 2024 00:23)  HR: 82 (19 Jul 2024 23:00) (50 - 112)  BP: 127/67 (19 Jul 2024 23:00) (102/75 - 154/137)  BP(mean): 86 (19 Jul 2024 23:00) (71 - 145)  ABP: --  ABP(mean): --  RR: 20 (19 Jul 2024 23:00) (10 - 32)  SpO2: 98% (19 Jul 2024 23:00) (89% - 100%)  O2 Parameters below as of 19 Jul 2024 20:00  Patient On (Oxygen Delivery Method): room air    I&O's Detail  18 Jul 2024 07:01  -  19 Jul 2024 07:00  --------------------------------------------------------  IN:    Amiodarone: 33.3 mL    Amiodarone: 250.5 mL    Heparin Infusion: 165 mL    IV PiggyBack: 50 mL    IV PiggyBack: 300 mL    Lidocaine: 120 mL    Oral Fluid: 100 mL  Total IN: 1018.8 mL  OUT:    Indwelling Catheter - Urethral (mL): 710 mL  Total OUT: 710 mL  Total NET: 308.8 mL    19 Jul 2024 07:01  -  19 Jul 2024 23:33  --------------------------------------------------------  IN:    Amiodarone: 116.9 mL    DOPamine Infusion: 48 mL    Heparin Infusion: 70 mL    Lidocaine: 52.5 mL    Lidocaine: 60 mL  Total IN: 347.4 mL  OUT:    Indwelling Catheter - Urethral (mL): 175 mL    Voided (mL): 20 mL  Total OUT: 195 mL  Total NET: 152.4 mL    LABS:                     15.5   12.54 )-----------( 236      ( 19 Jul 2024 18:30 )             45.7     07-19  132<L>  |  95<L>  |  10.6  ----------------------------<  139<H>  4.6   |  20.0<L>  |  0.81  Ca    8.8      19 Jul 2024 18:30  Phos  2.7     07-19  Mg     2.4     07-19  TPro  6.9  /  Alb  3.0<L>  /  TBili  1.0  /  DBili  x   /  AST  17  /  ALT  7   /  AlkPhos  72  07-19  CARDIAC MARKERS ( 18 Jul 2024 15:20 )  x     / x     / 37 U/L / x     / x        CAPILLARY BLOOD GLUCOSE  POCT Blood Glucose.: 159 mg/dL (19 Jul 2024 01:33)    PT/INR - ( 18 Jul 2024 15:20 )   PT: 15.0 sec;   INR: 1.36 ratio    PTT - ( 19 Jul 2024 13:05 )  PTT:107.0 sec    Urinalysis Basic - ( 19 Jul 2024 18:30 )  Color: x / Appearance: x / SG: x / pH: x  Gluc: 139 mg/dL / Ketone: x  / Bili: x / Urobili: x   Blood: x / Protein: x / Nitrite: x   Leuk Esterase: x / RBC: x / WBC x   Sq Epi: x / Non Sq Epi: x / Bacteria: x    CULTURES:  C Diff by PCR Result: NotDetelele (07-18-24 @ 12:44)  Culture Results:   No growth at 48 Hours (07-16-24 @ 20:38)  Culture Results:   >100,000 CFU/ml Coag Negative Staphylococcus "Susceptibilities not  performed" (07-16-24 @ 01:30)    Physical Examination:  General: Chronically ill-appearing adult male.   HEENT: PERRL.  NECK: Supple.   PULM: Clear to auscultation bilaterally.  CVS: s1/s2.  ABD: Soft, nondistended, nontender, normoactive bowel sounds.  EXT: No edema, nontender.  SKIN: Warm.    RADIOLOGY:   < from: CT Head No Cont (07.19.24 @ 00:01) >  ACC: [id number] EXAM:  CT BRAIN   ORDERED BY: HOWARD NUÑEZ   PROCEDURE DATE:  07/19/2024    INTERPRETATION:  CLINICAL INFORMATION: SDH  COMPARISON: Head CT July 16, 2024.  Multiple axial sections were performed from the base skull to vertex   without contrast enhancement. Coronal and significant performed  Parenchymal volume loss and chronic white facet changes again seen.  Extra-axial fluid collections are again seen involving bilateral   supratentorial and posterior fossa region. These findings appear stable   right exam and could be compatible with areas of increased atrophy versus   chronic subdural hematoma/hygroma  Evaluation of the osseous structures with the appropriate window appears   unremarkable  The visualized paranasal mastoid and middle ear regions appear clear.  IMPRESSION: Stable exam.    CRITICAL CARE TIME SPENT:  36 minutes of critical care time spent providing medical care for patient's acute illness/conditions that impairs at least one vital organ system and/or poses a high risk of imminent or life threatening deterioration in the patient's condition. It includes time spent evaluating and treating the patient's acute illness as well as time spent reviewing labs, radiology, discussing goals of care with patient and/or patient's family, and discussing the case with a multidisciplinary team, in an effort to prevent further life threatening deterioration or end organ damage. This time is independent of any procedures performed.    Date of entry of this note is equal to the date of services rendered.  Patient is a 89y old  Male who presents with a chief complaint of AFIB with RVR with syncope and collapse (19 Jul 2024 16:31)    BRIEF HOSPITAL COURSE:   89M PMHx Afib, HFrEF, with multiple episodes of fall this month had another episode of fall with syncope initially presented to [facility] with concerns of head bleed transferred to [facility] on 7/15 admitted to medicine after being seen by neurosurgery and trauma surgery where he was found to have bilateral 6 mm subdural hygroma/hematoma which remained stable on subsequent to CT head and was cleared  from trauma surgery as well as neurosurgery admitted to medicine for evaluation management of syncope, as well as A-fib with RVR. Course c/b Torsade with syncope and spontaneous resolution but subsequent multiple episode of NSVT. Admitted to MICU with recurrent ventricular tachycardia, Acute on Chronic HFrEF, bilateral subdural hygroma/hematoma. S/p LHC x2 stent to LAD for 80% stenosis, x1 stent to 1st Diag with 95% stenosis.      Events last 24 hours:   -Bradycardia improving on Dopamine Infusion, HR now mid 80-90s. No further arrhythmias on Lidocaine Infusion since increased to 2g. Currently Asx.   -Satting well on RA. Afebrile.     PAST MEDICAL & SURGICAL HISTORY:  Afib    Review of Systems:  CONSTITUTIONAL: No fever, chills, or fatigue  EYES: No eye pain, visual disturbances, or discharge  ENMT:  No difficulty hearing, tinnitus, vertigo; No sinus or throat pain  NECK: No pain or stiffness  RESPIRATORY: No cough, wheezing, chills or hemoptysis; No shortness of breath  CARDIOVASCULAR: No chest pain, palpitations, dizziness, or leg swelling  GASTROINTESTINAL: No abdominal or epigastric pain. No nausea, vomiting, or hematemesis; No diarrhea or constipation. No melena or hematochezia.  GENITOURINARY: No dysuria, frequency, hematuria, or incontinence  NEUROLOGICAL: No headaches, memory loss, loss of strength, numbness, or tremors  SKIN: No itching, burning, rashes, or lesions   MUSCULOSKELETAL: No joint pain or swelling; No muscle, back, or extremity pain  PSYCHIATRIC: No depression, anxiety, mood swings, or difficulty sleeping    Medications:  cefTRIAXone Injectable. 1000 milliGRAM(s) IV Push every 24 hours  DOPamine Infusion 4.992 MICROgram(s)/kG/Min IV Continuous <Continuous>  lidocaine   Infusion 2 mG/Min IV Continuous <Continuous>  spironolactone 25 milliGRAM(s) Oral daily  heparin   Injectable 5000 Unit(s) IV Push every 6 hours PRN  heparin   Injectable 2500 Unit(s) IV Push every 6 hours PRN  heparin  Infusion.  Unit(s)/Hr IV Continuous <Continuous>  pantoprazole    Tablet 40 milliGRAM(s) Oral before breakfast  tamsulosin 0.4 milliGRAM(s) Oral at bedtime  atorvastatin 40 milliGRAM(s) Oral at bedtime  chlorhexidine 2% Cloths 1 Application(s) Topical daily    ICU Vital Signs Last 24 Hrs  T(C): 36.6 (19 Jul 2024 20:18), Max: 36.7 (19 Jul 2024 00:23)  T(F): 97.8 (19 Jul 2024 20:18), Max: 98.1 (19 Jul 2024 00:23)  HR: 82 (19 Jul 2024 23:00) (50 - 112)  BP: 127/67 (19 Jul 2024 23:00) (102/75 - 154/137)  BP(mean): 86 (19 Jul 2024 23:00) (71 - 145)  ABP: --  ABP(mean): --  RR: 20 (19 Jul 2024 23:00) (10 - 32)  SpO2: 98% (19 Jul 2024 23:00) (89% - 100%)  O2 Parameters below as of 19 Jul 2024 20:00  Patient On (Oxygen Delivery Method): room air    I&O's Detail  18 Jul 2024 07:01  -  19 Jul 2024 07:00  --------------------------------------------------------  IN:    Amiodarone: 33.3 mL    Amiodarone: 250.5 mL    Heparin Infusion: 165 mL    IV PiggyBack: 50 mL    IV PiggyBack: 300 mL    Lidocaine: 120 mL    Oral Fluid: 100 mL  Total IN: 1018.8 mL  OUT:    Indwelling Catheter - Urethral (mL): 710 mL  Total OUT: 710 mL  Total NET: 308.8 mL    19 Jul 2024 07:01  -  19 Jul 2024 23:33  --------------------------------------------------------  IN:    Amiodarone: 116.9 mL    DOPamine Infusion: 48 mL    Heparin Infusion: 70 mL    Lidocaine: 52.5 mL    Lidocaine: 60 mL  Total IN: 347.4 mL  OUT:    Indwelling Catheter - Urethral (mL): 175 mL    Voided (mL): 20 mL  Total OUT: 195 mL  Total NET: 152.4 mL    LABS:                     15.5   12.54 )-----------( 236      ( 19 Jul 2024 18:30 )             45.7     07-19  132<L>  |  95<L>  |  10.6  ----------------------------<  139<H>  4.6   |  20.0<L>  |  0.81  Ca    8.8      19 Jul 2024 18:30  Phos  2.7     07-19  Mg     2.4     07-19  TPro  6.9  /  Alb  3.0<L>  /  TBili  1.0  /  DBili  x   /  AST  17  /  ALT  7   /  AlkPhos  72  07-19  CARDIAC MARKERS ( 18 Jul 2024 15:20 )  x     / x     / 37 U/L / x     / x        CAPILLARY BLOOD GLUCOSE  POCT Blood Glucose.: 159 mg/dL (19 Jul 2024 01:33)    PT/INR - ( 18 Jul 2024 15:20 )   PT: 15.0 sec;   INR: 1.36 ratio    PTT - ( 19 Jul 2024 13:05 )  PTT:107.0 sec    Urinalysis Basic - ( 19 Jul 2024 18:30 )  Color: x / Appearance: x / SG: x / pH: x  Gluc: 139 mg/dL / Ketone: x  / Bili: x / Urobili: x   Blood: x / Protein: x / Nitrite: x   Leuk Esterase: x / RBC: x / WBC x   Sq Epi: x / Non Sq Epi: x / Bacteria: x    CULTURES:  C Diff by PCR Result: NotDetelele (07-18-24 @ 12:44)  Culture Results:   No growth at 48 Hours (07-16-24 @ 20:38)  Culture Results:   >100,000 CFU/ml Coag Negative Staphylococcus "Susceptibilities not  performed" (07-16-24 @ 01:30)    Physical Examination:  General: Chronically ill-appearing adult male.   HEENT: PERRL.  NECK: Supple.   PULM: Clear to auscultation bilaterally.  CVS: s1/s2.  ABD: Soft, nondistended, nontender, normoactive bowel sounds.  EXT: No edema, nontender.  SKIN: Warm.    RADIOLOGY:   < from: CT Head No Cont (07.19.24 @ 00:01) >  ACC: [id number] EXAM:  CT BRAIN   ORDERED BY: HOWARD NUÑEZ   PROCEDURE DATE:  07/19/2024    INTERPRETATION:  CLINICAL INFORMATION: SDH  COMPARISON: Head CT July 16, 2024.  Multiple axial sections were performed from the base skull to vertex   without contrast enhancement. Coronal and significant performed  Parenchymal volume loss and chronic white facet changes again seen.  Extra-axial fluid collections are again seen involving bilateral   supratentorial and posterior fossa region. These findings appear stable   right exam and could be compatible with areas of increased atrophy versus   chronic subdural hematoma/hygroma  Evaluation of the osseous structures with the appropriate window appears   unremarkable  The visualized paranasal mastoid and middle ear regions appear clear.  IMPRESSION: Stable exam.    CRITICAL CARE TIME SPENT:  36 minutes of critical care time spent providing medical care for patient's acute illness/conditions that impairs at least one vital organ system and/or poses a high risk of imminent or life threatening deterioration in the patient's condition. It includes time spent evaluating and treating the patient's acute illness as well as time spent reviewing labs, radiology, discussing goals of care with patient and/or patient's family, and discussing the case with a multidisciplinary team, in an effort to prevent further life threatening deterioration or end organ damage. This time is independent of any procedures performed.    Date of entry of this note is equal to the date of services rendered.

## 2024-07-20 NOTE — PROGRESS NOTE ADULT - ASSESSMENT
89M PMHx Afib, HFrEF, with multiple episodes of fall this month had another episode of fall with syncope. Admitted to MICU with:  Assessment:  1. Torsades / NSVT  2. Acute on Chronic HFrEF  3. Bradycardia  4. Bilateral Subdural Hygroma/Hematoma     Plan:  NEURO:   -Monitor mental status closely, avoid neurosuppresants.   -Serial neurologic assessments.     CV:   -Bradycardia improving on Dopamine Infusion, HR now mid 80-90s. No further arrhythmias on Lidocaine Infusion since increased to 2g. Will attempt to wean Lidocaine requirements this evening as able based on symptomatology.   -Maintain goal MAP >65.   -Keep K~4 and Mg>2 for optimal arrhythmia suppression.  -Statin. Spironolactone QD.    -S/p C x2 stent to LAD for 80% stenosis, x1 sent to 1st Diag with 95% stenosis. Cards/EP following.     PULM:   -Satting well on RA, will utilize supplemental O2 PRN to maintain goal SpO2 >88%.   -Incentive spirometry, Chest PT/Pulmonary toilet, HOB >30'.     GI:   -NPO.   -Protonix QD.    RENAL:   -Renal function currently WNL.  -trend lytes/Scr daily with BMP  -I's and O's, goal UOP 0.5 cc/kg/hr  -renal dose meds and avoid nephrotoxins     ENDO:   -Aggressive glycemic control to limit FS glucose to <180mg/dl.     ID:   -Afebrile. Cx NGTD thus far. Empiric Rocephin course.   -ABX use and/or discontinuation based on discussion with ID in conjunction with clinical features, culture data, and judicious procalcitonin monitoring.    HEME:   -Heparin Infusion.     Case and plan discussed with MICU attending, Dr. Akins.     GOC: Full Code.  89M PMHx Afib, HFrEF, with multiple episodes of fall this month had another episode of fall with syncope. Admitted to MICU with:  Assessment:  1. Torsades / NSVT  2. Acute on Chronic HFrEF  3. Bradycardia  4. Bilateral Subdural Hygroma/Hematoma     Plan:  NEURO:   -Monitor mental status closely, avoid neurosuppresants.   -Serial neurologic assessments.     CV:   -Bradycardia improving on Dopamine Infusion, HR now mid 80-90s. No further arrhythmias on Lidocaine Infusion since increased to 2g. Will attempt to wean Lidocaine requirements this evening as able based on symptomatology.   -Maintain goal MAP >65.   -Keep K~4 and Mg>2 for optimal arrhythmia suppression.  -Statin. Spironolactone QD.    -S/p LHC x2 stent to LAD for 80% stenosis, x1 stent to 1st Diag with 95% stenosis. Cards/EP following.     PULM:   -Satting well on RA, will utilize supplemental O2 PRN to maintain goal SpO2 >88%.   -Incentive spirometry, Chest PT/Pulmonary toilet, HOB >30'.     GI:   -NPO.   -Protonix QD.    RENAL:   -Renal function currently WNL.  -trend lytes/Scr daily with BMP  -I's and O's, goal UOP 0.5 cc/kg/hr  -renal dose meds and avoid nephrotoxins     ENDO:   -Aggressive glycemic control to limit FS glucose to <180mg/dl.     ID:   -Afebrile. Cx NGTD thus far. Empiric Rocephin course.   -ABX use and/or discontinuation based on discussion with ID in conjunction with clinical features, culture data, and judicious procalcitonin monitoring.    HEME:   -Heparin Infusion.     Case and plan discussed with MICU attending, Dr. Akins.     GOC: Full Code.

## 2024-07-20 NOTE — PROGRESS NOTE ADULT - SUBJECTIVE AND OBJECTIVE BOX
[facility], THE HEART CENTER                                   [address] [place] [address]                                                      PHONE: [phone]                                                         FAX: [phone]  [email or web address]  ---------------------------------------------------------------------------------------------------------------------------------    Overnight events/patient complaints:      NAD feeling well post PCI     penicillins (Unknown)    MEDICATIONS  (STANDING):  aspirin  chewable 81 milliGRAM(s) Oral daily  atorvastatin 40 milliGRAM(s) Oral at bedtime  cefTRIAXone Injectable. 1000 milliGRAM(s) IV Push every 24 hours  chlorhexidine 2% Cloths 1 Application(s) Topical daily  clopidogrel Tablet 75 milliGRAM(s) Oral daily  DOPamine Infusion 4.992 MICROgram(s)/kG/Min (12 mL/Hr) IV Continuous <Continuous>  heparin  Infusion 900 Unit(s)/Hr (9 mL/Hr) IV Continuous <Continuous>  lidocaine   Infusion 2 mG/Min (15 mL/Hr) IV Continuous <Continuous>  norepinephrine Infusion 0.05 MICROgram(s)/kG/Min (6.01 mL/Hr) IV Continuous <Continuous>  pantoprazole    Tablet 40 milliGRAM(s) Oral before breakfast  spironolactone 25 milliGRAM(s) Oral daily  tamsulosin 0.4 milliGRAM(s) Oral at bedtime    MEDICATIONS  (PRN):  heparin   Injectable 2500 Unit(s) IV Push every 6 hours PRN For aPTT between 40 - 57  heparin   Injectable 5000 Unit(s) IV Push every 6 hours PRN For aPTT less than 40      Vital Signs Last 24 Hrs  T(C): 36.6 (20 Jul 2024 08:30), Max: 36.6 (19 Jul 2024 20:18)  T(F): 97.9 (20 Jul 2024 08:30), Max: 97.9 (20 Jul 2024 08:30)  HR: 77 (20 Jul 2024 08:30) (52 - 112)  BP: 100/45 (20 Jul 2024 08:30) (77/44 - 154/137)  BP(mean): 61 (20 Jul 2024 08:30) (56 - 145)  RR: 15 (20 Jul 2024 08:30) (11 - 32)  SpO2: 97% (20 Jul 2024 08:30) (89% - 100%)    Parameters below as of 20 Jul 2024 08:00  Patient On (Oxygen Delivery Method): room air      ICU Vital Signs Last 24 Hrs  FER JOHNSON  I&O's Detail    19 Jul 2024 07:01  -  20 Jul 2024 07:00  --------------------------------------------------------  IN:    Amiodarone: 116.9 mL    DOPamine Infusion: 132 mL    Heparin Infusion: 70 mL    Lidocaine: 52.5 mL    Lidocaine: 165 mL    Norepinephrine: 27.6 mL  Total IN: 564 mL    OUT:    Indwelling Catheter - Urethral (mL): 175 mL    Voided (mL): 420 mL  Total OUT: 595 mL    Total NET: -31 mL      20 Jul 2024 07:01  -  20 Jul 2024 09:36  --------------------------------------------------------  IN:    DOPamine Infusion: 24 mL    Heparin: 18 mL    Lidocaine: 30 mL    Norepinephrine: 24 mL  Total IN: 96 mL    OUT:    Voided (mL): 350 mL  Total OUT: 350 mL    Total NET: -254 mL        I&O's Summary    19 Jul 2024 07:01  -  20 Jul 2024 07:00  --------------------------------------------------------  IN: 564 mL / OUT: 595 mL / NET: -31 mL    20 Jul 2024 07:01  -  20 Jul 2024 09:36  --------------------------------------------------------  IN: 96 mL / OUT: 350 mL / NET: -254 mL      Drug Dosing Weight  FER JOHNSON      PHYSICAL EXAM:  General: Appears well developed, alert and cooperative.  HEENT: Head; normocephalic, atraumatic.  Eyes: Pupils reactive, cornea wnl.  Neck: Supple, no nodes adenopathy, no NVD or carotid bruit or thyromegaly.  CARDIOVASCULAR: Normal S1 and S2, 1/6 murmur, rub, gallop or lift.   LUNGS: No rales, rhonchi or wheeze. Normal breath sounds bilaterally.  ABDOMEN: Soft, nontender without mass or organomegaly. bowel sounds normoactive.  EXTREMITIES: No clubbing, cyanosis or edema. Distal pulses wnl.   SKIN: warm and dry with normal turgor.  NEURO: Alert/oriented x 3/normal motor exam. No pathologic reflexes.    PSYCH: normal affect.        LABS:                        12.4   8.53  )-----------( 187      ( 20 Jul 2024 03:55 )             36.5     07-20    133<L>  |  96  |  14.5  ----------------------------<  172<H>  4.3   |  21.0<L>  |  0.94    Ca    8.7      20 Jul 2024 08:12  Phos  3.2     07-20  Mg     2.0     07-20    TPro  5.6<L>  /  Alb  2.6<L>  /  TBili  0.6  /  DBili  x   /  AST  12  /  ALT  5   /  AlkPhos  55  07-20    FER JOHNSON  CARDIAC MARKERS ( 18 Jul 2024 15:20 )  x     / x     / 37 U/L / x     / x          PT/INR - ( 18 Jul 2024 15:20 )   PT: 15.0 sec;   INR: 1.36 ratio         PTT - ( 19 Jul 2024 13:05 )  PTT:107.0 sec  Urinalysis Basic - ( 20 Jul 2024 08:12 )    Color: x / Appearance: x / SG: x / pH: x  Gluc: 172 mg/dL / Ketone: x  / Bili: x / Urobili: x   Blood: x / Protein: x / Nitrite: x   Leuk Esterase: x / RBC: x / WBC x   Sq Epi: x / Non Sq Epi: x / Bacteria: x        RADIOLOGY & ADDITIONAL STUDIES:    INTERPRETATION OF TELEMETRY (personally reviewed):    ECG:    ECHO:    STRESS TEST:    CARDIAC CATHETERIZATION:      < from: Cardiac Catheterization (07.19.24 @ 16:55) >  Procedures Performed   Procedures:              1.    Arterial Access - Right Radial     2.  Diagnostic Coronary Angiography   3.    Left Heart Cath   4.    PCI: POBA   5.    PCI: BABAK   6.    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 Technically difficult image quality.   2. Left ventricular systolic function is severely decreased with an ejection fraction of 24 % by Gudino's method of disks.   3. There is severe (grade 3) left ventricular diastolic dysfunction, with elevated left ventricular filling pressure.   4. There is increased LV mass and concentric hypertrophy.   5. Normal right ventricular cavity size and mildly reduced right ventricular systolic function.   6. The left atrium is mildly dilated.   7. Mild mitral regurgitation.   8. Mild tricuspid regurgitation.   9. Mild aortic regurgitation.  10. Moderate aortic stenosis.  11. No prior echocardiogram is available for comparison.    < end of copied text >        89 year old male with a PMHx of Afib on eliquis and HFrEF (diagnosed 6 weeks ago at [facility]) (patient refused cath at that time) who presents from [facility] with syncopal episode (due to possible SDH however found to by hygromas and cleared for anticoagulation by neurosurgery) now found to have torsades on tele.     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 Roger ([facility])

## 2024-07-20 NOTE — CHART NOTE - NSCHARTNOTEFT_GEN_A_CORE
Pt sp LHC via RRA post procedure day one.    Pt A&O x3  RRA site without s/s bleeding or hematoma. Site pink, no sign of infection.

## 2024-07-21 LAB
ALBUMIN SERPL ELPH-MCNC: 2.8 G/DL — LOW (ref 3.3–5.2)
ALP SERPL-CCNC: 58 U/L — SIGNIFICANT CHANGE UP (ref 40–120)
ALT FLD-CCNC: 6 U/L — SIGNIFICANT CHANGE UP
ANION GAP SERPL CALC-SCNC: 14 MMOL/L — SIGNIFICANT CHANGE UP (ref 5–17)
APTT BLD: 98.3 SEC — HIGH (ref 24.5–35.6)
AST SERPL-CCNC: 15 U/L — SIGNIFICANT CHANGE UP
BILIRUB SERPL-MCNC: 0.5 MG/DL — SIGNIFICANT CHANGE UP (ref 0.4–2)
BUN SERPL-MCNC: 12.3 MG/DL — SIGNIFICANT CHANGE UP (ref 8–20)
CALCIUM SERPL-MCNC: 8.5 MG/DL — SIGNIFICANT CHANGE UP (ref 8.4–10.5)
CHLORIDE SERPL-SCNC: 99 MMOL/L — SIGNIFICANT CHANGE UP (ref 96–108)
CO2 SERPL-SCNC: 22 MMOL/L — SIGNIFICANT CHANGE UP (ref 22–29)
CREAT SERPL-MCNC: 0.77 MG/DL — SIGNIFICANT CHANGE UP (ref 0.5–1.3)
EGFR: 87 ML/MIN/1.73M2 — SIGNIFICANT CHANGE UP
GI PCR PANEL: SIGNIFICANT CHANGE UP
GLUCOSE SERPL-MCNC: 99 MG/DL — SIGNIFICANT CHANGE UP (ref 70–99)
HCT VFR BLD CALC: 37.1 % — LOW (ref 39–50)
HGB BLD-MCNC: 12.7 G/DL — LOW (ref 13–17)
MAGNESIUM SERPL-MCNC: 2.1 MG/DL — SIGNIFICANT CHANGE UP (ref 1.6–2.6)
MCHC RBC-ENTMCNC: 29.8 PG — SIGNIFICANT CHANGE UP (ref 27–34)
MCHC RBC-ENTMCNC: 34.2 GM/DL — SIGNIFICANT CHANGE UP (ref 32–36)
MCV RBC AUTO: 87.1 FL — SIGNIFICANT CHANGE UP (ref 80–100)
PHOSPHATE SERPL-MCNC: 2.9 MG/DL — SIGNIFICANT CHANGE UP (ref 2.4–4.7)
PLATELET # BLD AUTO: 186 K/UL — SIGNIFICANT CHANGE UP (ref 150–400)
POTASSIUM SERPL-MCNC: 3.9 MMOL/L — SIGNIFICANT CHANGE UP (ref 3.5–5.3)
POTASSIUM SERPL-SCNC: 3.9 MMOL/L — SIGNIFICANT CHANGE UP (ref 3.5–5.3)
PROT SERPL-MCNC: 5.8 G/DL — LOW (ref 6.6–8.7)
RBC # BLD: 4.26 M/UL — SIGNIFICANT CHANGE UP (ref 4.2–5.8)
RBC # FLD: 15.2 % — HIGH (ref 10.3–14.5)
SODIUM SERPL-SCNC: 135 MMOL/L — SIGNIFICANT CHANGE UP (ref 135–145)
WBC # BLD: 7.88 K/UL — SIGNIFICANT CHANGE UP (ref 3.8–10.5)
WBC # FLD AUTO: 7.88 K/UL — SIGNIFICANT CHANGE UP (ref 3.8–10.5)

## 2024-07-21 PROCEDURE — 99233 SBSQ HOSP IP/OBS HIGH 50: CPT

## 2024-07-21 PROCEDURE — 93010 ELECTROCARDIOGRAM REPORT: CPT

## 2024-07-21 RX ORDER — ACETAMINOPHEN 500 MG
650 TABLET ORAL ONCE
Refills: 0 | Status: COMPLETED | OUTPATIENT
Start: 2024-07-21 | End: 2024-07-21

## 2024-07-21 RX ORDER — MEXILETINE HYDROCHLORIDE 200 MG/1
200 CAPSULE ORAL EVERY 8 HOURS
Refills: 0 | Status: DISCONTINUED | OUTPATIENT
Start: 2024-07-21 | End: 2024-07-26

## 2024-07-21 RX ORDER — DEXTROSE MONOHYDRATE, SODIUM CHLORIDE, SODIUM LACTATE, CALCIUM CHLORIDE, MAGNESIUM CHLORIDE 1.5; 538; 448; 18.4; 5.08 G/100ML; MG/100ML; MG/100ML; MG/100ML; MG/100ML
500 SOLUTION INTRAPERITONEAL ONCE
Refills: 0 | Status: COMPLETED | OUTPATIENT
Start: 2024-07-21 | End: 2024-07-21

## 2024-07-21 RX ADMIN — SPIRONOLACTONE 25 MILLIGRAM(S): 50 TABLET, FILM COATED ORAL at 05:55

## 2024-07-21 RX ADMIN — Medication 81 MILLIGRAM(S): at 12:10

## 2024-07-21 RX ADMIN — MEXILETINE HYDROCHLORIDE 200 MILLIGRAM(S): 200 CAPSULE ORAL at 22:17

## 2024-07-21 RX ADMIN — MEXILETINE HYDROCHLORIDE 200 MILLIGRAM(S): 200 CAPSULE ORAL at 12:11

## 2024-07-21 RX ADMIN — CLOPIDOGREL BISULFATE 75 MILLIGRAM(S): 75 TABLET, FILM COATED ORAL at 12:11

## 2024-07-21 RX ADMIN — ATORVASTATIN CALCIUM 40 MILLIGRAM(S): 40 TABLET, FILM COATED ORAL at 22:17

## 2024-07-21 RX ADMIN — Medication 0.4 MILLIGRAM(S): at 22:17

## 2024-07-21 RX ADMIN — CHLORHEXIDINE GLUCONATE 1 APPLICATION(S): 500 CLOTH TOPICAL at 12:21

## 2024-07-21 RX ADMIN — PANTOPRAZOLE SODIUM 40 MILLIGRAM(S): 20 TABLET, DELAYED RELEASE ORAL at 05:54

## 2024-07-21 RX ADMIN — Medication 650 MILLIGRAM(S): at 12:10

## 2024-07-21 RX ADMIN — HEPARIN SODIUM 9 UNIT(S)/HR: 1000 INJECTION, SOLUTION INTRAVENOUS; SUBCUTANEOUS at 19:57

## 2024-07-21 RX ADMIN — DEXTROSE MONOHYDRATE, SODIUM CHLORIDE, SODIUM LACTATE, CALCIUM CHLORIDE, MAGNESIUM CHLORIDE 1000 MILLILITER(S): 1.5; 538; 448; 18.4; 5.08 SOLUTION INTRAPERITONEAL at 04:39

## 2024-07-21 RX ADMIN — Medication 650 MILLIGRAM(S): at 13:00

## 2024-07-21 NOTE — PROGRESS NOTE ADULT - SUBJECTIVE AND OBJECTIVE BOX
[place] [facility] - [facility], THE HEART CENTER                                   [address] [place] [address]                                                      PHONE: [phone]                                                         FAX: [phone]  [email or web address]  ---------------------------------------------------------------------------------------------------------------------------------    Overnight events/patient complaints:      NAD feeling well today     penicillins (Unknown)    MEDICATIONS  (STANDING):  aspirin  chewable 81 milliGRAM(s) Oral daily  atorvastatin 40 milliGRAM(s) Oral at bedtime  chlorhexidine 2% Cloths 1 Application(s) Topical daily  clopidogrel Tablet 75 milliGRAM(s) Oral daily  heparin  Infusion 900 Unit(s)/Hr (9 mL/Hr) IV Continuous <Continuous>  lidocaine   Infusion 1 mG/Min (7.5 mL/Hr) IV Continuous <Continuous>  pantoprazole    Tablet 40 milliGRAM(s) Oral before breakfast  spironolactone 25 milliGRAM(s) Oral daily  tamsulosin 0.4 milliGRAM(s) Oral at bedtime    MEDICATIONS  (PRN):  heparin   Injectable 5000 Unit(s) IV Push every 6 hours PRN For aPTT less than 40  heparin   Injectable 2500 Unit(s) IV Push every 6 hours PRN For aPTT between 40 - 57      Vital Signs Last 24 Hrs  T(C): 36.7 (21 Jul 2024 04:30), Max: 36.7 (21 Jul 2024 04:30)  T(F): 98 (21 Jul 2024 04:30), Max: 98 (21 Jul 2024 04:30)  HR: 80 (21 Jul 2024 08:30) (63 - 91)  BP: 117/65 (21 Jul 2024 08:30) (88/51 - 148/56)  BP(mean): 80 (21 Jul 2024 08:30) (59 - 110)  RR: 17 (21 Jul 2024 08:30) (9 - 23)  SpO2: 96% (21 Jul 2024 08:30) (91% - 100%)    Parameters below as of 21 Jul 2024 07:45  Patient On (Oxygen Delivery Method): room air      ICU Vital Signs Last 24 Hrs  FER JOHNSON  I&O's Detail    20 Jul 2024 07:01  -  21 Jul 2024 07:00  --------------------------------------------------------  IN:    DOPamine Infusion: 36 mL    Heparin: 216 mL    Lactated Ringers Bolus: 500 mL    Lidocaine: 45 mL    Lidocaine: 157.5 mL    Norepinephrine: 69.6 mL  Total IN: 1024.1 mL    OUT:    Intermittent Catheterization - Urethral (mL): 425 mL    Voided (mL): 600 mL  Total OUT: 1025 mL    Total NET: -0.9 mL        I&O's Summary    20 Jul 2024 07:01  -  21 Jul 2024 07:00  --------------------------------------------------------  IN: 1024.1 mL / OUT: 1025 mL / NET: -0.9 mL      Drug Dosing Weight  FER JOHNSON      PHYSICAL EXAM:  General: Appears well developed, alert and cooperative.  HEENT: Head; normocephalic, atraumatic.  Eyes: Pupils reactive, cornea wnl.  Neck: Supple, no nodes adenopathy, no NVD or carotid bruit or thyromegaly.  CARDIOVASCULAR: Normal S1 and S2, No murmur, rub, gallop or lift.   LUNGS: No rales, rhonchi or wheeze. Normal breath sounds bilaterally.  ABDOMEN: Soft, nontender without mass or organomegaly. bowel sounds normoactive.  EXTREMITIES: No clubbing, cyanosis or edema. Distal pulses wnl.   SKIN: warm and dry with normal turgor.  NEURO: Alert/oriented x 3/normal motor exam. No pathologic reflexes.    PSYCH: normal affect.        LABS:                        12.7   7.88  )-----------( 186      ( 21 Jul 2024 04:25 )             37.1     07-21    135  |  99  |  12.3  ----------------------------<  99  3.9   |  22.0  |  0.77    Ca    8.5      21 Jul 2024 04:25  Phos  2.9     07-21  Mg     2.1     07-21    TPro  5.8<L>  /  Alb  2.8<L>  /  TBili  0.5  /  DBili  x   /  AST  15  /  ALT  6   /  AlkPhos  58  07-21    FER ALEX      PTT - ( 21 Jul 2024 04:25 )  PTT:98.3 sec  Urinalysis Basic - ( 21 Jul 2024 04:25 )    Color: x / Appearance: x / SG: x / pH: x  Gluc: 99 mg/dL / Ketone: x  / Bili: x / Urobili: x   Blood: x / Protein: x / Nitrite: x   Leuk Esterase: x / RBC: x / WBC x   Sq Epi: x / Non Sq Epi: x / Bacteria: x        RADIOLOGY & ADDITIONAL STUDIES:    INTERPRETATION OF TELEMETRY (personally reviewed):      < from: Cardiac Catheterization (07.19.24 @ 16:55) >  Procedures Performed   Procedures:              1.    Arterial Access - Right Radial     2.  Diagnostic Coronary Angiography   3.    Left Heart Cath   4.    PCI: POBA   5.    PCI: BABAK   6.    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 Technically difficult image quality.   2. Left ventricular systolic function is severely decreased with an ejection fraction of 24 % by Gudino's method of disks.   3. There is severe (grade 3) left ventricular diastolic dysfunction, with elevated left ventricular filling pressure.   4. There is increased LV mass and concentric hypertrophy.   5. Normal right ventricular cavity size and mildly reduced right ventricular systolic function.   6. The left atrium is mildly dilated.   7. Mild mitral regurgitation.   8. Mild tricuspid regurgitation.   9. Mild aortic regurgitation.  10. Moderate aortic stenosis.  11. No prior echocardiogram is available for comparison.    < end of copied text >        89 year old male with a PMHx of Afib on eliquis and HFrEF (diagnosed 6 weeks ago at [facility]) (patient refused cath at that time) who presents from [facility] with syncopal episode (due to possible SDH however found to by hygromas and cleared for anticoagulation by neurosurgery) now found to have torsades on tele.     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 Roger ([facility])

## 2024-07-21 NOTE — PROGRESS NOTE ADULT - SUBJECTIVE AND OBJECTIVE BOX
Valencia Chisholm M.D.    Patient is a 89y old  Male who presents with a chief complaint of AFIB with RVR with syncope and collapse (21 Jul 2024 13:52)    89 year old male with known Afib and systolic cardiomyopathy presented initially went to [facility] after syncope and collapse and fall and found to have a SDH and transferred to [facility] for neurosx evaluation. No intervenito indicated nad cleared for Eliquis. Hospital course c/b RRT for Torsades that did not respond to Amiodarone. Transferred to the ICU for Lidocaine drip and close monitoring. Breifly on pressors and then quickly weaned off. Now s/p LHC, with 80% stenosis proximal LAD s/p 2 BABAK and 95% stenosis of the diagonal artery s/p 1 BABAK. Now weaned off Lidocaine drip and started on Mexiletine. Stable for downgrade to medicine.     MEDICATIONS  (STANDING):  aspirin  chewable 81 milliGRAM(s) Oral daily  atorvastatin 40 milliGRAM(s) Oral at bedtime  chlorhexidine 2% Cloths 1 Application(s) Topical daily  clopidogrel Tablet 75 milliGRAM(s) Oral daily  heparin  Infusion 900 Unit(s)/Hr (9 mL/Hr) IV Continuous <Continuous>  mexiletine 200 milliGRAM(s) Oral every 8 hours  pantoprazole    Tablet 40 milliGRAM(s) Oral before breakfast  spironolactone 25 milliGRAM(s) Oral daily  tamsulosin 0.4 milliGRAM(s) Oral at bedtime    MEDICATIONS  (PRN):  heparin   Injectable 5000 Unit(s) IV Push every 6 hours PRN For aPTT less than 40  heparin   Injectable 2500 Unit(s) IV Push every 6 hours PRN For aPTT between 40 - 57      I&O's Summary    20 Jul 2024 07:01  -  21 Jul 2024 07:00  --------------------------------------------------------  IN: 1024.1 mL / OUT: 1025 mL / NET: -0.9 mL    21 Jul 2024 07:01  -  21 Jul 2024 15:11  --------------------------------------------------------  IN: 76.5 mL / OUT: 325 mL / NET: -248.5 mL        PHYSICAL EXAM:  Vital Signs Last 24 Hrs  T(C): 36.5 (21 Jul 2024 09:00), Max: 36.7 (21 Jul 2024 04:30)  T(F): 97.7 (21 Jul 2024 09:00), Max: 98 (21 Jul 2024 04:30)  HR: 85 (21 Jul 2024 14:30) (66 - 129)  BP: 98/60 (21 Jul 2024 14:30) (94/60 - 148/56)  BP(mean): 74 (21 Jul 2024 14:30) (62 - 111)  RR: 11 (21 Jul 2024 14:30) (9 - 27)  SpO2: 97% (21 Jul 2024 14:30) (91% - 100%)    Parameters below as of 21 Jul 2024 12:00  Patient On (Oxygen Delivery Method): room air    CONSTITUTIONAL: NAD, well-groomed  RESPIRATORY: Normal respiratory effort; B/l diminished bs b/l  CARDIOVASCULAR: Regular rate, no LE edema  ABDOMEN: Nontender to palpation, normoactive bowel sounds  PSYCH: A+O x3; affect appropriate    LABS:                        12.7   7.88  )-----------( 186      ( 21 Jul 2024 04:25 )             37.1     07-21    135  |  99  |  12.3  ----------------------------<  99  3.9   |  22.0  |  0.77    Ca    8.5      21 Jul 2024 04:25  Phos  2.9     07-21  Mg     2.1     07-21    TPro  5.8<L>  /  Alb  2.8<L>  /  TBili  0.5  /  DBili  x   /  AST  15  /  ALT  6   /  AlkPhos  58  07-21    PTT - ( 21 Jul 2024 04:25 )  PTT:98.3 sec      Urinalysis Basic - ( 21 Jul 2024 04:25 )    Color: x / Appearance: x / SG: x / pH: x  Gluc: 99 mg/dL / Ketone: x  / Bili: x / Urobili: x   Blood: x / Protein: x / Nitrite: x   Leuk Esterase: x / RBC: x / WBC x   Sq Epi: x / Non Sq Epi: x / Bacteria: x        CAPILLARY BLOOD GLUCOSE          RADIOLOGY & ADDITIONAL TESTS:  Results Reviewed:   Imaging Personally Reviewed:  Electrocardiogram Personally Reviewed:

## 2024-07-21 NOTE — PROGRESS NOTE ADULT - ASSESSMENT
89M PMHx Afib, HFrEF, with multiple episodes of fall this month had another episode of fall with syncope. Admitted to MICU with:  Assessment:  1. Torsades / NSVT  2. Acute on Chronic HFrEF  3. Bradycardia  4. Bilateral Subdural Hygroma/Hematoma       Plan  Neuro: no issues   Cardio: wean off lidocaine infusion with addition of Mexiletine 200mg q8. BB to be started tomorrow per cardiology. cont aldactone   Pulm: no issues  GI: diet as tolerated, PPI  Renal: strict i/o, renally dose meds prn  ID: GI PCR for loose stools   Heme: cont heparin infusion , cont DAPT, statin   Endo: no issues       Dispo: Pt is DNR/I, transfer to stepdown unit , case d/w Dr. Muniz  89M PMHx Afib, HFrEF, with multiple episodes of fall this month had another episode of fall with syncope. Admitted to MICU with:  Assessment:  1. Torsades / NSVT  2. Acute on Chronic HFrEF  3. Bradycardia  4. Bilateral Subdural Hygroma/Hematoma       Plan  Neuro: possible SDH however found to by hygromas and cleared for anticoagulation by neurosurgery, no issues    Cardio: wean off lidocaine infusion with addition of Mexiletine 200mg q8. BB to be started tomorrow per cardiology. cont aldactone. Still may need AICD  Pulm: no issues  GI: diet as tolerated, PPI  Renal: strict i/o, renally dose meds prn  ID: GI PCR for loose stools   Heme: cont heparin infusion (incase AICD required) , cont DAPT, statin   Endo: no issues       Dispo: Pt is DNR/I, transfer to stepdown unit , case d/w Dr. Muniz

## 2024-07-21 NOTE — PROGRESS NOTE ADULT - NS PANP COMMENT GEN_ALL_CORE FT
89M with hx of Afib on Eliquis, HFrEF presented as a transfer from [facility] where he initially presented with syncope and fall and was fount have Afib RVR with SDH noted on CT head. Pt was seen by neurosurgery with no plan for surgical intervention w/ stable SDH on repeat and admitted for syncopal workup. RRT called  7/18 as pt noted to be in torsades on telemetry with subsequent NSVT and was started on amiodarone gtt/lidocaine gtt and taken for C 7/19 w/ BABAK x 2 to LAD and to diag x 1. Was noted with prolonged Qtc so amiodarone discontinued 7/19 and was started on dopamine given bradycardia with increased lidocaine infusion due to recurrent NSVT.   Echo with EF 24%, with severe diastolic dysfunction and mod AS. Weaned off dopamine yesterday and levophed titrated off since 3pm yesterday. Lidocaine decreased to 1mcg/min yesterday and has had no recurrent VT. Will transition to mexiletine 200mg TID and discontinue lidocaine gtt per discussion with cardiology. Qtc improved on EKG today. Did have some runs of Afib RVR that self resolved. Will add beta blocker tomorrow if tolerates and additional GDMT if BP stable. c/w DAPT. Pt without any acute complaints. Will maintain on heparin gtt for now as unclear if will still need ICD. Will need PT eval. Stable for transfer to SDU for further care.
89M with hx of Afib on Eliquis, HFrEF [place] [facility] [place] with sync[place] and fall and was fount have Afib RVR with SDH noted on CT head. Pt was seen by neurosurgery with no plan for surgical intervention w/ stable SDH on repeat and admitted for syncopal workup. RRT called  7/18 as pt noted to be in torsades on telemetry with subsequent NSVT and was started on amiodarone gtt/lidocaine gtt and taken for [facility] 7/19 w/ BABAK x 2 to LAD and to diag x 1. Was noted with prolonged Qtc so amiodarone discontinued 7/19 and was started on dopamine given bradycardia with increased lidocaine infusion due to recurrent NSVT.  No further arrhthymias noted overnight. Discussed with cardiology and will wean off dopamine today and decrease lidocaine to 1 if tolerates. PMVT likely in the setting of ischemia and hopefully will resolve post PCI. Will attempt to wean levophed off as tolerated. Echo with EF 24%, with severe diastolic dysfunction and mod AS. Unable to restart GDMT due to hypotension. Will continue to trend Qtc. c/w DAPT/heparin gtt. DNR/DNI reinstated per patient request and MOLST completed.

## 2024-07-21 NOTE — OCCUPATIONAL THERAPY INITIAL EVALUATION ADULT - GENERAL OBSERVATIONS, REHAB EVAL
Left pt as received supine in bed, NAD, +IV, +Tele//BP, +bed alarm, +seth on RA A&Ox2-3. Pre/post pain 0/10 pt reports feeling cold and very weak/tired. Pt agreeable to OT evaluation

## 2024-07-21 NOTE — PHYSICAL THERAPY INITIAL EVALUATION ADULT - GENERAL OBSERVATIONS, REHAB EVAL
Pt received supine in bed + telemetry//BP + IV + [place] catheter. Pt c/o 0/10 pain, pt agreeable to PT

## 2024-07-21 NOTE — PROGRESS NOTE ADULT - SUBJECTIVE AND OBJECTIVE BOX
Date of entry of this note is equal to the date of services rendered     Patient is a 89y old  Male who presents with a chief complaint of AFIB with RVR with syncope and collapse (21 Jul 2024 09:04)      BRIEF HOSPITAL COURSE:   89M PMHx Afib, HFrEF, with multiple episodes of fall this month had another episode of fall with syncope initially presented to [facility] with concerns of head bleed transferred to [facility] on 7/15 admitted to medicine after being seen by neurosurgery and trauma surgery where he was found to have bilateral 6 mm subdural hygroma/hematoma which remained stable on subsequent to CT head and was cleared  from trauma surgery as well as neurosurgery admitted to medicine for evaluation management of syncope, as well as A-fib with RVR. Course c/b Torsade with syncope and spontaneous resolution but subsequent multiple episode of NSVT. Admitted to MICU with recurrent ventricular tachycardia, Acute on Chronic HFrEF, bilateral subdural hygroma/hematoma. S/p LHC x2 stent to LAD for 80% stenosis, x1 stent to 1st Diag with 95% stenosis.          Events last 24 hours:   wean off lidocaine infusion   add Mexiletine 200mg q8   Transfer to step down       Social history:    PAST MEDICAL & SURGICAL HISTORY:  Afib        Allergies    penicillins (Unknown)    Intolerances      FAMILY HISTORY:      Review of Systems:  CONSTITUTIONAL: No fever, chills, + fatigue  EYES: No eye pain, visual disturbances, or discharge  ENMT:  No difficulty hearing, tinnitus, vertigo; No sinus or throat pain  NECK: No pain or stiffness  RESPIRATORY: No cough, wheezing, chills or hemoptysis; No shortness of breath  CARDIOVASCULAR: No chest pain, palpitations, dizziness, or leg swelling  GASTROINTESTINAL: No abdominal or epigastric pain. No nausea, vomiting, or hematemesis; No diarrhea or constipation. No melena or hematochezia.  GENITOURINARY: No dysuria, frequency, hematuria, or incontinence  NEUROLOGICAL: No headaches, memory loss, loss of strength, numbness, or tremors  SKIN: No itching, burning, rashes, or lesions   MUSCULOSKELETAL: No joint pain or swelling; No muscle, back, or extremity pain  PSYCHIATRIC: No depression, anxiety, mood swings, or difficulty sleeping      Medications:    mexiletine 200 milliGRAM(s) Oral every 8 hours  spironolactone 25 milliGRAM(s) Oral daily          aspirin  chewable 81 milliGRAM(s) Oral daily  clopidogrel Tablet 75 milliGRAM(s) Oral daily  heparin   Injectable 2500 Unit(s) IV Push every 6 hours PRN  heparin   Injectable 5000 Unit(s) IV Push every 6 hours PRN  heparin  Infusion 900 Unit(s)/Hr IV Continuous <Continuous>    pantoprazole    Tablet 40 milliGRAM(s) Oral before breakfast    tamsulosin 0.4 milliGRAM(s) Oral at bedtime    atorvastatin 40 milliGRAM(s) Oral at bedtime        chlorhexidine 2% Cloths 1 Application(s) Topical daily            ICU Vital Signs Last 24 Hrs  T(C): 36.5 (21 Jul 2024 09:00), Max: 36.7 (21 Jul 2024 04:30)  T(F): 97.7 (21 Jul 2024 09:00), Max: 98 (21 Jul 2024 04:30)  HR: 92 (21 Jul 2024 13:00) (66 - 129)  BP: 132/57 (21 Jul 2024 13:00) (94/60 - 148/56)  BP(mean): 76 (21 Jul 2024 13:00) (62 - 110)  ABP: --  ABP(mean): --  RR: 19 (21 Jul 2024 13:00) (9 - 23)  SpO2: 99% (21 Jul 2024 13:00) (91% - 100%)    O2 Parameters below as of 21 Jul 2024 12:00  Patient On (Oxygen Delivery Method): room air          Vital Signs Last 24 Hrs  T(C): 36.5 (21 Jul 2024 09:00), Max: 36.7 (21 Jul 2024 04:30)  T(F): 97.7 (21 Jul 2024 09:00), Max: 98 (21 Jul 2024 04:30)  HR: 92 (21 Jul 2024 13:00) (66 - 129)  BP: 132/57 (21 Jul 2024 13:00) (94/60 - 148/56)  BP(mean): 76 (21 Jul 2024 13:00) (62 - 110)  RR: 19 (21 Jul 2024 13:00) (9 - 23)  SpO2: 99% (21 Jul 2024 13:00) (91% - 100%)    Parameters below as of 21 Jul 2024 12:00  Patient On (Oxygen Delivery Method): room air            I&O's Detail    20 Jul 2024 07:01  -  21 Jul 2024 07:00  --------------------------------------------------------  IN:    DOPamine Infusion: 36 mL    Heparin: 216 mL    Lactated Ringers Bolus: 500 mL    Lidocaine: 45 mL    Lidocaine: 157.5 mL    Norepinephrine: 69.6 mL  Total IN: 1024.1 mL    OUT:    Intermittent Catheterization - Urethral (mL): 425 mL    Voided (mL): 600 mL  Total OUT: 1025 mL    Total NET: -0.9 mL      21 Jul 2024 07:01  -  21 Jul 2024 13:52  --------------------------------------------------------  IN:    Heparin: 54 mL    Lidocaine: 22.5 mL  Total IN: 76.5 mL    OUT:    Intermittent Catheterization - Urethral (mL): 325 mL  Total OUT: 325 mL    Total NET: -248.5 mL            LABS:                        12.7   7.88  )-----------( 186      ( 21 Jul 2024 04:25 )             37.1     07-21    135  |  99  |  12.3  ----------------------------<  99  3.9   |  22.0  |  0.77    Ca    8.5      21 Jul 2024 04:25  Phos  2.9     07-21  Mg     2.1     07-21    TPro  5.8<L>  /  Alb  2.8<L>  /  TBili  0.5  /  DBili  x   /  AST  15  /  ALT  6   /  AlkPhos  58  07-21          CAPILLARY BLOOD GLUCOSE      POCT Blood Glucose.: 156 mg/dL (20 Jul 2024 04:46)    PTT - ( 21 Jul 2024 04:25 )  PTT:98.3 sec  Urinalysis Basic - ( 21 Jul 2024 04:25 )    Color: x / Appearance: x / SG: x / pH: x  Gluc: 99 mg/dL / Ketone: x  / Bili: x / Urobili: x   Blood: x / Protein: x / Nitrite: x   Leuk Esterase: x / RBC: x / WBC x   Sq Epi: x / Non Sq Epi: x / Bacteria: x      CULTURES:  C Diff by PCR Result: NotDetec (07-18 @ 12:44)  Culture Results:   No growth at 4 days (07-16 @ 20:38)  Culture Results:   >100,000 CFU/ml Coag Negative Staphylococcus "Susceptibilities not  performed" (07-16 @ 01:30)      Physical Examination:    General: elderly male in bed, NAD    HEENT: Pupils equal, reactive to light.  Symmetric.    PULM: b/l air entry     CVS: +s1/s2    ABD: Soft    EXT: No edema    SKIN: Warm     Neuro: awake and alert     RADIOLOGY:   < from: Xray Chest 1 View- PORTABLE-Routine (Xray Chest 1 View- PORTABLE-Routine in AM.) (07.17.24 @ 04:37) >    PRIOR: 7/15/24    CLINICAL INDICATION: Assess for infection.    TECHNIQUE: AP radiograph of the chest.    FINDINGS:  Heart is stably enlarged.  Redemonstration of mild pulmonary vascular congestion without large   airspace consolidation or pleural effusion. No pneumothorax.    IMPRESSION:  Stable mild pulmonary vascular congestion.  No focal consolidation        Time spent on this patient encounter, which includes documenting this note in the electronic medical record, was 57 minutes including assessing the presenting problems with associated risks, reviewing the medical record to prepare for the encounter, and meeting face to face with the patient to obtain additional history.  I have also performed an appropriate physical exam, made interventions listed and ordered and interpreted appropriate diagnostic studies as documented.     To improve communication and patient safety, I have coordinated care with the multidisciplinary team including the bedside nurse, appropriate attending of record and consultants as needed.

## 2024-07-21 NOTE — OCCUPATIONAL THERAPY INITIAL EVALUATION ADULT - ADDITIONAL COMMENTS
Pt is questionable hx, appreciate social work to confirm all info. Pt lives alone in a private home. Has family nearby (Daughter, Son and grandson) Pt unsure about steps to enter or steps inside. Pt has a tub with doors. Pt was independent PTA with a SAC, Pt owns SAC and RW. Pt is right handed and drives

## 2024-07-21 NOTE — PHYSICAL THERAPY INITIAL EVALUATION ADULT - IMPAIRED TRANSFERS: SIT/STAND, REHAB EVAL
Pt unable to achieve full standing position/impaired balance/impaired postural control/decreased strength

## 2024-07-21 NOTE — PHYSICAL THERAPY INITIAL EVALUATION ADULT - PERTINENT HX OF CURRENT PROBLEM, REHAB EVAL
89M PMHx Afib, HFrEF, with multiple episodes of fall this month had another episode of fall with syncope initially presented to [facility] with concerns of head bleed transferred to [facility] on 7/15 admitted to medicine after being seen by neurosurgery and trauma surgery where he was found to have bilateral 6 mm subdural hygroma/hematoma which remained stable on subsequent to CT head and was cleared  from trauma surgery as well as neurosurgery admitted to medicine for evaluation management of syncope, as well as A-fib with RVR. Course c/b Torsade with syncope and spontaneous resolution but subsequent multiple episode of NSVT. Admitted to MICU with recurrent ventricular tachycardia, Acute on Chronic HFrEF, bilateral subdural hygroma/hematoma. S/p LHC x2 stent to LAD for 80% stenosis, x1 stent to 1st Diag with 95% stenosis.

## 2024-07-21 NOTE — PHYSICAL THERAPY INITIAL EVALUATION ADULT - ADDITIONAL COMMENTS
Pt lives alone in a private home. Has family nearby (Daughter, Son and grandson) Pt unsure about steps to enter or steps inside. Pt was independent PTA with a SAC, Pt owns SAC and RW.

## 2024-07-21 NOTE — PROGRESS NOTE ADULT - ASSESSMENT
89 y/o M with PMH of Afib, systolic cardiomyopathy presented to [facility] with syncope found to have a SDH, transferred to [facility] for neurosx eval. Course c/n NSVT / Torsades, s/p Lidocaine drip and s/p LHC with BABAK x3. Currently downgraded to medicine.     Torsades  NSVT  - Telemetry monitoring  - s/p Lidocaine and amiodarone drip  - started on Mexiletine 200mg TID 7/21  - to start BB per cards, likely in AM if stable hemodynamics  - transfer to SDU for now  - EP following, meggan recs    Ischemic cardiomyopathy  - TTE from [facility] with EF 15%  - s/p LHC 7/19 with BABAK x 3  - c/w DAPT  - c/w statin  - keep K to 4, Mg to 2  - c/w Aldactone   - Further GDMT per cards    Bilateral subdural hygromas/hematoma  - Neurosurgery recommendations appreciated  - Per NSG, no absolute contraindication for Eliquis given on acute hemorrhage  - No antiseizure medications recommended    Afib  - c/w Heparin drip, change to Eliquis on discharge   - to start BB per cards, likely in AM    Leukocytosis - now resolved   - Blood culture negative  - Urine culture positive for coag negative staph  - s/p Ceftriaxone x 5D    DVT ppx - Heparin drip  Dispo: Acute pending clearance by EP, cards    PT -- BIJU

## 2024-07-21 NOTE — PHYSICAL THERAPY INITIAL EVALUATION ADULT - SENSORY TESTS
(+) eye tracking bilaterally in all directions; (+) acuity in all fields; (+) finger to thumb coordination RUE intact, impaired LUE

## 2024-07-21 NOTE — OCCUPATIONAL THERAPY INITIAL EVALUATION ADULT - RANGE OF MOTION EXAMINATION, UPPER EXTREMITY
except for BUE shoulder flexion to 90 degrees only/bilateral UE Active ROM was WFL  (within functional limits)

## 2024-07-21 NOTE — OCCUPATIONAL THERAPY INITIAL EVALUATION ADULT - DIAGNOSIS, OT EVAL
89 year old Male with PMHx Afib, HFrEF, with multiple episodes of fall this month had another episode of fall with syncope initially presented to [facility] with concerns of head bleed transferred to [facility] on 7/15 admitted to medicine after being seen by neurosx and trauma sx where he was found to have b/l 6 mm subdural hygroma/hematoma which remained stable on subsequent to CT head and was cleared  from trauma sx as well as neurosx admitted to medicine for evaluation management of syncope, as well as A-fib with RVR. Course c/b Torsade with syncope and spontaneous resolution but subsequent multiple episode of NSVT. Admitted to MICU with recurrent ventricular tachycardia, Acute on Chronic HFrEF, b/l subdural hygroma/hematoma. S/p LHC x2 stent to LAD for 80% stenosis, x1 stent to 1st Diag with 95% stenosis.

## 2024-07-21 NOTE — PHYSICAL THERAPY INITIAL EVALUATION ADULT - CRITERIA FOR SKILLED THERAPEUTIC INTERVENTIONS
BIJU/impairments found/rehab potential/anticipated equipment needs at discharge/anticipated discharge recommendation

## 2024-07-22 LAB
ANION GAP SERPL CALC-SCNC: 14 MMOL/L — SIGNIFICANT CHANGE UP (ref 5–17)
APTT BLD: 88.4 SEC — HIGH (ref 24.5–35.6)
BUN SERPL-MCNC: 12.9 MG/DL — SIGNIFICANT CHANGE UP (ref 8–20)
CALCIUM SERPL-MCNC: 7.9 MG/DL — LOW (ref 8.4–10.5)
CHLORIDE SERPL-SCNC: 100 MMOL/L — SIGNIFICANT CHANGE UP (ref 96–108)
CO2 SERPL-SCNC: 21 MMOL/L — LOW (ref 22–29)
CREAT SERPL-MCNC: 0.75 MG/DL — SIGNIFICANT CHANGE UP (ref 0.5–1.3)
CULTURE RESULTS: SIGNIFICANT CHANGE UP
EGFR: 87 ML/MIN/1.73M2 — SIGNIFICANT CHANGE UP
GLUCOSE SERPL-MCNC: 91 MG/DL — SIGNIFICANT CHANGE UP (ref 70–99)
HCT VFR BLD CALC: 37.4 % — LOW (ref 39–50)
HGB BLD-MCNC: 12.6 G/DL — LOW (ref 13–17)
MCHC RBC-ENTMCNC: 29.5 PG — SIGNIFICANT CHANGE UP (ref 27–34)
MCHC RBC-ENTMCNC: 33.7 GM/DL — SIGNIFICANT CHANGE UP (ref 32–36)
MCV RBC AUTO: 87.6 FL — SIGNIFICANT CHANGE UP (ref 80–100)
PLATELET # BLD AUTO: 204 K/UL — SIGNIFICANT CHANGE UP (ref 150–400)
POTASSIUM SERPL-MCNC: 3.4 MMOL/L — LOW (ref 3.5–5.3)
POTASSIUM SERPL-SCNC: 3.4 MMOL/L — LOW (ref 3.5–5.3)
RBC # BLD: 4.27 M/UL — SIGNIFICANT CHANGE UP (ref 4.2–5.8)
RBC # FLD: 15.4 % — HIGH (ref 10.3–14.5)
SODIUM SERPL-SCNC: 135 MMOL/L — SIGNIFICANT CHANGE UP (ref 135–145)
SPECIMEN SOURCE: SIGNIFICANT CHANGE UP
WBC # BLD: 7.37 K/UL — SIGNIFICANT CHANGE UP (ref 3.8–10.5)
WBC # FLD AUTO: 7.37 K/UL — SIGNIFICANT CHANGE UP (ref 3.8–10.5)

## 2024-07-22 PROCEDURE — 99232 SBSQ HOSP IP/OBS MODERATE 35: CPT

## 2024-07-22 RX ORDER — METOPROLOL TARTRATE 100 MG
12.5 TABLET ORAL DAILY
Refills: 0 | Status: DISCONTINUED | OUTPATIENT
Start: 2024-07-22 | End: 2024-07-23

## 2024-07-22 RX ORDER — POTASSIUM CHLORIDE 1500 MG/1
40 TABLET, EXTENDED RELEASE ORAL ONCE
Refills: 0 | Status: COMPLETED | OUTPATIENT
Start: 2024-07-22 | End: 2024-07-22

## 2024-07-22 RX ADMIN — HEPARIN SODIUM 9 UNIT(S)/HR: 1000 INJECTION, SOLUTION INTRAVENOUS; SUBCUTANEOUS at 07:33

## 2024-07-22 RX ADMIN — CLOPIDOGREL BISULFATE 75 MILLIGRAM(S): 75 TABLET, FILM COATED ORAL at 12:58

## 2024-07-22 RX ADMIN — POTASSIUM CHLORIDE 40 MILLIEQUIVALENT(S): 1500 TABLET, EXTENDED RELEASE ORAL at 15:32

## 2024-07-22 RX ADMIN — PANTOPRAZOLE SODIUM 40 MILLIGRAM(S): 20 TABLET, DELAYED RELEASE ORAL at 05:15

## 2024-07-22 RX ADMIN — Medication 0.4 MILLIGRAM(S): at 21:10

## 2024-07-22 RX ADMIN — MEXILETINE HYDROCHLORIDE 200 MILLIGRAM(S): 200 CAPSULE ORAL at 05:15

## 2024-07-22 RX ADMIN — HEPARIN SODIUM 9 UNIT(S)/HR: 1000 INJECTION, SOLUTION INTRAVENOUS; SUBCUTANEOUS at 15:32

## 2024-07-22 RX ADMIN — MEXILETINE HYDROCHLORIDE 200 MILLIGRAM(S): 200 CAPSULE ORAL at 21:11

## 2024-07-22 RX ADMIN — CHLORHEXIDINE GLUCONATE 1 APPLICATION(S): 500 CLOTH TOPICAL at 12:59

## 2024-07-22 RX ADMIN — MEXILETINE HYDROCHLORIDE 200 MILLIGRAM(S): 200 CAPSULE ORAL at 15:32

## 2024-07-22 RX ADMIN — SPIRONOLACTONE 25 MILLIGRAM(S): 50 TABLET, FILM COATED ORAL at 05:15

## 2024-07-22 RX ADMIN — Medication 12.5 MILLIGRAM(S): at 12:58

## 2024-07-22 RX ADMIN — Medication 81 MILLIGRAM(S): at 12:58

## 2024-07-22 RX ADMIN — ATORVASTATIN CALCIUM 40 MILLIGRAM(S): 40 TABLET, FILM COATED ORAL at 21:10

## 2024-07-22 RX ADMIN — HEPARIN SODIUM 9 UNIT(S)/HR: 1000 INJECTION, SOLUTION INTRAVENOUS; SUBCUTANEOUS at 19:48

## 2024-07-22 NOTE — PROGRESS NOTE ADULT - SUBJECTIVE AND OBJECTIVE BOX
Patient seen and chart reviewed. QTc improving on serial EKG. No recurrence of torsades since PCI on Friday.    - Patient DNR and does not want an ICD  - Continue medical therapy

## 2024-07-22 NOTE — PROGRESS NOTE ADULT - SUBJECTIVE AND OBJECTIVE BOX
Patient is a 89y old  Male who presents with a chief complaint of AFIB with RVR with syncope and collapse (22 Jul 2024 10:08)    INTERVAL HPI/OVERNIGHT EVENTS: No acute events overnight. HD stable. Patient endorses feeling nauseous.     MEDICATIONS  (STANDING):  aspirin  chewable 81 milliGRAM(s) Oral daily  atorvastatin 40 milliGRAM(s) Oral at bedtime  chlorhexidine 2% Cloths 1 Application(s) Topical daily  clopidogrel Tablet 75 milliGRAM(s) Oral daily  heparin  Infusion 900 Unit(s)/Hr (9 mL/Hr) IV Continuous <Continuous>  metoprolol succinate ER 12.5 milliGRAM(s) Oral daily  mexiletine 200 milliGRAM(s) Oral every 8 hours  pantoprazole    Tablet 40 milliGRAM(s) Oral before breakfast  spironolactone 25 milliGRAM(s) Oral daily  tamsulosin 0.4 milliGRAM(s) Oral at bedtime    MEDICATIONS  (PRN):  heparin   Injectable 2500 Unit(s) IV Push every 6 hours PRN For aPTT between 40 - 57  heparin   Injectable 5000 Unit(s) IV Push every 6 hours PRN For aPTT less than 40      Allergies    penicillins (Unknown)    Intolerances        REVIEW OF SYSTEMS: all negative with exception of above    Vital Signs Last 24 Hrs  T(C): 36.4 (22 Jul 2024 07:55), Max: 36.8 (21 Jul 2024 20:00)  T(F): 97.6 (22 Jul 2024 07:55), Max: 98.2 (21 Jul 2024 20:00)  HR: 73 (22 Jul 2024 12:00) (72 - 133)  BP: 126/58 (22 Jul 2024 12:00) (98/60 - 151/88)  BP(mean): 89 (22 Jul 2024 12:00) (70 - 111)  RR: 16 (22 Jul 2024 12:00) (11 - 27)  SpO2: 97% (22 Jul 2024 12:00) (97% - 100%)    Parameters below as of 22 Jul 2024 12:00  Patient On (Oxygen Delivery Method): room air        PHYSICAL EXAM:  CONSTITUTIONAL: NAD, well-groomed  RESPIRATORY: Normal respiratory effort; B/l diminished bs b/l  CARDIOVASCULAR: Regular rate, no LE edema  ABDOMEN: Nontender to palpation, normoactive bowel sounds  PSYCH: A+O x3; affect appropriate    LABS:                        12.6   7.37  )-----------( 204      ( 22 Jul 2024 04:59 )             37.4     07-22    135  |  100  |  12.9  ----------------------------<  91  3.4<L>   |  21.0<L>  |  0.75    Ca    7.9<L>      22 Jul 2024 04:59  Phos  2.9     07-21  Mg     2.1     07-21    TPro  5.8<L>  /  Alb  2.8<L>  /  TBili  0.5  /  DBili  x   /  AST  15  /  ALT  6   /  AlkPhos  58  07-21    PTT - ( 22 Jul 2024 04:59 )  PTT:88.4 sec  Urinalysis Basic - ( 22 Jul 2024 04:59 )    Color: x / Appearance: x / SG: x / pH: x  Gluc: 91 mg/dL / Ketone: x  / Bili: x / Urobili: x   Blood: x / Protein: x / Nitrite: x   Leuk Esterase: x / RBC: x / WBC x   Sq Epi: x / Non Sq Epi: x / Bacteria: x      CAPILLARY BLOOD GLUCOSE          RADIOLOGY & ADDITIONAL TESTS:    Imaging Personally Reviewed:  [ ] YES  [ ] NO    Consultant(s) Notes Reviewed:  [ ] YES  [ ] NO    Care Discussed with Consultants/Other Providers [ ] YES  [ ] NO Patient is a 89y old  Male who presents with a chief complaint of AFIB with RVR with syncope and collapse (22 Jul 2024 10:08)    INTERVAL HPI/OVERNIGHT EVENTS: No acute events overnight. HD stable    MEDICATIONS  (STANDING):  aspirin  chewable 81 milliGRAM(s) Oral daily  atorvastatin 40 milliGRAM(s) Oral at bedtime  chlorhexidine 2% Cloths 1 Application(s) Topical daily  clopidogrel Tablet 75 milliGRAM(s) Oral daily  heparin  Infusion 900 Unit(s)/Hr (9 mL/Hr) IV Continuous <Continuous>  metoprolol succinate ER 12.5 milliGRAM(s) Oral daily  mexiletine 200 milliGRAM(s) Oral every 8 hours  pantoprazole    Tablet 40 milliGRAM(s) Oral before breakfast  spironolactone 25 milliGRAM(s) Oral daily  tamsulosin 0.4 milliGRAM(s) Oral at bedtime    MEDICATIONS  (PRN):  heparin   Injectable 2500 Unit(s) IV Push every 6 hours PRN For aPTT between 40 - 57  heparin   Injectable 5000 Unit(s) IV Push every 6 hours PRN For aPTT less than 40      Allergies    penicillins (Unknown)    Intolerances        REVIEW OF SYSTEMS: all negative with exception of above    Vital Signs Last 24 Hrs  T(C): 36.4 (22 Jul 2024 07:55), Max: 36.8 (21 Jul 2024 20:00)  T(F): 97.6 (22 Jul 2024 07:55), Max: 98.2 (21 Jul 2024 20:00)  HR: 73 (22 Jul 2024 12:00) (72 - 133)  BP: 126/58 (22 Jul 2024 12:00) (98/60 - 151/88)  BP(mean): 89 (22 Jul 2024 12:00) (70 - 111)  RR: 16 (22 Jul 2024 12:00) (11 - 27)  SpO2: 97% (22 Jul 2024 12:00) (97% - 100%)    Parameters below as of 22 Jul 2024 12:00  Patient On (Oxygen Delivery Method): room air        PHYSICAL EXAM:  CONSTITUTIONAL: NAD, well-groomed  RESPIRATORY: Normal respiratory effort; B/l diminished bs b/l  CARDIOVASCULAR: Regular rate, no LE edema  ABDOMEN: Nontender to palpation, normoactive bowel sounds  PSYCH: A+O x3; affect appropriate    LABS:                        12.6   7.37  )-----------( 204      ( 22 Jul 2024 04:59 )             37.4     07-22    135  |  100  |  12.9  ----------------------------<  91  3.4<L>   |  21.0<L>  |  0.75    Ca    7.9<L>      22 Jul 2024 04:59  Phos  2.9     07-21  Mg     2.1     07-21    TPro  5.8<L>  /  Alb  2.8<L>  /  TBili  0.5  /  DBili  x   /  AST  15  /  ALT  6   /  AlkPhos  58  07-21    PTT - ( 22 Jul 2024 04:59 )  PTT:88.4 sec  Urinalysis Basic - ( 22 Jul 2024 04:59 )    Color: x / Appearance: x / SG: x / pH: x  Gluc: 91 mg/dL / Ketone: x  / Bili: x / Urobili: x   Blood: x / Protein: x / Nitrite: x   Leuk Esterase: x / RBC: x / WBC x   Sq Epi: x / Non Sq Epi: x / Bacteria: x      CAPILLARY BLOOD GLUCOSE          RADIOLOGY & ADDITIONAL TESTS:    Imaging Personally Reviewed:  [ ] YES  [ ] NO    Consultant(s) Notes Reviewed:  [ ] YES  [ ] NO    Care Discussed with Consultants/Other Providers [ ] YES  [ ] NO

## 2024-07-22 NOTE — PROGRESS NOTE ADULT - SUBJECTIVE AND OBJECTIVE BOX
[facility]                              [address] [place] [address]                                                 PHONE: [phone]                                                 FAX: [phone]  -----------------------------------------------------------------------------------------------  Pt seen and examined. FU for CAD    Overnight events/Complaints: Pt without complains. Breathing at baseline. No chest pain. Telemetry with episodes of PAF.    RELEVANT PHYSICAL EXAM:  Neck: No obvious JVD  Cardiovascular: regular S1, S2  Respiratory: Lungs clear to auscultation; no crepitations, no wheeze  Musculoskeletal: No edema    Vital Signs Last 24 Hrs  T(C): 36.4 (22 Jul 2024 07:55), Max: 36.8 (21 Jul 2024 20:00)  T(F): 97.6 (22 Jul 2024 07:55), Max: 98.2 (21 Jul 2024 20:00)  HR: 78 (22 Jul 2024 08:00) (72 - 133)  BP: 114/52 (22 Jul 2024 08:00) (98/60 - 151/88)  BP(mean): 70 (22 Jul 2024 08:00) (70 - 111)  RR: 11 (22 Jul 2024 08:00) (11 - 27)  SpO2: 99% (22 Jul 2024 08:00) (95% - 100%)    Parameters below as of 22 Jul 2024 08:00  Patient On (Oxygen Delivery Method): room air      I&O's Summary    21 Jul 2024 07:01  -  22 Jul 2024 07:00  --------------------------------------------------------  IN: 365.5 mL / OUT: 825 mL / NET: -459.5 mL            LABS:                        12.6   7.37  )-----------( 204      ( 22 Jul 2024 04:59 )             37.4     07-22    135  |  100  |  12.9  ----------------------------<  91  3.4<L>   |  21.0<L>  |  0.75    Ca    7.9<L>      22 Jul 2024 04:59  Phos  2.9     07-21  Mg     2.1     07-21    TPro  5.8<L>  /  Alb  2.8<L>  /  TBili  0.5  /  DBili  x   /  AST  15  /  ALT  6   /  AlkPhos  58  07-21        PTT - ( 22 Jul 2024 04:59 )  PTT:88.4 sec    RADIOLOGY & ADDITIONAL STUDIES: (reviewed)  CXR was independently visualized/reviewed  and demonstrated: Stable mild pulmonary vascular congestion.  No focal consolidation      CARDIOLOGY TESTING:(reviewed)     TELEMETRY independently visualized/reviewed and demonstrated : PAF    12 lead EKG independently visualized/reviewed  and demonstrated NSR    ECHOCARDIOGRAM independently visualized/reviewed and demonstrated :    1. Technically difficult image quality.   2. Left ventricular systolic function is severely decreased with an ejection fraction of 24 % by Gudino's method of disks.   3. There is severe (grade 3) left ventricular diastolic dysfunction, with elevated left ventricular filling pressure.   4. There is increased LV mass and concentric hypertrophy.   5. Normal right ventricular cavity size and mildly reduced right ventricular systolic function.   6. The left atrium is mildly dilated.   7. Mild mitral regurgitation.   8. Mild tricuspid regurgitation.   9. Mild aortic regurgitation.  10. Moderate aortic stenosis.  11. No prior echocardiogram is available for comparison.      CARDIAC CATH:  < from: Cardiac Catheterization (07.19.24 @ 16:55) >  Procedures Performed   Procedures:              1.    Arterial Access - Right Radial     2.  Diagnostic Coronary Angiography   3.    Left Heart Cath   4.    PCI: POBA   5.    PCI: BABAK   6.    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 s/p PTCA and BABAK with  minicrush technique    LVEF 25% on echo   Moderate RCA and Ramus disease       MEDICATIONS:(reviewed)  MEDICATIONS  (STANDING):  aspirin  chewable 81 milliGRAM(s) Oral daily  atorvastatin 40 milliGRAM(s) Oral at bedtime  chlorhexidine 2% Cloths 1 Application(s) Topical daily  clopidogrel Tablet 75 milliGRAM(s) Oral daily  heparin  Infusion 900 Unit(s)/Hr (9 mL/Hr) IV Continuous <Continuous>  mexiletine 200 milliGRAM(s) Oral every 8 hours  pantoprazole    Tablet 40 milliGRAM(s) Oral before breakfast  spironolactone 25 milliGRAM(s) Oral daily  tamsulosin 0.4 milliGRAM(s) Oral at bedtime      ASSESSMENT AND PLAN:    89 year old male with a PMHx of Afib on eliquis and HFrEF (diagnosed 6 weeks ago [place] [facility]) (patient refused cath at that time) who presents from [facility] with syncopal episode (due to possible SDH however found to by hygromas and cleared for anticoagulation by neurosurgery) now found to have torsades on tele.   TTE LV EF ~ 25% see above  s/p LHC Severe stenosis of proxima/mid LAD and D1 s/p PTCA and BABAK with minicrush technique with Moderate RCA and Ramus disease   Amiodarone discontinued due to prolonged QTc    Continue ASA and Plavix  On iv heparin  On  Mexiletine 200 mg po TID   Add low dose toprol XL 12.5 mg daily  B-Blockers tomorrow if stable HD  keep K to 4, Mg to 2  Continue to monitor   Will d/w EP regarding need and timing of ICD vs. WCD pending hospital course.

## 2024-07-22 NOTE — PROGRESS NOTE ADULT - ASSESSMENT
88 y/o M with PMH of Afib, systolic cardiomyopathy [place] [facility] [place] found to have a SDH, transferred to [facility] for neurosx eval. Course c/n NSVT / Torsades, s/p Lidocaine drip and s/p LHC with BABAK x3. Currently downgraded to medicine.     Torsades  NSVT  - Telemetry monitoring  - s/p Lidocaine and amiodarone drip  - started on Mexiletine 200mg TID 7/21  - Will start toprol XL today and monitor HF  - Will c/w heparin gtt given plan for LifeVest vs defibrillator  - transfer to SDU for now  - EP following, meggan recs    Ischemic cardiomyopathy  - [place] [facility] [place] 15[place]  - s/p LHC 7/19 with BABAK x 3  - c/w DAPT  - c/w statin  - keep K to 4, Mg to 2  - c/w Aldactone   - Further GDMT per cards    Bilateral subdural hygromas/hematoma  - Neurosurgery recommendations appreciated  - Per NSG, no absolute contraindication for Eliquis given on acute hemorrhage  - No antiseizure medications recommended    Afib  - c/w Heparin drip, change to Eliquis on discharge   - to start BB per cards, likely in AM    Leukocytosis - now resolved   - Blood culture negative  - Urine culture positive for coag negative staph  - s/p Ceftriaxone x 5D    DVT ppx - Heparin drip  Dispo: Acute pending clearance by EP, cards    PT -- BIJU 89 y/o M with PMH of Afib, systolic cardiomyopathy [place] [facility] [place] found to have a SDH, transferred [facility] for [facility]sx eval. Course c/n NSVT / Torsades, s/p Lidocaine drip and s/p LHC with BABAK x3. Currently downgraded to medicine.     Torsades  NSVT  - Telemetry monitoring  - s/p Lidocaine and amiodarone drip  - started on Mexiletine 200mg TID 7/21  - Will start toprol XL today and monitor HF  - Will c/w heparin gtt given plan for LifeVest vs defibrillator  - transfer to SDU for now  - EP following, meggan recs    Ischemic cardiomyopathy  - [place] [facility] [place]  - s/p LHC 7/19 with BABAK x 3  - c/w DAPT  - c/w statin  - keep K to 4, Mg to 2  - c/w Aldactone   - Further GDMT per cards    Bilateral subdural hygromas/hematoma  - Neurosurgery recommendations appreciated  - Per NSG, no absolute contraindication for Eliquis given on acute hemorrhage  - No antiseizure medications recommended    Afib  - c/w Heparin drip, change to Eliquis on discharge   - started BB    Leukocytosis - now resolved   - Blood culture negative  - Urine culture positive for coag negative staph  - s/p Ceftriaxone x 5D    DVT ppx - Heparin drip  Dispo: Acute pending clearance by EP, cards    PT -- BIJU

## 2024-07-23 LAB
ALBUMIN SERPL ELPH-MCNC: 2.7 G/DL — LOW (ref 3.3–5.2)
ALP SERPL-CCNC: 59 U/L — SIGNIFICANT CHANGE UP (ref 40–120)
ALT FLD-CCNC: 9 U/L — SIGNIFICANT CHANGE UP
ANION GAP SERPL CALC-SCNC: 13 MMOL/L — SIGNIFICANT CHANGE UP (ref 5–17)
APTT BLD: 99.1 SEC — HIGH (ref 24.5–35.6)
AST SERPL-CCNC: 18 U/L — SIGNIFICANT CHANGE UP
BILIRUB SERPL-MCNC: 0.4 MG/DL — SIGNIFICANT CHANGE UP (ref 0.4–2)
BUN SERPL-MCNC: 11.6 MG/DL — SIGNIFICANT CHANGE UP (ref 8–20)
CALCIUM SERPL-MCNC: 8.1 MG/DL — LOW (ref 8.4–10.5)
CHLORIDE SERPL-SCNC: 103 MMOL/L — SIGNIFICANT CHANGE UP (ref 96–108)
CO2 SERPL-SCNC: 21 MMOL/L — LOW (ref 22–29)
CREAT SERPL-MCNC: 0.76 MG/DL — SIGNIFICANT CHANGE UP (ref 0.5–1.3)
CULTURE RESULTS: SIGNIFICANT CHANGE UP
EGFR: 87 ML/MIN/1.73M2 — SIGNIFICANT CHANGE UP
GLUCOSE BLDC GLUCOMTR-MCNC: 107 MG/DL — HIGH (ref 70–99)
GLUCOSE SERPL-MCNC: 86 MG/DL — SIGNIFICANT CHANGE UP (ref 70–99)
HCT VFR BLD CALC: 38 % — LOW (ref 39–50)
HGB BLD-MCNC: 12.5 G/DL — LOW (ref 13–17)
MAGNESIUM SERPL-MCNC: 1.9 MG/DL — SIGNIFICANT CHANGE UP (ref 1.6–2.6)
MCHC RBC-ENTMCNC: 29.1 PG — SIGNIFICANT CHANGE UP (ref 27–34)
MCHC RBC-ENTMCNC: 32.9 GM/DL — SIGNIFICANT CHANGE UP (ref 32–36)
MCV RBC AUTO: 88.6 FL — SIGNIFICANT CHANGE UP (ref 80–100)
PHOSPHATE SERPL-MCNC: 2.1 MG/DL — LOW (ref 2.4–4.7)
PLATELET # BLD AUTO: 195 K/UL — SIGNIFICANT CHANGE UP (ref 150–400)
POTASSIUM SERPL-MCNC: 3.5 MMOL/L — SIGNIFICANT CHANGE UP (ref 3.5–5.3)
POTASSIUM SERPL-SCNC: 3.5 MMOL/L — SIGNIFICANT CHANGE UP (ref 3.5–5.3)
PROT SERPL-MCNC: 5.7 G/DL — LOW (ref 6.6–8.7)
RBC # BLD: 4.29 M/UL — SIGNIFICANT CHANGE UP (ref 4.2–5.8)
RBC # FLD: 15.4 % — HIGH (ref 10.3–14.5)
SODIUM SERPL-SCNC: 137 MMOL/L — SIGNIFICANT CHANGE UP (ref 135–145)
SPECIMEN SOURCE: SIGNIFICANT CHANGE UP
WBC # BLD: 7.98 K/UL — SIGNIFICANT CHANGE UP (ref 3.8–10.5)
WBC # FLD AUTO: 7.98 K/UL — SIGNIFICANT CHANGE UP (ref 3.8–10.5)

## 2024-07-23 PROCEDURE — 99232 SBSQ HOSP IP/OBS MODERATE 35: CPT

## 2024-07-23 RX ORDER — METOPROLOL TARTRATE 100 MG
25 TABLET ORAL DAILY
Refills: 0 | Status: DISCONTINUED | OUTPATIENT
Start: 2024-07-23 | End: 2024-07-28

## 2024-07-23 RX ORDER — APIXABAN 5 MG/1
2.5 TABLET, FILM COATED ORAL EVERY 12 HOURS
Refills: 0 | Status: DISCONTINUED | OUTPATIENT
Start: 2024-07-23 | End: 2024-07-30

## 2024-07-23 RX ORDER — LOSARTAN POTASSIUM 50 MG/1
25 TABLET, FILM COATED ORAL DAILY
Refills: 0 | Status: DISCONTINUED | OUTPATIENT
Start: 2024-07-23 | End: 2024-07-30

## 2024-07-23 RX ORDER — MAGNESIUM SULFATE 500 MG/ML
1 VIAL (ML) INJECTION ONCE
Refills: 0 | Status: COMPLETED | OUTPATIENT
Start: 2024-07-23 | End: 2024-07-23

## 2024-07-23 RX ORDER — POTASSIUM CHLORIDE 1500 MG/1
40 TABLET, EXTENDED RELEASE ORAL EVERY 4 HOURS
Refills: 0 | Status: COMPLETED | OUTPATIENT
Start: 2024-07-23 | End: 2024-07-23

## 2024-07-23 RX ADMIN — APIXABAN 2.5 MILLIGRAM(S): 5 TABLET, FILM COATED ORAL at 11:10

## 2024-07-23 RX ADMIN — MEXILETINE HYDROCHLORIDE 200 MILLIGRAM(S): 200 CAPSULE ORAL at 21:54

## 2024-07-23 RX ADMIN — Medication 25 MILLIGRAM(S): at 12:15

## 2024-07-23 RX ADMIN — APIXABAN 2.5 MILLIGRAM(S): 5 TABLET, FILM COATED ORAL at 21:53

## 2024-07-23 RX ADMIN — CLOPIDOGREL BISULFATE 75 MILLIGRAM(S): 75 TABLET, FILM COATED ORAL at 11:10

## 2024-07-23 RX ADMIN — ATORVASTATIN CALCIUM 40 MILLIGRAM(S): 40 TABLET, FILM COATED ORAL at 21:54

## 2024-07-23 RX ADMIN — LOSARTAN POTASSIUM 25 MILLIGRAM(S): 50 TABLET, FILM COATED ORAL at 13:51

## 2024-07-23 RX ADMIN — SPIRONOLACTONE 25 MILLIGRAM(S): 50 TABLET, FILM COATED ORAL at 05:53

## 2024-07-23 RX ADMIN — POTASSIUM CHLORIDE 40 MILLIEQUIVALENT(S): 1500 TABLET, EXTENDED RELEASE ORAL at 13:51

## 2024-07-23 RX ADMIN — MEXILETINE HYDROCHLORIDE 200 MILLIGRAM(S): 200 CAPSULE ORAL at 13:51

## 2024-07-23 RX ADMIN — CHLORHEXIDINE GLUCONATE 1 APPLICATION(S): 500 CLOTH TOPICAL at 11:10

## 2024-07-23 RX ADMIN — MEXILETINE HYDROCHLORIDE 200 MILLIGRAM(S): 200 CAPSULE ORAL at 05:54

## 2024-07-23 RX ADMIN — Medication 100 GRAM(S): at 08:53

## 2024-07-23 RX ADMIN — Medication 0.4 MILLIGRAM(S): at 21:53

## 2024-07-23 RX ADMIN — Medication 12.5 MILLIGRAM(S): at 05:54

## 2024-07-23 RX ADMIN — Medication 81 MILLIGRAM(S): at 11:10

## 2024-07-23 RX ADMIN — PANTOPRAZOLE SODIUM 40 MILLIGRAM(S): 20 TABLET, DELAYED RELEASE ORAL at 08:55

## 2024-07-23 RX ADMIN — POTASSIUM CHLORIDE 40 MILLIEQUIVALENT(S): 1500 TABLET, EXTENDED RELEASE ORAL at 08:54

## 2024-07-23 NOTE — SWALLOW BEDSIDE ASSESSMENT ADULT - SWALLOW EVAL: DIAGNOSIS
Oral & pharyngeal stage of swallow clinically judged to be WFL for assessed trials with NO overt s/s aspiration noted post intake

## 2024-07-23 NOTE — PROGRESS NOTE ADULT - SUBJECTIVE AND OBJECTIVE BOX
Patient is a 89y old  Male who presents with a chief complaint of AFIB with RVR with syncope and collapse (23 Jul 2024 09:43)    INTERVAL HPI/OVERNIGHT EVENTS: No acute events overnight. HD stable.     MEDICATIONS  (STANDING):  apixaban 2.5 milliGRAM(s) Oral every 12 hours  aspirin  chewable 81 milliGRAM(s) Oral daily  atorvastatin 40 milliGRAM(s) Oral at bedtime  chlorhexidine 2% Cloths 1 Application(s) Topical daily  clopidogrel Tablet 75 milliGRAM(s) Oral daily  losartan 25 milliGRAM(s) Oral daily  metoprolol succinate ER 25 milliGRAM(s) Oral daily  mexiletine 200 milliGRAM(s) Oral every 8 hours  pantoprazole    Tablet 40 milliGRAM(s) Oral before breakfast  potassium chloride   Powder 40 milliEquivalent(s) Oral every 4 hours  spironolactone 25 milliGRAM(s) Oral daily  tamsulosin 0.4 milliGRAM(s) Oral at bedtime    MEDICATIONS  (PRN):      Allergies    penicillins (Unknown)    Intolerances        REVIEW OF SYSTEMS: all negative with exception of above    Vital Signs Last 24 Hrs  T(C): 36.8 (23 Jul 2024 08:00), Max: 37.3 (22 Jul 2024 19:48)  T(F): 98.2 (23 Jul 2024 08:00), Max: 99.1 (22 Jul 2024 19:48)  HR: 76 (23 Jul 2024 12:00) (63 - 82)  BP: 115/81 (23 Jul 2024 12:00) (97/59 - 140/60)  BP(mean): 90 (23 Jul 2024 12:00) (64 - 100)  RR: 17 (23 Jul 2024 12:00) (14 - 22)  SpO2: 98% (23 Jul 2024 12:00) (95% - 99%)    Parameters below as of 23 Jul 2024 12:00  Patient On (Oxygen Delivery Method): room air        PHYSICAL EXAM:  CONSTITUTIONAL: NAD, well-groomed  RESPIRATORY: Normal respiratory effort; B/l diminished bs b/l  CARDIOVASCULAR: Regular rate, no LE edema  ABDOMEN: Nontender to palpation, normoactive bowel sounds  PSYCH: A+O x3; affect appropriate    LABS:                        12.5   7.98  )-----------( 195      ( 23 Jul 2024 05:19 )             38.0     07-23    137  |  103  |  11.6  ----------------------------<  86  3.5   |  21.0<L>  |  0.76    Ca    8.1<L>      23 Jul 2024 05:19  Phos  2.1     07-23  Mg     1.9     07-23    TPro  5.7<L>  /  Alb  2.7<L>  /  TBili  0.4  /  DBili  x   /  AST  18  /  ALT  9   /  AlkPhos  59  07-23    PTT - ( 23 Jul 2024 05:19 )  PTT:99.1 sec  Urinalysis Basic - ( 23 Jul 2024 05:19 )    Color: x / Appearance: x / SG: x / pH: x  Gluc: 86 mg/dL / Ketone: x  / Bili: x / Urobili: x   Blood: x / Protein: x / Nitrite: x   Leuk Esterase: x / RBC: x / WBC x   Sq Epi: x / Non Sq Epi: x / Bacteria: x      CAPILLARY BLOOD GLUCOSE          RADIOLOGY & ADDITIONAL TESTS:    Imaging Personally Reviewed:  [ ] YES  [ ] NO    Consultant(s) Notes Reviewed:  [ ] YES  [ ] NO    Care Discussed with Consultants/Other Providers [ ] YES  [ ] NO

## 2024-07-23 NOTE — SWALLOW BEDSIDE ASSESSMENT ADULT - COMMENTS
As per MD note: "88 y/o M with PMH of Afib, systolic cardiomyopathy presented [place] [facility] with [place]ope found to have a SDH, transferred to [facility] for neurosx eval. Course c/n NSVT / Torsades, s/p Lidocaine drip and s/p LHC with BABAK x3"

## 2024-07-23 NOTE — PROGRESS NOTE ADULT - SUBJECTIVE AND OBJECTIVE BOX
[facility]                              [address] [place] [address]                                                 PHONE: [phone]                                                 FAX: [phone]  -----------------------------------------------------------------------------------------------  Pt seen and examined. FU for CAD    Overnight events/Complaints: Pt without complains. Breathing at baseline. No chest pain. Telemetry with episodes of PAF.    RELEVANT PHYSICAL EXAM:  Neck: No obvious JVD  Cardiovascular: regular S1, S2  Respiratory: Lungs clear to auscultation; no crepitations, no wheeze  Musculoskeletal: No edema    Vital Signs Last 24 Hrs  T(C): 36.8 (23 Jul 2024 08:00), Max: 37.3 (22 Jul 2024 19:48)  T(F): 98.2 (23 Jul 2024 08:00), Max: 99.1 (22 Jul 2024 19:48)  HR: 73 (23 Jul 2024 08:00) (66 - 82)  BP: 110/48 (23 Jul 2024 08:00) (97/59 - 140/60)  BP(mean): 64 (23 Jul 2024 08:00) (64 - 100)  RR: 18 (23 Jul 2024 08:00) (14 - 22)  SpO2: 95% (23 Jul 2024 08:00) (95% - 99%)    Parameters below as of 23 Jul 2024 08:00  Patient On (Oxygen Delivery Method): room air      I&O's Summary    22 Jul 2024 07:01  -  23 Jul 2024 07:00  --------------------------------------------------------  IN: 117 mL / OUT: 650 mL / NET: -533 mL    23 Jul 2024 07:01  -  23 Jul 2024 09:43  --------------------------------------------------------  IN: 9 mL / OUT: 275 mL / NET: -266 mL            LABS:                        12.5   7.98  )-----------( 195      ( 23 Jul 2024 05:19 )             38.0     07-23    137  |  103  |  11.6  ----------------------------<  86  3.5   |  21.0<L>  |  0.76    Ca    8.1<L>      23 Jul 2024 05:19  Phos  2.1     07-23  Mg     1.9     07-23    TPro  5.7<L>  /  Alb  2.7<L>  /  TBili  0.4  /  DBili  x   /  AST  18  /  ALT  9   /  AlkPhos  59  07-23        PTT - ( 23 Jul 2024 05:19 )  PTT:99.1 sec          RADIOLOGY & ADDITIONAL STUDIES: (reviewed)  CXR was independently visualized/reviewed  and demonstrated: Stable mild pulmonary vascular congestion.  No focal consolidation      CARDIOLOGY TESTING:(reviewed)     TELEMETRY independently visualized/reviewed and demonstrated : PAF    12 lead EKG independently visualized/reviewed  and demonstrated NSR    ECHOCARDIOGRAM independently visualized/reviewed and demonstrated :    1. Technically difficult image quality.   2. Left ventricular systolic function is severely decreased with an ejection fraction of 24 % by Gudino's method of disks.   3. There is severe (grade 3) left ventricular diastolic dysfunction, with elevated left ventricular filling pressure.   4. There is increased LV mass and concentric hypertrophy.   5. Normal right ventricular cavity size and mildly reduced right ventricular systolic function.   6. The left atrium is mildly dilated.   7. Mild mitral regurgitation.   8. Mild tricuspid regurgitation.   9. Mild aortic regurgitation.  10. Moderate aortic stenosis.  11. No prior echocardiogram is available for comparison.      CARDIAC CATH:  < from: Cardiac Catheterization (07.19.24 @ 16:55) >  Procedures Performed   Procedures:              1.    Arterial Access - Right Radial     2.  Diagnostic Coronary Angiography   3.    Left Heart Cath   4.    PCI: POBA   5.    PCI: BABAK   6.    PCI: ABBAK     Indications:               Torsades de pointes     Conclusions:   Severe stenosis of proxima/mid LAD and D1 s/p PTCA and BABAK with  minicrush technique    LVEF 25% on echo   Moderate RCA and Ramus disease       MEDICATIONS:(reviewed)  MEDICATIONS  (STANDING):  aspirin  chewable 81 milliGRAM(s) Oral daily  atorvastatin 40 milliGRAM(s) Oral at bedtime  chlorhexidine 2% Cloths 1 Application(s) Topical daily  clopidogrel Tablet 75 milliGRAM(s) Oral daily  heparin  Infusion 900 Unit(s)/Hr (9 mL/Hr) IV Continuous <Continuous>  metoprolol succinate ER 12.5 milliGRAM(s) Oral daily  mexiletine 200 milliGRAM(s) Oral every 8 hours  pantoprazole    Tablet 40 milliGRAM(s) Oral before breakfast  potassium chloride   Powder 40 milliEquivalent(s) Oral every 4 hours  spironolactone 25 milliGRAM(s) Oral daily  tamsulosin 0.4 milliGRAM(s) Oral at bedtime    ASSESSMENT AND PLAN:    89 year old male with a PMHx of Afib on eliquis and HFrEF (diagn[place] 6 weeks ago [place] [facility] [place] (patient refused cath at that time) who [place] from [facility] with syncopal episode (due to possible SDH however found to by hygromas and cleared for anticoagulation by neurosurgery) now found to have torsades on tele.   TTE LV EF ~ 25% see above  s/p LHC Severe stenosis of proxima/mid LAD and D1 s/p PTCA and BABAK with minicrush technique with Moderate RCA and Ramus disease   Amiodarone discontinued due to prolonged QTc    Continue ASA and Plavix  On  Mexiletine 200 mg po TID   Increase toprol XL to 25 mg daily and add low dose losartan and continue aldactone for CMP  keep K to 4, Mg to 2  Continue to monitor   As per EP note, pt declines ICD  On iv heparin. Can switch to Eliquis 2.5 mg BID and discontinue iv heparin as pt declines ICD  Offered pt WCD prior to discharge to assess if CMP will improve as pt back in NSR and post PCI of LAD. Pt agreeable to WCD. Will arrange prior to discharge

## 2024-07-23 NOTE — CHART NOTE - NSCHARTNOTEFT_GEN_A_CORE
Notified by RN for patient with h/o torsades during this admission, potassium 3.5 this morning   Patient asymptomatic, VSS, and NAD; no events on monitor     LABS              12.5   7.98  )-----------( 195      ( 07-23-24 @ 05:19 )             38.0     137  |  103  |  11.6  -------------------------<  86   07-23-24 @ 05:19  3.5  |  21.0  |  0.76    Ca      8.1     07-23-24 @ 05:19  Phos   2.1     07-23-24 @ 05:19  Mg     1.9     07-23-24 @ 05:19    TPro  5.7  /  Alb  2.7  /  TBili  0.4  /  DBili  x   /  AST  18  /  ALT  9   /  AlkPhos  59  /  GGT  x     07-23-24 @ 05:19    PTT - ( 07-23-24 @ 05:19 )  PTT:99.1 sec    Electrolyte imbalance   -serum potassium 3.5 this AM    ordered potassium powder 40meq q4hrs x2 doses    goal serum potassium >4   -serum magnesium 1.9 this AM    ordered magnesium 1g IVPB x1 stat    goal serum magnesium >2    Continue to monitor   RN to notify provider with any changes in patient status

## 2024-07-23 NOTE — SWALLOW BEDSIDE ASSESSMENT ADULT - SLP GENERAL OBSERVATIONS
Pt received & seen seated upright in bed, awake/alert, reduced cognition, pleasant/cooperative, 0/10 pain pre/post

## 2024-07-23 NOTE — PROGRESS NOTE ADULT - ASSESSMENT
88 y/o M with PMH of Afib, systolic cardiomyopathy presented to [facility] with syncope found to have a SDH, transferred to [facility] for neurosx eval. Course c/n NSVT / Torsades, s/p Lidocaine drip and s/p LHC with BABAK x3. Currently downgraded to medicine.     Torsades  NSVT  - Telemetry monitoring  - s/p Lidocaine and amiodarone drip  - started on Mexiletine 200mg TID 7/21  - Appreciate cardiology recs- will increase toprol and added losartan  - Switched to eliquis and need LifeVest prior to discharge  - transfer to SDU for now  - EP following, meggan recs    Ischemic cardiomyopathy  - TTE from [facility] with EF 15%  - s/p LHC 7/19 with BABAK x 3  - c/w DAPT  - c/w statin  - keep K to 4, Mg to 2  - c/w Aldactone   - Further GDMT per cards    Bilateral subdural hygromas/hematoma  - Neurosurgery recommendations appreciated  - Per NSG, no absolute contraindication for Eliquis given on acute hemorrhage  - No antiseizure medications recommended    Afib  - Eliquis  - started BB    Leukocytosis - now resolved   - Blood culture negative  - Urine culture positive for coag negative staph  - s/p Ceftriaxone x 5D    DVT ppx - Eliquis  Dispo: Pending LifeVest. IRMA started today 7/23    PT- BIJU

## 2024-07-24 LAB
ALBUMIN SERPL ELPH-MCNC: 2.7 G/DL — LOW (ref 3.3–5.2)
ALP SERPL-CCNC: 60 U/L — SIGNIFICANT CHANGE UP (ref 40–120)
ALT FLD-CCNC: 14 U/L — SIGNIFICANT CHANGE UP
ANION GAP SERPL CALC-SCNC: 11 MMOL/L — SIGNIFICANT CHANGE UP (ref 5–17)
AST SERPL-CCNC: 28 U/L — SIGNIFICANT CHANGE UP
BILIRUB SERPL-MCNC: 0.5 MG/DL — SIGNIFICANT CHANGE UP (ref 0.4–2)
BUN SERPL-MCNC: 15 MG/DL — SIGNIFICANT CHANGE UP (ref 8–20)
CALCIUM SERPL-MCNC: 8.2 MG/DL — LOW (ref 8.4–10.5)
CHLORIDE SERPL-SCNC: 104 MMOL/L — SIGNIFICANT CHANGE UP (ref 96–108)
CO2 SERPL-SCNC: 22 MMOL/L — SIGNIFICANT CHANGE UP (ref 22–29)
CREAT SERPL-MCNC: 0.74 MG/DL — SIGNIFICANT CHANGE UP (ref 0.5–1.3)
EGFR: 88 ML/MIN/1.73M2 — SIGNIFICANT CHANGE UP
GLUCOSE SERPL-MCNC: 79 MG/DL — SIGNIFICANT CHANGE UP (ref 70–99)
HCT VFR BLD CALC: 35.7 % — LOW (ref 39–50)
HGB BLD-MCNC: 11.8 G/DL — LOW (ref 13–17)
MCHC RBC-ENTMCNC: 29.6 PG — SIGNIFICANT CHANGE UP (ref 27–34)
MCHC RBC-ENTMCNC: 33.1 GM/DL — SIGNIFICANT CHANGE UP (ref 32–36)
MCV RBC AUTO: 89.7 FL — SIGNIFICANT CHANGE UP (ref 80–100)
PLATELET # BLD AUTO: 201 K/UL — SIGNIFICANT CHANGE UP (ref 150–400)
POTASSIUM SERPL-MCNC: 4 MMOL/L — SIGNIFICANT CHANGE UP (ref 3.5–5.3)
POTASSIUM SERPL-SCNC: 4 MMOL/L — SIGNIFICANT CHANGE UP (ref 3.5–5.3)
PROT SERPL-MCNC: 5.5 G/DL — LOW (ref 6.6–8.7)
RBC # BLD: 3.98 M/UL — LOW (ref 4.2–5.8)
RBC # FLD: 15.3 % — HIGH (ref 10.3–14.5)
SODIUM SERPL-SCNC: 137 MMOL/L — SIGNIFICANT CHANGE UP (ref 135–145)
WBC # BLD: 8.11 K/UL — SIGNIFICANT CHANGE UP (ref 3.8–10.5)
WBC # FLD AUTO: 8.11 K/UL — SIGNIFICANT CHANGE UP (ref 3.8–10.5)

## 2024-07-24 PROCEDURE — 99232 SBSQ HOSP IP/OBS MODERATE 35: CPT

## 2024-07-24 RX ADMIN — CHLORHEXIDINE GLUCONATE 1 APPLICATION(S): 500 CLOTH TOPICAL at 10:56

## 2024-07-24 RX ADMIN — ATORVASTATIN CALCIUM 40 MILLIGRAM(S): 40 TABLET, FILM COATED ORAL at 21:03

## 2024-07-24 RX ADMIN — PANTOPRAZOLE SODIUM 40 MILLIGRAM(S): 20 TABLET, DELAYED RELEASE ORAL at 05:56

## 2024-07-24 RX ADMIN — MEXILETINE HYDROCHLORIDE 200 MILLIGRAM(S): 200 CAPSULE ORAL at 05:56

## 2024-07-24 RX ADMIN — CLOPIDOGREL BISULFATE 75 MILLIGRAM(S): 75 TABLET, FILM COATED ORAL at 10:56

## 2024-07-24 RX ADMIN — SPIRONOLACTONE 25 MILLIGRAM(S): 50 TABLET, FILM COATED ORAL at 05:56

## 2024-07-24 RX ADMIN — APIXABAN 2.5 MILLIGRAM(S): 5 TABLET, FILM COATED ORAL at 21:04

## 2024-07-24 RX ADMIN — Medication 0.4 MILLIGRAM(S): at 21:04

## 2024-07-24 RX ADMIN — APIXABAN 2.5 MILLIGRAM(S): 5 TABLET, FILM COATED ORAL at 10:55

## 2024-07-24 RX ADMIN — Medication 81 MILLIGRAM(S): at 10:56

## 2024-07-24 RX ADMIN — MEXILETINE HYDROCHLORIDE 200 MILLIGRAM(S): 200 CAPSULE ORAL at 21:04

## 2024-07-24 RX ADMIN — LOSARTAN POTASSIUM 25 MILLIGRAM(S): 50 TABLET, FILM COATED ORAL at 05:56

## 2024-07-24 RX ADMIN — Medication 25 MILLIGRAM(S): at 05:56

## 2024-07-24 RX ADMIN — MEXILETINE HYDROCHLORIDE 200 MILLIGRAM(S): 200 CAPSULE ORAL at 13:33

## 2024-07-24 NOTE — PROVIDER CONTACT NOTE (OTHER) - ASSESSMENT
patient stated at 1900 I want to change my code status to full code, I want to go leave with a lifevest as cardio recommends.

## 2024-07-24 NOTE — PROGRESS NOTE ADULT - SUBJECTIVE AND OBJECTIVE BOX
FER JOHNSON    [id number]    89y      Male    CC: afib     INTERVAL HPI/OVERNIGHT EVENTS: Pt seen and examined. no acute events reported o/n     REVIEW OF SYSTEMS:    CONSTITUTIONAL: No weight loss  RESPIRATORY: No cough, wheezing, hemoptysis  CARDIOVASCULAR: No chest pain, palpitations  GASTROINTESTINAL: No abdominal or epigastric pain. No nausea, vomiting  NEUROLOGICAL: No headaches    Vital Signs Last 24 Hrs  T(C): 36.4 (24 Jul 2024 12:23), Max: 37 (23 Jul 2024 16:05)  T(F): 97.5 (24 Jul 2024 12:23), Max: 98.6 (23 Jul 2024 16:05)  HR: 65 (24 Jul 2024 12:23) (54 - 74)  BP: 122/78 (24 Jul 2024 12:23) (115/47 - 146/50)  BP(mean): 79 (24 Jul 2024 12:00) (68 - 82)  RR: 15 (24 Jul 2024 12:23) (14 - 18)  SpO2: 98% (24 Jul 2024 12:23) (94% - 98%)    Parameters below as of 24 Jul 2024 12:23  Patient On (Oxygen Delivery Method): room air        PHYSICAL EXAM:    GENERAL: NAD  CHEST/LUNG: Clear to auscultation bilaterally  HEART: S1S2+, Regular rate and rhythm  ABDOMEN: Soft, Nontender, Nondistended; Bowel sounds present  SKIN: warm, dry   NEURO: AAOX3  PSYCH: calm ,cooperative     LABS:                        11.8   8.11  )-----------( 201      ( 24 Jul 2024 05:31 )             35.7     07-24    137  |  104  |  15.0  ----------------------------<  79  4.0   |  22.0  |  0.74    Ca    8.2<L>      24 Jul 2024 05:31  Phos  2.1     07-23  Mg     1.9     07-23    TPro  5.5<L>  /  Alb  2.7<L>  /  TBili  0.5  /  DBili  x   /  AST  28  /  ALT  14  /  AlkPhos  60  07-24    PTT - ( 23 Jul 2024 05:19 )  PTT:99.1 sec  Urinalysis Basic - ( 24 Jul 2024 05:31 )    Color: x / Appearance: x / SG: x / pH: x  Gluc: 79 mg/dL / Ketone: x  / Bili: x / Urobili: x   Blood: x / Protein: x / Nitrite: x   Leuk Esterase: x / RBC: x / WBC x   Sq Epi: x / Non Sq Epi: x / Bacteria: x          MEDICATIONS  (STANDING):  apixaban 2.5 milliGRAM(s) Oral every 12 hours  aspirin  chewable 81 milliGRAM(s) Oral daily  atorvastatin 40 milliGRAM(s) Oral at bedtime  chlorhexidine 2% Cloths 1 Application(s) Topical daily  clopidogrel Tablet 75 milliGRAM(s) Oral daily  losartan 25 milliGRAM(s) Oral daily  metoprolol succinate ER 25 milliGRAM(s) Oral daily  mexiletine 200 milliGRAM(s) Oral every 8 hours  pantoprazole    Tablet 40 milliGRAM(s) Oral before breakfast  spironolactone 25 milliGRAM(s) Oral daily  tamsulosin 0.4 milliGRAM(s) Oral at bedtime    MEDICATIONS  (PRN):      RADIOLOGY & ADDITIONAL TESTS:

## 2024-07-24 NOTE — CHART NOTE - NSCHARTNOTEFT_GEN_A_CORE
PA NOTE-MEDICINE    Called by RN due to Pt wanting to rescind his DNR/I.  Spoke with Pt at length about wishes.  Pt expressed he wants CPR and ti be Intubated if needed,.  Pt is A @ O x 4  Answers questions appropriately, Follows commands.  Coherent.  Conversation witnessed by Co-worker Chrissy HEALY.  DNR/I ordered DC'd  Molst on Chart discarded  Will sign out to AM Team

## 2024-07-24 NOTE — PROGRESS NOTE ADULT - SUBJECTIVE AND OBJECTIVE BOX
[place] [facility] - [facility], THE [facility]                                   [address] [place] [address]                                                      PHONE: [phone]                                                         FAX: [phone]  [email or web address]  ---------------------------------------------------------------------------------------------------------------------------------    Overnight events/patient complaints:      NAD feeling well today     penicillins (Unknown)    MEDICATIONS  (STANDING):  apixaban 2.5 milliGRAM(s) Oral every 12 hours  aspirin  chewable 81 milliGRAM(s) Oral daily  atorvastatin 40 milliGRAM(s) Oral at bedtime  chlorhexidine 2% Cloths 1 Application(s) Topical daily  clopidogrel Tablet 75 milliGRAM(s) Oral daily  losartan 25 milliGRAM(s) Oral daily  metoprolol succinate ER 25 milliGRAM(s) Oral daily  mexiletine 200 milliGRAM(s) Oral every 8 hours  pantoprazole    Tablet 40 milliGRAM(s) Oral before breakfast  spironolactone 25 milliGRAM(s) Oral daily  tamsulosin 0.4 milliGRAM(s) Oral at bedtime    MEDICATIONS  (PRN):      Vital Signs Last 24 Hrs  T(C): 36.4 (24 Jul 2024 08:00), Max: 37 (23 Jul 2024 16:05)  T(F): 97.5 (24 Jul 2024 08:00), Max: 98.6 (23 Jul 2024 16:05)  HR: 54 (24 Jul 2024 08:00) (54 - 78)  BP: 115/47 (24 Jul 2024 08:00) (115/47 - 146/50)  BP(mean): 68 (24 Jul 2024 08:00) (68 - 90)  RR: 14 (24 Jul 2024 08:00) (14 - 18)  SpO2: 94% (24 Jul 2024 08:00) (94% - 98%)    Parameters below as of 24 Jul 2024 08:00  Patient On (Oxygen Delivery Method): room air      ICU Vital Signs Last 24 Hrs  FER JOHNSON  I&O's Detail    23 Jul 2024 07:01  -  24 Jul 2024 07:00  --------------------------------------------------------  IN:    Heparin: 27 mL    IV PiggyBack: 100 mL    Oral Fluid: 690 mL  Total IN: 817 mL    OUT:    Intermittent Catheterization - Urethral (mL): 350 mL    Voided (mL): 975 mL  Total OUT: 1325 mL    Total NET: -508 mL        I&O's Summary    23 Jul 2024 07:01  -  24 Jul 2024 07:00  --------------------------------------------------------  IN: 817 mL / OUT: 1325 mL / NET: -508 mL      Drug Dosing Weight  FER JOHNSON      PHYSICAL EXAM:  General: Appears well developed, alert and cooperative.  HEENT: Head; normocephalic, atraumatic.  Eyes: Pupils reactive, cornea wnl.  Neck: Supple, no nodes adenopathy, no NVD or carotid bruit or thyromegaly.  CARDIOVASCULAR: Normal S1 and S2, No murmur, rub, gallop or lift.   LUNGS: No rales, rhonchi or wheeze. Normal breath sounds bilaterally.  ABDOMEN: Soft, nontender without mass or organomegaly. bowel sounds normoactive.  EXTREMITIES: No clubbing, cyanosis or edema. Distal pulses wnl.   SKIN: warm and dry with normal turgor.  NEURO: Alert/oriented x 3/normal motor exam. No pathologic reflexes.    PSYCH: normal affect.        LABS:                        11.8   8.11  )-----------( 201      ( 24 Jul 2024 05:31 )             35.7     07-24    137  |  104  |  15.0  ----------------------------<  79  4.0   |  22.0  |  0.74    Ca    8.2<L>      24 Jul 2024 05:31  Phos  2.1     07-23  Mg     1.9     07-23    TPro  5.5<L>  /  Alb  2.7<L>  /  TBili  0.5  /  DBili  x   /  AST  28  /  ALT  14  /  AlkPhos  60  07-24    FER JOHNSON      PTT - ( 23 Jul 2024 05:19 )  PTT:99.1 sec  Urinalysis Basic - ( 24 Jul 2024 05:31 )    Color: x / Appearance: x / SG: x / pH: x  Gluc: 79 mg/dL / Ketone: x  / Bili: x / Urobili: x   Blood: x / Protein: x / Nitrite: x   Leuk Esterase: x / RBC: x / WBC x   Sq Epi: x / Non Sq Epi: x / Bacteria: x        RADIOLOGY & ADDITIONAL STUDIES:    INTERPRETATION OF TELEMETRY (personally reviewed):        89 year old male with a PMHx of Afib on eliquis and HFrEF (diagnosed 6 weeks ago at [facility]) (patient refused cath at that time) who presents from [facility] with syncopal episode (due to possible SDH however found to by hygromas and cleared for anticoagulation by neurosurgery) now found to have torsades on tele.     TTE LV EF ~ 25% see above    s/p LHC Severe stenosis of proxima/mid LAD and D1 s/p PTCA and BABAK with minicrush technique with Moderate RCA and Ramus disease               Plan  Continue Mexiletine 200 mg po TID   Continue current optimal medical therapy   statin and DAPT therapy   Heparin gtt and change to NOAC   keep K to 4, Mg to 2  Continue to monitor   Refusing AICD and life vest   Repeat limited ECHO to re-evaluate LV EF

## 2024-07-24 NOTE — PROVIDER CONTACT NOTE (OTHER) - BACKGROUND
cardiology has been wanting to discharge him with an aicd or a lifevest before discharge,   in the notes it does say he may need to change his code status if he is agreeable.
Admitted with SDH.

## 2024-07-24 NOTE — PROGRESS NOTE ADULT - ASSESSMENT
89y/oM with known Afib and systolic cardiomyopathy presented initially presenting to [facility] after syncope and collapse and fall, found to have a SDH and transferred to [facility] 7/16 for neurosx evaluation. No intervention indicated and cleared for Eliquis. Hospital course c/b RRT for Torsades that did not respond to Amiodarone. Transferred to the ICU for Lidocaine drip and close monitoring. briefly on pressors and then weaned off. Now s/p LHC, with 80% stenosis proximal LAD s/p 2 BABAK and 95% stenosis of the diagonal artery s/p 1 BABAK. Now weaned off Lidocaine drip and started on Mexiletine. downgraded to medicine 7/21    Torsades  NSVT  -cont  Telemetry monitoring  -s/p Lidocaine and amiodarone drip  -started on Mexiletine 200mg TID 7/21  -cardio recs appreciated; d/w Dr. Tran   -EP recs appreciated  -pt reportedly refusing AICD and life vest   -cont toprol and added losartan  -cont eliquis   -f/u repeat TTE today 7/24;    Ischemic cardiomyopathy  -TTE from [facility] with EF 15%  - s/p LHC 7/19 with BABAK x 3  - c/w DAPT  - c/w statin  - keep K to 4, Mg to 2  - c/w Aldactone   - Further GDMT per cards    Bilateral subdural hygromas/hematoma  - Neurosurgery recommendations appreciated  - Per NSG, no absolute contraindication for Eliquis given on acute hemorrhage  - No antiseizure medications recommended    Afib  - Eliquis, metoprolol    Leukocytosis - now resolved   - Blood culture negative  - Urine culture positive for coag negative staph  - s/p Ceftriaxone x 5D    vte ppx - Eliquis    PT- rec BIJU    dispo: BIJU when medically stable, poss 1-2 days

## 2024-07-25 ENCOUNTER — RESULT REVIEW (OUTPATIENT)
Age: 89
End: 2024-07-25

## 2024-07-25 LAB
ANION GAP SERPL CALC-SCNC: 15 MMOL/L — SIGNIFICANT CHANGE UP (ref 5–17)
BUN SERPL-MCNC: 13.9 MG/DL — SIGNIFICANT CHANGE UP (ref 8–20)
CALCIUM SERPL-MCNC: 8.1 MG/DL — LOW (ref 8.4–10.5)
CHLORIDE SERPL-SCNC: 101 MMOL/L — SIGNIFICANT CHANGE UP (ref 96–108)
CO2 SERPL-SCNC: 20 MMOL/L — LOW (ref 22–29)
CREAT SERPL-MCNC: 0.68 MG/DL — SIGNIFICANT CHANGE UP (ref 0.5–1.3)
EGFR: 90 ML/MIN/1.73M2 — SIGNIFICANT CHANGE UP
GLUCOSE SERPL-MCNC: 60 MG/DL — LOW (ref 70–99)
HCT VFR BLD CALC: 37.1 % — LOW (ref 39–50)
HGB BLD-MCNC: 12.6 G/DL — LOW (ref 13–17)
MAGNESIUM SERPL-MCNC: 1.9 MG/DL — SIGNIFICANT CHANGE UP (ref 1.6–2.6)
MCHC RBC-ENTMCNC: 30 PG — SIGNIFICANT CHANGE UP (ref 27–34)
MCHC RBC-ENTMCNC: 34 GM/DL — SIGNIFICANT CHANGE UP (ref 32–36)
MCV RBC AUTO: 88.3 FL — SIGNIFICANT CHANGE UP (ref 80–100)
PLATELET # BLD AUTO: 218 K/UL — SIGNIFICANT CHANGE UP (ref 150–400)
POTASSIUM SERPL-MCNC: 4 MMOL/L — SIGNIFICANT CHANGE UP (ref 3.5–5.3)
POTASSIUM SERPL-SCNC: 4 MMOL/L — SIGNIFICANT CHANGE UP (ref 3.5–5.3)
RBC # BLD: 4.2 M/UL — SIGNIFICANT CHANGE UP (ref 4.2–5.8)
RBC # FLD: 15.2 % — HIGH (ref 10.3–14.5)
SODIUM SERPL-SCNC: 136 MMOL/L — SIGNIFICANT CHANGE UP (ref 135–145)
WBC # BLD: 8.21 K/UL — SIGNIFICANT CHANGE UP (ref 3.8–10.5)
WBC # FLD AUTO: 8.21 K/UL — SIGNIFICANT CHANGE UP (ref 3.8–10.5)

## 2024-07-25 PROCEDURE — 93308 TTE F-UP OR LMTD: CPT | Mod: 26

## 2024-07-25 PROCEDURE — 99232 SBSQ HOSP IP/OBS MODERATE 35: CPT

## 2024-07-25 PROCEDURE — 93325 DOPPLER ECHO COLOR FLOW MAPG: CPT

## 2024-07-25 PROCEDURE — 93321 DOPPLER ECHO F-UP/LMTD STD: CPT | Mod: 26

## 2024-07-25 RX ADMIN — CLOPIDOGREL BISULFATE 75 MILLIGRAM(S): 75 TABLET, FILM COATED ORAL at 11:54

## 2024-07-25 RX ADMIN — APIXABAN 2.5 MILLIGRAM(S): 5 TABLET, FILM COATED ORAL at 21:16

## 2024-07-25 RX ADMIN — MEXILETINE HYDROCHLORIDE 200 MILLIGRAM(S): 200 CAPSULE ORAL at 05:04

## 2024-07-25 RX ADMIN — CHLORHEXIDINE GLUCONATE 1 APPLICATION(S): 500 CLOTH TOPICAL at 11:55

## 2024-07-25 RX ADMIN — LOSARTAN POTASSIUM 25 MILLIGRAM(S): 50 TABLET, FILM COATED ORAL at 05:04

## 2024-07-25 RX ADMIN — Medication 0.4 MILLIGRAM(S): at 21:16

## 2024-07-25 RX ADMIN — MEXILETINE HYDROCHLORIDE 200 MILLIGRAM(S): 200 CAPSULE ORAL at 13:12

## 2024-07-25 RX ADMIN — MEXILETINE HYDROCHLORIDE 200 MILLIGRAM(S): 200 CAPSULE ORAL at 21:16

## 2024-07-25 RX ADMIN — Medication 81 MILLIGRAM(S): at 11:54

## 2024-07-25 RX ADMIN — ATORVASTATIN CALCIUM 40 MILLIGRAM(S): 40 TABLET, FILM COATED ORAL at 21:15

## 2024-07-25 RX ADMIN — SPIRONOLACTONE 25 MILLIGRAM(S): 50 TABLET, FILM COATED ORAL at 05:04

## 2024-07-25 RX ADMIN — APIXABAN 2.5 MILLIGRAM(S): 5 TABLET, FILM COATED ORAL at 11:53

## 2024-07-25 RX ADMIN — Medication 25 MILLIGRAM(S): at 05:04

## 2024-07-25 RX ADMIN — PANTOPRAZOLE SODIUM 40 MILLIGRAM(S): 20 TABLET, DELAYED RELEASE ORAL at 05:04

## 2024-07-25 NOTE — PROGRESS NOTE ADULT - SUBJECTIVE AND OBJECTIVE BOX
[facility], [facility]                              [address] [place] [address]                                                 PHONE: [phone]                                                 FAX: [phone]  -----------------------------------------------------------------------------------------------  Pt seen and examined. FU for CAD    Overnight events/Complaints: Pt without complains. Breathing at baseline. No chest pain. Telemetry with episodes of PAF. Pt rescinded DNR/DNI. Agreeable to WCD    RELEVANT PHYSICAL EXAM:  Neck: No obvious JVD  Cardiovascular: regular S1, S2  Respiratory: Lungs clear to auscultation; no crepitations, no wheeze  Musculoskeletal: No edema    Vital Signs Last 24 Hrs  T(C): 36.8 (25 Jul 2024 08:02), Max: 36.8 (25 Jul 2024 00:36)  T(F): 98.2 (25 Jul 2024 08:02), Max: 98.2 (25 Jul 2024 00:36)  HR: 68 (25 Jul 2024 08:02) (60 - 68)  BP: 128/70 (25 Jul 2024 08:02) (122/78 - 152/66)  BP(mean): 95 (24 Jul 2024 16:48) (79 - 95)  RR: 18 (25 Jul 2024 08:02) (14 - 18)  SpO2: 98% (25 Jul 2024 08:02) (96% - 98%)    Parameters below as of 25 Jul 2024 08:02  Patient On (Oxygen Delivery Method): room air      I&O's Summary    24 Jul 2024 07:01  -  25 Jul 2024 07:00  --------------------------------------------------------  IN: 480 mL / OUT: 1350 mL / NET: -870 mL            LABS:                        12.6   8.21  )-----------( 218      ( 25 Jul 2024 05:29 )             37.1     07-25    136  |  101  |  13.9  ----------------------------<  60<L>  4.0   |  20.0<L>  |  0.68    Ca    8.1<L>      25 Jul 2024 05:29  Mg     1.9     07-25    TPro  5.5<L>  /  Alb  2.7<L>  /  TBili  0.5  /  DBili  x   /  AST  28  /  ALT  14  /  AlkPhos  60  07-24      RADIOLOGY & ADDITIONAL STUDIES: (reviewed)  CXR was independently visualized/reviewed  and demonstrated: Stable mild pulmonary vascular congestion.  No focal consolidation      CARDIOLOGY TESTING:(reviewed)     TELEMETRY independently visualized/reviewed and demonstrated : PAF    12 lead EKG independently visualized/reviewed  and demonstrated NSR    ECHOCARDIOGRAM independently visualized/reviewed and demonstrated :    1. Technically difficult image quality.   2. Left ventricular systolic function is severely decreased with an ejection fraction of 24 % by Gudino's method of disks.   3. There is severe (grade 3) left ventricular diastolic dysfunction, with elevated left ventricular filling pressure.   4. There is increased LV mass and concentric hypertrophy.   5. Normal right ventricular cavity size and mildly reduced right ventricular systolic function.   6. The left atrium is mildly dilated.   7. Mild mitral regurgitation.   8. Mild tricuspid regurgitation.   9. Mild aortic regurgitation.  10. Moderate aortic stenosis.  11. No prior echocardiogram is available for comparison.      CARDIAC CATH:  < from: Cardiac Catheterization (07.19.24 @ 16:55) >  Procedures Performed   Procedures:              1.    Arterial Access - Right Radial     2.  Diagnostic Coronary Angiography   3.    Left Heart Cath   4.    PCI: POBA   5.    PCI: BABAK   6.    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 s/p PTCA and BABAK with  minicrush technique    LVEF 25% on echo   Moderate RCA and Ramus disease       MEDICATIONS:(reviewed)  MEDICATIONS  (STANDING):  apixaban 2.5 milliGRAM(s) Oral every 12 hours  aspirin  chewable 81 milliGRAM(s) Oral daily  atorvastatin 40 milliGRAM(s) Oral at bedtime  chlorhexidine 2% Cloths 1 Application(s) Topical daily  clopidogrel Tablet 75 milliGRAM(s) Oral daily  losartan 25 milliGRAM(s) Oral daily  metoprolol succinate ER 25 milliGRAM(s) Oral daily  mexiletine 200 milliGRAM(s) Oral every 8 hours  pantoprazole    Tablet 40 milliGRAM(s) Oral before breakfast  spironolactone 25 milliGRAM(s) Oral daily  tamsulosin 0.4 milliGRAM(s) Oral at bedtime    ASSESSMENT AND PLAN:    89 year old male with a PMHx of Afib on eliquis and HFrEF (diagnosed 6 weeks ago at [facility]) (patient refused cath at that time) who presents from [facility] with syncopal episode (due to possible SDH however found to by hygromas and cleared for anticoagulation by neurosurgery) now found to have torsades on tele.   TTE LV EF ~ 25% see above  s/p LHC Severe stenosis of proxima/mid LAD and D1 s/p PTCA and BABAK with minicrush technique with Moderate RCA and Ramus disease   Amiodarone discontinued due to prolonged QTc    Continue ASA and Plavix  On  Mexiletine 200 mg po TID   Continue toprol XL, losartan and continue aldactone for CMP  keep K to 4, Mg to 2  Continue to monitor   Eliquis resumed. Plan to d/c ASA in 6 weeks  Limited Echo ordered to assess LV function  Offered pt WCD prior to discharge to assess if CMP will improve as pt back in NSR and post PCI of LAD. Pt agreeable to WCD. DNR rescinded. Plan to fit today    Plan d/w Dr. Tinsley

## 2024-07-25 NOTE — PROGRESS NOTE ADULT - SUBJECTIVE AND OBJECTIVE BOX
FER JOHNSON    [id number]    89y      Male    CC: afib     INTERVAL HPI/OVERNIGHT EVENTS: Pt seen and examined.     REVIEW OF SYSTEMS:    CONSTITUTIONAL: No fever, weight loss  RESPIRATORY: No cough, wheezing, hemoptysis  CARDIOVASCULAR: No chest pain, palpitations  GASTROINTESTINAL: No abdominal or epigastric pain. No nausea, vomiting  NEUROLOGICAL: No headaches    Vital Signs Last 24 Hrs  T(C): 36.6 (25 Jul 2024 16:14), Max: 36.8 (25 Jul 2024 00:36)  T(F): 97.8 (25 Jul 2024 16:14), Max: 98.2 (25 Jul 2024 00:36)  HR: 69 (25 Jul 2024 16:14) (62 - 81)  BP: 118/66 (25 Jul 2024 16:14) (99/58 - 152/66)  BP(mean): 84 (25 Jul 2024 16:14) (84 - 84)  RR: 18 (25 Jul 2024 16:14) (16 - 18)  SpO2: 99% (25 Jul 2024 16:14) (96% - 99%)    Parameters below as of 25 Jul 2024 16:14  Patient On (Oxygen Delivery Method): room air        PHYSICAL EXAM:    GENERAL: NAD  CHEST/LUNG: Clear to auscultation bilaterally  HEART: S1S2+, Regular rate and rhythm  ABDOMEN: Soft, Nontender, Nondistended; Bowel sounds present  SKIN: warm, dry   NEURO: AAOX3  PSYCH: calm ,cooperative     LABS:                        12.6   8.21  )-----------( 218      ( 25 Jul 2024 05:29 )             37.1     07-25    136  |  101  |  13.9  ----------------------------<  60<L>  4.0   |  20.0<L>  |  0.68    Ca    8.1<L>      25 Jul 2024 05:29  Mg     1.9     07-25    TPro  5.5<L>  /  Alb  2.7<L>  /  TBili  0.5  /  DBili  x   /  AST  28  /  ALT  14  /  AlkPhos  60  07-24      Urinalysis Basic - ( 25 Jul 2024 05:29 )    Color: x / Appearance: x / SG: x / pH: x  Gluc: 60 mg/dL / Ketone: x  / Bili: x / Urobili: x   Blood: x / Protein: x / Nitrite: x   Leuk Esterase: x / RBC: x / WBC x   Sq Epi: x / Non Sq Epi: x / Bacteria: x          MEDICATIONS  (STANDING):  apixaban 2.5 milliGRAM(s) Oral every 12 hours  aspirin  chewable 81 milliGRAM(s) Oral daily  atorvastatin 40 milliGRAM(s) Oral at bedtime  chlorhexidine 2% Cloths 1 Application(s) Topical daily  clopidogrel Tablet 75 milliGRAM(s) Oral daily  losartan 25 milliGRAM(s) Oral daily  metoprolol succinate ER 25 milliGRAM(s) Oral daily  mexiletine 200 milliGRAM(s) Oral every 8 hours  pantoprazole    Tablet 40 milliGRAM(s) Oral before breakfast  spironolactone 25 milliGRAM(s) Oral daily  tamsulosin 0.4 milliGRAM(s) Oral at bedtime    MEDICATIONS  (PRN):      RADIOLOGY & ADDITIONAL TESTS:

## 2024-07-25 NOTE — PROGRESS NOTE ADULT - ASSESSMENT
89y/oM with known Afib and systolic cardiomyopathy presented initially presenting to [facility] after syncope and collapse and fall, found to have a SDH and transferred to [facility] 7/16 for neurosx evaluation. No intervention indicated and cleared for Eliquis. Hospital course c/b RRT for Torsades that did not respond to Amiodarone. Transferred to the ICU for Lidocaine drip and close monitoring. briefly on pressors and then weaned off. Now s/p LHC, with 80% stenosis proximal LAD s/p 2 BABAK and 95% stenosis of the diagonal artery s/p 1 BABAK. Now weaned off Lidocaine drip and started on Mexiletine. downgraded to medicine 7/21    Torsades  NSVT  -cont  Telemetry monitoring  -s/p Lidocaine and amiodarone drip  -started on Mexiletine 200mg TID 7/21  -cardio recs appreciated; d/w Dr. Ratliff   -EP recs appreciated  -pt reportedly refusing AICD and life vest   -cont toprol and added losartan  -cont eliquis   -f/u repeat TTE 7/24; performed 7/25; pending read     Ischemic cardiomyopathy  -TTE from [facility] with EF 15%  - s/p LHC 7/19 with BABAK x 3  - c/w DAPT  - c/w statin  - keep K to 4, Mg to 2  - c/w Aldactone   - Further GDMT per cards    Bilateral subdural hygromas/hematoma  - Neurosurgery recommendations appreciated  - Per NSG, no absolute contraindication for Eliquis given on acute hemorrhage  - No antiseizure medications recommended    Afib  - Eliquis, metoprolol    Leukocytosis - now resolved   - Blood culture negative  - Urine culture positive for coag negative staph  - s/p Ceftriaxone x 5D    vte ppx - Eliquis    PT- rec BIJU    dispo: BIJU pending TTE read with or without lifevest

## 2024-07-26 ENCOUNTER — TRANSCRIPTION ENCOUNTER (OUTPATIENT)
Age: 89
End: 2024-07-26

## 2024-07-26 PROCEDURE — 99232 SBSQ HOSP IP/OBS MODERATE 35: CPT

## 2024-07-26 RX ORDER — TAMSULOSIN HCL 0.4 MG
0.8 CAPSULE ORAL AT BEDTIME
Refills: 0 | Status: DISCONTINUED | OUTPATIENT
Start: 2024-07-26 | End: 2024-07-30

## 2024-07-26 RX ADMIN — MEXILETINE HYDROCHLORIDE 200 MILLIGRAM(S): 200 CAPSULE ORAL at 13:05

## 2024-07-26 RX ADMIN — ATORVASTATIN CALCIUM 40 MILLIGRAM(S): 40 TABLET, FILM COATED ORAL at 21:59

## 2024-07-26 RX ADMIN — LOSARTAN POTASSIUM 25 MILLIGRAM(S): 50 TABLET, FILM COATED ORAL at 05:25

## 2024-07-26 RX ADMIN — PANTOPRAZOLE SODIUM 40 MILLIGRAM(S): 20 TABLET, DELAYED RELEASE ORAL at 05:25

## 2024-07-26 RX ADMIN — Medication 0.8 MILLIGRAM(S): at 21:59

## 2024-07-26 RX ADMIN — CHLORHEXIDINE GLUCONATE 1 APPLICATION(S): 500 CLOTH TOPICAL at 07:44

## 2024-07-26 RX ADMIN — APIXABAN 2.5 MILLIGRAM(S): 5 TABLET, FILM COATED ORAL at 09:27

## 2024-07-26 RX ADMIN — APIXABAN 2.5 MILLIGRAM(S): 5 TABLET, FILM COATED ORAL at 21:59

## 2024-07-26 RX ADMIN — CLOPIDOGREL BISULFATE 75 MILLIGRAM(S): 75 TABLET, FILM COATED ORAL at 07:44

## 2024-07-26 RX ADMIN — Medication 25 MILLIGRAM(S): at 05:25

## 2024-07-26 RX ADMIN — SPIRONOLACTONE 25 MILLIGRAM(S): 50 TABLET, FILM COATED ORAL at 05:25

## 2024-07-26 RX ADMIN — MEXILETINE HYDROCHLORIDE 200 MILLIGRAM(S): 200 CAPSULE ORAL at 05:25

## 2024-07-26 RX ADMIN — Medication 81 MILLIGRAM(S): at 07:44

## 2024-07-26 NOTE — CHART NOTE - NSCHARTNOTEFT_GEN_A_CORE
Source: Patient, Chart    Current Diet: Diet, Pureed:   DASH/TLC {Sodium & Cholesterol Restricted} (DASH)  Supplement Feeding Modality:  Oral  Ensure Enlive Cans or Servings Per Day:  1       Frequency:  Two Times a day (07-24-24 @ 14:21) [Active]    PO intake:  < 50%     Source for PO intake: patient, chart    Current Weight:   7/19 160.7 lbs  7/20 160.4 lbs  7/25 170.4 lbs  7/26 156.5 lbs    Generalized 1+ edema     Pertinent Medications: MEDICATIONS  (STANDING):  clopidogrel Tablet 75 milliGRAM(s) Oral daily  losartan 25 milliGRAM(s) Oral daily  metoprolol succinate ER 25 milliGRAM(s) Oral daily  mexiletine 200 milliGRAM(s) Oral every 8 hours  pantoprazole    Tablet 40 milliGRAM(s) Oral before breakfast  spironolactone 25 milliGRAM(s) Oral daily    Pertinent Labs: CBC Full  -  ( 25 Jul 2024 05:29 )  WBC Count : 8.21 K/uL  RBC Count : 4.20 M/uL  Hemoglobin : 12.6 g/dL  Hematocrit : 37.1 %    Skin:   bridge of nose abrasion   upper lip abrasion  right knee abrasion   right elbow abrasion     Nutrition focused physical exam conducted with signs of malnutrition present    Subcutaneous fat loss: [x] Orbital fat pads region, [x]Buccal fat region, [x]Triceps region,  [ ]Ribs region    Muscle wasting: [x]Temples region, [x]Clavicle region, [x]Shoulder region, [ ]Scapula region, [ ]Interosseous region,  [ ]thigh region, [ ]Calf region    Estimated Needs:   [x] recalculated   9946-1666 kcal (25-30 kcal/kg 71kg)  85-99 g protein (1.2-1.4g/kg 71kg)    Hospital Course: 89y/oM with known Afib and systolic cardiomyopathy presented initially presenting to [facility] after syncope and collapse and fall, found to have a SDH and transferred to [facility] 7/16 for neurosx evaluation. No intervention indicated and cleared for Eliquis. Hospital course c/b RRT for Torsades that did not respond to Amiodarone. Transferred to the ICU for Lidocaine drip and close monitoring. briefly on pressors and then weaned off. Now s/p LHC, with 80% stenosis proximal LAD s/p 2 BABAK and 95% stenosis of the diagonal artery s/p 1 BABAK. Now weaned off Lidocaine drip and started on Mexiletine. downgraded to medicine 7/21.    Current Nutrition Diagnosis: Chronic moderate malnutrition related to inability to meet sufficient protein-energy as evidenced by mild muscle/fat wasting, PO intake <75% EER >1 month.    Patient tolerating diet well with fair/poor appetite/PO intake. Pt reports over the past 3 months he has been in and out of the hospital causing him to eat less than he normally wound. States s/p fall he has difficulty eating regular foods - tolerating puree diet well. States he is unsure of his UBW, but has noticed weight loss. NFPE conducted and pt identified with malnutrition. Pt with no c/o N/V/C at this time. Reports previously having diarrhea, but has now resolved. Pt provided with diet education on DASH/TLC diet. RD contact information provided for further nutrition-related questions or concerns. Will continue to monitor and follow up as needed. RD remains available.     Recommendations:   1) Continue diet as tolerated.   2) Encourage po intake, monitor diet tolerance, and provide assistance at meals as needed.   3) Continue Ensure BID to optimize intake.  4) Rx: MVI daily.   5) Obtain daily weights to monitor trends.     Monitoring and Evaluation:   [x] PO intake [x] Tolerance to diet prescription [X] Weights  [X] Follow up per protocol [X] Labs: Chem 8

## 2024-07-26 NOTE — PROVIDER CONTACT NOTE (OTHER) - REASON
Pt wishes to change code status
Patient wishing to change code status
patient wants to change code status
Bloody stools

## 2024-07-26 NOTE — DISCHARGE NOTE PROVIDER - DETAILS OF MALNUTRITION DIAGNOSIS/DIAGNOSES
This patient has been assessed with a concern for Malnutrition and was treated during this hospitalization for the following Nutrition diagnosis/diagnoses:     -  07/26/2024: Moderate protein-calorie malnutrition

## 2024-07-26 NOTE — DISCHARGE NOTE PROVIDER - HOSPITAL COURSE
89y/oM with known Afib and systolic cardiomyopathy presented initially presenting to [facility] after syncope and collapse and fall, found to have a SDH and transferred to [facility] 7/16 for neurosx evaluation. No intervention indicated and cleared for Eliquis. Hospital course c/b RRT for Torsades that did not respond to Amiodarone. Transferred to the ICU for Lidocaine drip and close monitoring. briefly on pressors and then weaned off. Now s/p LHC, with 80% stenosis proximal LAD s/p 2 BABAK and 95% stenosis of the diagonal artery s/p 1 BABAK. Now weaned off Lidocaine drip and started on Mexiletine. downgraded to medicine 7/21. now repeat tte with improved EF. 89y/oM with known Afib and systolic cardiomyopathy presented initially presenting to [facility] after syncope and collapse and fall, found to have a SDH and transferred to [facility] 7/16 for neurosx evaluation. No intervention indicated and cleared for Eliquis. Hospital course c/b RRT for Torsades that did not respond to Amiodarone. Transferred to the ICU for Lidocaine drip and close monitoring. briefly on pressors and then weaned off. Now s/p LHC, with 80% stenosis proximal LAD s/p 2 BABAK and 95% stenosis of the diagonal artery s/p 1 BABAK. Now weaned off Lidocaine drip and started on Mexiletine. downgraded to medicine 7/21. now repeat tte with improved EF. Patient with episodes of AF with RVR. Mexiletine discontinued and amiodarone discontinued due to prolonged QTc. Amiodarone restarted and tolerated. Patient to continue on metoprolol, losartan, Eliquis and Plavix. Patient to continue low dose amiodarone 100 mg daily on discharge. Please discontinue amiodarone if Qtc > 500 msec.  Patient medically stable to be d/c to [facility]. 89y/oM with known Afib and systolic cardiomyopathy presented initially presenting to [facility] after syncope and collapse and fall, found to have a SDH and transferred to [facility] 7/16 for neurosx evaluation. No intervention indicated and cleared for Eliquis. Hospital course c/b RRT for Torsades that did not respond to Amiodarone. Transferred to the ICU for Lidocaine drip and close monitoring. briefly on pressors and then weaned off. Now s/p LHC, with 80% stenosis proximal LAD s/p 2 BABAK and 95% stenosis of the diagonal artery s/p 1 BABAK. EP recommending AICD and life vest. Patient refusing AICD and life vest. Now weaned off Lidocaine drip and started on Mexiletine. downgraded to medicine 7/21. now repeat tte with improved EF. Patient with episodes of AF with RVR. Mexiletine discontinued and amiodarone discontinued due to prolonged QTc. Amiodarone restarted and tolerated. Patient to continue on metoprolol, losartan, Eliquis and Plavix. Patient to continue low dose amiodarone 100 mg daily on discharge. Please discontinue amiodarone if Qtc > 500 msec.  Patient medically stable to be d/c to [facility].     89y/oM with known Afib and systolic cardiomyopathy presented initially presenting to [facility] after syncope and collapse and fall, found to have a SDH and transferred to [facility] 7/16 for neurosx evaluation. No intervention indicated and cleared for Eliquis. Hospital course c/b RRT for Torsades that did not respond to Amiodarone. Transferred to the ICU for Lidocaine drip and close monitoring. briefly on pressors and then weaned off. Now s/p LHC, with 80% stenosis proximal LAD s/p 2 BABAK and 95% stenosis of the diagonal artery s/p 1 BABAK. EP recommending AICD and life vest. Patient refused AICD and life vest. Now weaned off Lidocaine drip and started on Mexiletine. Patient was downgraded to medicine 7/21. Now repeat tte with improved EF. Patient with episodes of AF with RVR. Mexiletine discontinued and amiodarone discontinued due to prolonged QTc. Amiodarone restarted and tolerated. Patient to continue on metoprolol, losartan, Eliquis and Plavix. Patient to continue low dose amiodarone 100 mg daily on discharge. Please discontinue amiodarone if Qtc > 500 msec.  Patient medically stable to be d/c to [facility].    Torsades-NSVT  -cont  Telemetry monitoring  -s/p Lidocaine and amiodarone drip  -started on Mexiletine 200mg TID 7/21--> now d/c'ed as improved EF   -cardio recs appreciated  -EP recs appreciated  -pt reportedly refusing AICD and life vest   -cont toprol and added losartan  -cont eliquis   -f/u repeat TTE 7/24; performed 7/25 LVEF 60-65% degree of AS cannot be determined      Ischemic severe cardiomyopathy; now with recovered EF  -TTE from [facility] with EF 15%; Repeat LVEF 60-65% degree of AS cannot be determined    - s/p LHC 7/19 with BABAK x 3  - c/w DAPT  - c/w statin  - keep K to 4, Mg to 2  - c/w Aldactone   - Further GDMT per cards    Bilateral subdural hygromas/hematoma  - Neurosurgery recommendations appreciated  - Per NSG, no absolute contraindication for Eliquis given on acute hemorrhage  - No antiseizure medications recommended    Afib  - Eliquis, metoprolol    Leukocytosis - now resolved   - Blood culture negative  - Urine culture positive for coag negative staph  - s/p Ceftriaxone x 5D     89y/oM with known Afib and systolic cardiomyopathy presented initially presenting to [facility] after syncope and collapse and fall, found to have a SDH and transferred to [facility] 7/16 for neurosx evaluation. No intervention indicated and cleared for Eliquis. Hospital course c/b RRT for Torsades that did not respond to Amiodarone. Transferred to the ICU for Lidocaine drip and close monitoring. briefly on pressors and then weaned off. Now s/p LHC, with 80% stenosis proximal LAD s/p 2 BABAK and 95% stenosis of the diagonal artery s/p 1 BABAK. EP recommending AICD and life vest. Patient refused AICD and life vest. Now weaned off Lidocaine drip and started on Mexiletine. Patient was downgraded to medicine 7/21. Now repeat tte with improved EF. Patient with episodes of AF with RVR. Mexiletine discontinued and amiodarone discontinued due to prolonged QTc. Amiodarone restarted and tolerated. Patient to continue on metoprolol, losartan, Eliquis and Plavix. Patient to continue low dose amiodarone 100 mg daily on discharge. Please discontinue amiodarone if Qtc > 500 msec.  Patient medically stable to be d/c to [facility].    Torsades-NSVT  -cont  Telemetry monitoring  -s/p Lidocaine and amiodarone drip  -started on Mexiletine 200mg TID 7/21--> now d/c'ed as improved EF   -cardio recs appreciated  -EP recs appreciated  -pt reportedly refusing AICD and life vest   -cont toprol and added losartan  -cont ELIQUIS   -f/u repeat TTE 7/24; performed 7/25 LVEF 60-65% degree of AS cannot be determined      Ischemic severe cardiomyopathy; now with recovered EF  -TTE from [facility] with EF 15%; Repeat LVEF 60-65% degree of AS cannot be determined    - s/p LHC 7/19 with BABAK x 3  - c/w DAPT  - c/w statin  - keep K to 4, Mg to 2  - c/w Aldactone   - Further GDMT per cards    Bilateral subdural hygromas/hematoma  - Neurosurgery recommendations appreciated  - Per NSG, no absolute contraindication for Eliquis given on acute hemorrhage  - No antiseizure medications recommended    Afib  - Eliquis, metoprolol    Leukocytosis - now resolved   - Blood culture negative  - Urine culture positive for coag negative staph  - s/p Ceftriaxone x 5D

## 2024-07-26 NOTE — DIETITIAN NUTRITION RISK NOTIFICATION - TREATMENT: THE FOLLOWING DIET HAS BEEN RECOMMENDED
Diet, Pureed:   DASH/TLC {Sodium & Cholesterol Restricted} (DASH)  Supplement Feeding Modality:  Oral  Ensure Enlive Cans or Servings Per Day:  1       Frequency:  Two Times a day (07-24-24 @ 14:21) [Active]

## 2024-07-26 NOTE — PROGRESS NOTE ADULT - SUBJECTIVE AND OBJECTIVE BOX
FER JOHNSON    [id number]    89y      Male    CC: afib    INTERVAL HPI/OVERNIGHT EVENTS: Pt seen and examined. no new complaints     REVIEW OF SYSTEMS:    CONSTITUTIONAL: No weight loss  RESPIRATORY: No cough, wheezing, hemoptysis; No shortness of breath  CARDIOVASCULAR: No chest pain, palpitations  GASTROINTESTINAL: No abdominal or epigastric pain. No nausea, vomiting    Vital Signs Last 24 Hrs  T(C): 36.7 (26 Jul 2024 08:00), Max: 36.8 (26 Jul 2024 04:00)  T(F): 98 (26 Jul 2024 08:00), Max: 98.2 (26 Jul 2024 04:00)  HR: 70 (26 Jul 2024 13:02) (62 - 70)  BP: 130/70 (26 Jul 2024 13:02) (116/68 - 147/71)  BP(mean): 84 (25 Jul 2024 16:14) (84 - 84)  RR: 18 (26 Jul 2024 13:02) (18 - 18)  SpO2: 96% (26 Jul 2024 13:02) (94% - 99%)    Parameters below as of 26 Jul 2024 13:02  Patient On (Oxygen Delivery Method): room air        PHYSICAL EXAM:    GENERAL: NAD  CHEST/LUNG: Clear to auscultation bilaterally  HEART: S1S2+, Regular rate and rhythm  ABDOMEN: Soft, Nontender, Nondistended; Bowel sounds present  SKIN: warm, dry   NEURO: AAOX3  PSYCH: calm ,cooperative     LABS:                        12.6   8.21  )-----------( 218      ( 25 Jul 2024 05:29 )             37.1     07-25    136  |  101  |  13.9  ----------------------------<  60<L>  4.0   |  20.0<L>  |  0.68    Ca    8.1<L>      25 Jul 2024 05:29  Mg     1.9     07-25        Urinalysis Basic - ( 25 Jul 2024 05:29 )    Color: x / Appearance: x / SG: x / pH: x  Gluc: 60 mg/dL / Ketone: x  / Bili: x / Urobili: x   Blood: x / Protein: x / Nitrite: x   Leuk Esterase: x / RBC: x / WBC x   Sq Epi: x / Non Sq Epi: x / Bacteria: x          MEDICATIONS  (STANDING):  apixaban 2.5 milliGRAM(s) Oral every 12 hours  aspirin  chewable 81 milliGRAM(s) Oral daily  atorvastatin 40 milliGRAM(s) Oral at bedtime  chlorhexidine 2% Cloths 1 Application(s) Topical daily  clopidogrel Tablet 75 milliGRAM(s) Oral daily  losartan 25 milliGRAM(s) Oral daily  metoprolol succinate ER 25 milliGRAM(s) Oral daily  mexiletine 200 milliGRAM(s) Oral every 8 hours  pantoprazole    Tablet 40 milliGRAM(s) Oral before breakfast  spironolactone 25 milliGRAM(s) Oral daily  tamsulosin 0.8 milliGRAM(s) Oral at bedtime    MEDICATIONS  (PRN):      RADIOLOGY & ADDITIONAL TESTS:

## 2024-07-26 NOTE — PROGRESS NOTE ADULT - CONVERSATION DETAILS
pt previously dnr/dni; had rescinded as wanted LifeVest if qualified. However, given recovered EF and now no indication for LifeVest, pt and pt's daughter wanting to reinstate Dnr/dni; new MOLST completed.

## 2024-07-26 NOTE — DISCHARGE NOTE PROVIDER - NSDCMRMEDTOKEN_GEN_ALL_CORE_FT
Eliquis 2.5 mg oral tablet: 1 tab(s) orally 2 times a day  Entresto 24 mg-26 mg oral tablet: 1 tab(s) orally once a day  Lasix 40 mg oral tablet: 1 tab(s) orally once a day  spironolactone 25 mg oral tablet: 1 tab(s) orally once a day   clopidogrel 75 mg oral tablet: 1 tab(s) orally once a day  Eliquis 2.5 mg oral tablet: 1 tab(s) orally 2 times a day  Entresto 24 mg-26 mg oral tablet: 1 tab(s) orally once a day  Lasix 40 mg oral tablet: 1 tab(s) orally once a day  losartan 25 mg oral tablet: 1 tab(s) orally once a day  metoprolol tartrate 25 mg oral tablet: 1 tab(s) orally 3 times a day  spironolactone 25 mg oral tablet: 1 tab(s) orally once a day   amiodarone 200 mg oral tablet: 0.5 tab(s) orally once a day  atorvastatin 40 mg oral tablet: 1 tab(s) orally once a day (at bedtime)  clopidogrel 75 mg oral tablet: 1 tab(s) orally once a day  Eliquis 2.5 mg oral tablet: 1 tab(s) orally 2 times a day  losartan 25 mg oral tablet: 1 tab(s) orally once a day  metoprolol tartrate 25 mg oral tablet: 1 tab(s) orally 3 times a day  pantoprazole 40 mg oral delayed release tablet: 1 tab(s) orally once a day (before a meal)  spironolactone 25 mg oral tablet: 1 tab(s) orally once a day  tamsulosin 0.4 mg oral capsule: 2 cap(s) orally once a day (at bedtime)

## 2024-07-26 NOTE — PROGRESS NOTE ADULT - ASSESSMENT
89y/oM with known Afib and systolic cardiomyopathy presented initially presenting to [facility] after syncope and collapse and fall, found to have a SDH and transferred to [facility] 7/16 for neurosx evaluation. No intervention indicated and cleared for Eliquis. Hospital course c/b RRT for Torsades that did not respond to Amiodarone. Transferred to the ICU for Lidocaine drip and close monitoring. briefly on pressors and then weaned off. Now s/p LHC, with 80% stenosis proximal LAD s/p 2 BABAK and 95% stenosis of the diagonal artery s/p 1 BABAK. Now weaned off Lidocaine drip and started on Mexiletine. downgraded to medicine 7/21. now repeat tte with improved EF     Torsades  NSVT  -cont  Telemetry monitoring  -s/p Lidocaine and amiodarone drip  -started on Mexiletine 200mg TID 7/21--> now d/c'ed as improved EF   -cardio recs appreciated; d/w Dr. Ratliff   -EP recs appreciated  -pt reportedly refusing AICD and life vest   -cont toprol and added losartan  -cont eliquis   -f/u repeat TTE 7/24; performed 7/25 LVEF 60-65% degree of AS cannot be determined      Ischemic cardiomyopathy; now with recovered EF  -TTE from [facility] with EF 15%  - s/p LHC 7/19 with BABAK x 3  - c/w DAPT  - c/w statin  - keep K to 4, Mg to 2  - c/w Aldactone   - Further GDMT per cards    Bilateral subdural hygromas/hematoma  - Neurosurgery recommendations appreciated  - Per NSG, no absolute contraindication for Eliquis given on acute hemorrhage  - No antiseizure medications recommended    Afib  - Eliquis, metoprolol    Leukocytosis - now resolved   - Blood culture negative  - Urine culture positive for coag negative staph  - s/p Ceftriaxone x 5D    vte ppx - Eliquis    PT- rec [facility]    dispo: BIJU pending auth

## 2024-07-26 NOTE — PROVIDER CONTACT NOTE (OTHER) - SITUATION
Pt wishing to change code status to DNR/DNI. PA aware and will come to bedside to speak to patient and family member.
Pt wishes to change code status to DNR/DNI.
Large BM with bloody stools

## 2024-07-26 NOTE — DISCHARGE NOTE PROVIDER - NSDCCPCAREPLAN_GEN_ALL_CORE_FT
PRINCIPAL DISCHARGE DIAGNOSIS  Diagnosis: Atrial fibrillation with rapid ventricular response  Assessment and Plan of Treatment: resolved. on mexiletine, now d/c'ed given improved EF. medications adjusted as per cardiology and EP      SECONDARY DISCHARGE DIAGNOSES  Diagnosis: Subdural hematoma  Assessment and Plan of Treatment: seen by neurosurgery. cleared to continue AC    Diagnosis: Atrial fibrillation with rapid ventricular response  Assessment and Plan of Treatment:      PRINCIPAL DISCHARGE DIAGNOSIS  Diagnosis: Atrial fibrillation with rapid ventricular response  Assessment and Plan of Treatment: resolved. on mexiletine, now d/c'ed given improved EF. medications adjusted as per cardiology and EP:  Continue metoprolol, losartan, Eliquis and Plavix as ordered.   Continue low dose amiodarone 100 mg daily on discharge.   Please discontinue amiodarone if Qtc > 500 msec.  Please follow up with PMD.      SECONDARY DISCHARGE DIAGNOSES  Diagnosis: Subdural hematoma  Assessment and Plan of Treatment: seen by neurosurgery. cleared to continue AC

## 2024-07-26 NOTE — DISCHARGE NOTE PROVIDER - CARE PROVIDER_API CALL
Eulogio Ratliff [facility]  Cardiology  [address] [place] [address]  Phone: [phone]  Fax: [phone]  Follow Up Time:     Mitch Haney  Cardiovascular Disease  [address] [place] [address]  Phone: [phone]  Fax: [phone]  Follow Up Time:    Eulogio Ratliff  Cardiology  [address] [place] [address]  Phone: [phone]  Fax: [phone]  Follow Up Time:     Mitch Haney  Cardiovascular Disease  [address] [place] [address]  Phone: [phone]  Fax: [phone]  Follow Up Time:     Primary, Provider  Phone: (   )    -  Fax: (   )    -  Follow Up Time:

## 2024-07-26 NOTE — DISCHARGE NOTE PROVIDER - PROVIDER TOKENS
PROVIDER:[TOKEN:[[id number]]],PROVIDER:[TOKEN:[[id number]]] PROVIDER:[TOKEN:[[id number]]],PROVIDER:[TOKEN:[[id number]]],FREE:[LAST:[Primary],FIRST:[Provider],PHONE:[(   )    -],FAX:[(   )    -]]

## 2024-07-26 NOTE — PROGRESS NOTE ADULT - SUBJECTIVE AND OBJECTIVE BOX
[facility], THE HEART CENTER                              [address] [place] [address]                                                 PHONE: [phone]                                                 FAX: [phone]  -----------------------------------------------------------------------------------------------  Pt seen and examined. FU for CAD    Overnight events/Complaints: Pt without complains. Breathing at baseline. No chest pain. Declined WCD    RELEVANT PHYSICAL EXAM:  Neck: No obvious JVD  Cardiovascular: regular S1, S2  Respiratory: Lungs clear to auscultation; no crepitations, no wheeze  Musculoskeletal: No edema    Vital Signs Last 24 Hrs  T(C): 36.7 (26 Jul 2024 08:00), Max: 36.8 (26 Jul 2024 04:00)  T(F): 98 (26 Jul 2024 08:00), Max: 98.2 (26 Jul 2024 04:00)  HR: 67 (26 Jul 2024 08:00) (62 - 81)  BP: 147/71 (26 Jul 2024 08:00) (99/58 - 147/71)  BP(mean): 84 (25 Jul 2024 16:14) (84 - 84)  RR: 18 (26 Jul 2024 08:00) (18 - 18)  SpO2: 94% (26 Jul 2024 08:00) (94% - 99%)    Parameters below as of 26 Jul 2024 08:00  Patient On (Oxygen Delivery Method): room air      I&O's Summary    25 Jul 2024 07:01  -  26 Jul 2024 07:00  --------------------------------------------------------  IN: 730 mL / OUT: 900 mL / NET: -170 mL    26 Jul 2024 07:01  -  26 Jul 2024 10:57  --------------------------------------------------------  IN: 0 mL / OUT: 1100 mL / NET: -1100 mL            LABS:                        12.6   8.21  )-----------( 218      ( 25 Jul 2024 05:29 )             37.1     07-25    136  |  101  |  13.9  ----------------------------<  60<L>  4.0   |  20.0<L>  |  0.68    Ca    8.1<L>      25 Jul 2024 05:29  Mg     1.9     07-25    RADIOLOGY & ADDITIONAL STUDIES: (reviewed)  CXR was independently visualized/reviewed  and demonstrated: Stable mild pulmonary vascular congestion.  No focal consolidation      CARDIOLOGY TESTING:(reviewed)     TELEMETRY independently visualized/reviewed and demonstrated : PAF    12 lead EKG independently visualized/reviewed  and demonstrated NSR    ECHOCARDIOGRAM independently visualized/reviewed and demonstrated :      1. Left ventricular systolic function is normal with an ejection fraction visually estimated at 60 to 65 %.   2. No pericardial effusion seen.   3. There is moderate calcification of the mitral valve annulus.   4. Thickened mitral valve leaflets.   5. Tricuspid aorticvalve with normal leaflet excursion with reduced systolic excursion. There is moderate calcification of the aortic valve leaflets. Degree of AS cannot be determined on this study.    CARDIAC CATH:  < from: Cardiac Catheterization (07.19.24 @ 16:55) >  Procedures Performed   Procedures:              1.    Arterial Access - Right Radial     2.  Diagnostic Coronary Angiography   3.    Left Heart Cath   4.    PCI: POBA   5.    PCI: BABAK   6.    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 s/p PTCA and BABAK with  minicrush technique    LVEF 25% on echo   Moderate RCA and Ramus disease       MEDICATIONS:(reviewed)  MEDICATIONS  (STANDING):  apixaban 2.5 milliGRAM(s) Oral every 12 hours  aspirin  chewable 81 milliGRAM(s) Oral daily  atorvastatin 40 milliGRAM(s) Oral at bedtime  chlorhexidine 2% Cloths 1 Application(s) Topical daily  clopidogrel Tablet 75 milliGRAM(s) Oral daily  losartan 25 milliGRAM(s) Oral daily  metoprolol succinate ER 25 milliGRAM(s) Oral daily  mexiletine 200 milliGRAM(s) Oral every 8 hours  pantoprazole    Tablet 40 milliGRAM(s) Oral before breakfast  spironolactone 25 milliGRAM(s) Oral daily  tamsulosin 0.4 milliGRAM(s) Oral at bedtime    ASSESSMENT AND PLAN:    89 year old male with a PMHx of Afib on eliquis and HFrEF (diagnosed 6 weeks ago at [facility]) (patient refused cath at that time) who presents from [facility] with syncopal episode (due to possible SDH however found to by hygromas and cleared for anticoagulation by neurosurgery) now found to have torsades on tele.   TTE LV EF ~ 25% now improved back to 60%  s/p LHC Severe stenosis of proxima/mid LAD and D1 s/p PTCA and BABAK with minicrush technique with Moderate RCA and Ramus disease   Amiodarone discontinued due to prolonged QTc    Continue ASA and Plavix  On  Mexiletine 200 mg po TID. Will confirm with EP need to continue  Continue toprol XL, losartan and continue aldactone for CMP  Eliquis resumed. Plan to d/c ASA in 6 weeks  Limited Echo  with improvement of LV function to normal. No indication for WCD at this time    Plan d/w Medicine team

## 2024-07-26 NOTE — DISCHARGE NOTE PROVIDER - CARE PROVIDERS DIRECT ADDRESSES
[email or web address]

## 2024-07-27 LAB — GLUCOSE BLDC GLUCOMTR-MCNC: 128 MG/DL — HIGH (ref 70–99)

## 2024-07-27 PROCEDURE — 99233 SBSQ HOSP IP/OBS HIGH 50: CPT

## 2024-07-27 RX ADMIN — ATORVASTATIN CALCIUM 40 MILLIGRAM(S): 40 TABLET, FILM COATED ORAL at 21:49

## 2024-07-27 RX ADMIN — CLOPIDOGREL BISULFATE 75 MILLIGRAM(S): 75 TABLET, FILM COATED ORAL at 09:16

## 2024-07-27 RX ADMIN — Medication 0.8 MILLIGRAM(S): at 21:50

## 2024-07-27 RX ADMIN — PANTOPRAZOLE SODIUM 40 MILLIGRAM(S): 20 TABLET, DELAYED RELEASE ORAL at 05:20

## 2024-07-27 RX ADMIN — CHLORHEXIDINE GLUCONATE 1 APPLICATION(S): 500 CLOTH TOPICAL at 09:16

## 2024-07-27 RX ADMIN — APIXABAN 2.5 MILLIGRAM(S): 5 TABLET, FILM COATED ORAL at 21:50

## 2024-07-27 RX ADMIN — Medication 81 MILLIGRAM(S): at 09:16

## 2024-07-27 RX ADMIN — SPIRONOLACTONE 25 MILLIGRAM(S): 50 TABLET, FILM COATED ORAL at 05:21

## 2024-07-27 RX ADMIN — Medication 25 MILLIGRAM(S): at 05:20

## 2024-07-27 RX ADMIN — APIXABAN 2.5 MILLIGRAM(S): 5 TABLET, FILM COATED ORAL at 09:16

## 2024-07-27 RX ADMIN — LOSARTAN POTASSIUM 25 MILLIGRAM(S): 50 TABLET, FILM COATED ORAL at 05:21

## 2024-07-27 NOTE — PROGRESS NOTE ADULT - SUBJECTIVE AND OBJECTIVE BOX
FER JOHNSON Patient is a 89y old  Male who presents with a chief complaint of AFIB with RVR with syncope and collapse (26 Jul 2024 13:54)     HPI:  89 year old male with known Afib  and systolic cardiomyopathy presented to [facility] after syncope and collapse and fall and bruises face swollen lip bruised forehead and face nose - found to have a SDH and in HR of 120-150s - received lopressor and Diltiazem in ED .  doesn't know who his cardiologist denied CP or orthopnea or VELA or edema   he was by the front sink when he passed outas he turned-  he felt dizzy and lightheaded and turned around and fell face forward onto the ground with +LOC associated pain in upper mouth with loose tooth.   Denies vertigo , CP , Palpitations, headache, neck pain, vision changes, weakness, numbness, tingling.   Pt presented with Afib with RVR with stable BP with associated nausea and vomiting. CTH obtained upon arrival.  currently denies headache, nausea, vomiting, vision changes.   Endorses facial pain and left hand pain.  Repeat CT scan obtained which is stable without evidence of acute hemorrhage   he doesn't know his home meds- is pleasant but not a great historian- states he lives alone     Collateral hx from daughter Maritza-he has a"weak heart pumps "15%" his medications are Eliquis, Entresto and spironolactone and h takes the meds himself but she sets them up for him  he has been in his usual self -   discussed with neuro- OK to start  home Eliquis - Hygroma    (16 Jul 2024 08:47)    The patient was seen and evaluated - sitting in inclined supported bed - feeding himself pudding- was looking for it and couldn't find it on the trAY  The patient is in no acute distress.  Denied any fever chest pain, palpitations,  abdominal pain, fever, dysuria,       I&O's Summary    26 Jul 2024 07:01  -  27 Jul 2024 07:00  --------------------------------------------------------  IN: 220 mL / OUT: 2050 mL / NET: -1830 mL      Allergies    penicillins (Unknown)    Intolerances      HEALTH ISSUES - PROBLEM Dx:        PAST MEDICAL & SURGICAL HISTORY:  Afib              Vital Signs Last 24 Hrs  T(C): 36.6 (27 Jul 2024 16:37), Max: 36.9 (27 Jul 2024 12:00)  T(F): 97.8 (27 Jul 2024 16:37), Max: 98.5 (27 Jul 2024 12:00)  HR: 64 (27 Jul 2024 16:37) (64 - 98)  BP: 131/55 (27 Jul 2024 16:37) (107/63 - 147/70)  BP(mean): 74 (27 Jul 2024 12:00) (74 - 74)  RR: 18 (27 Jul 2024 16:37) (18 - 18)  SpO2: 100% (27 Jul 2024 16:37) (93% - 100%)    Parameters below as of 27 Jul 2024 16:37  Patient On (Oxygen Delivery Method): room air    T(C): 36.6 (07-27-24 @ 16:37), Max: 36.9 (07-27-24 @ 12:00)  HR: 64 (07-27-24 @ 16:37) (64 - 98)  BP: 131/55 (07-27-24 @ 16:37) (107/63 - 147/70)  RR: 18 (07-27-24 @ 16:37) (18 - 18)  SpO2: 100% (07-27-24 @ 16:37) (93% - 100%)  Wt(kg): --    PHYSICAL EXAM:    GENERAL: NAD frail elderly conversing - [place]hto   HEAD:  Atraumatic, Normocephalic  EYES: EOMI, conjunctiva and sclera clear  ENMT:  Moist mucous membranes,  No lesions  NECK: Supple,   NERVOUS SYSTEM:  Alert & Oriented X3,  Moves upper and lower extremities; CNS-II-XII  CHEST/LUNG: Clear to auscultation bilaterally; No rales, rhonchi, wheezing,   HEART: Regular rate and rhythm  ABDOMEN: Soft, Nontender, Nondistended; Bowel sounds present  EXTREMITIES:  Peripheral Pulses,  psychiatry- mood and affect appropriate,     apixaban 2.5 milliGRAM(s) Oral every 12 hours  aspirin  chewable 81 milliGRAM(s) Oral daily  atorvastatin 40 milliGRAM(s) Oral at bedtime  chlorhexidine 2% Cloths 1 Application(s) Topical daily  clopidogrel Tablet 75 milliGRAM(s) Oral daily  losartan 25 milliGRAM(s) Oral daily  metoprolol succinate ER 25 milliGRAM(s) Oral daily  pantoprazole    Tablet 40 milliGRAM(s) Oral before breakfast  spironolactone 25 milliGRAM(s) Oral daily  tamsulosin 0.8 milliGRAM(s) Oral at bedtime      LABS:                      CAPILLARY BLOOD GLUCOSE      POCT Blood Glucose.: 128 mg/dL (27 Jul 2024 11:34)      RADIOLOGY & ADDITIONAL TESTS:      Consultant notes reviewed    Case discussed with consultant/provider/ family /patient

## 2024-07-27 NOTE — PROGRESS NOTE ADULT - ASSESSMENT
89y/oM with known Afib and systolic cardiomyopathy presented initially presenting to [facility] after syncope and collapse and fall, found to have a SDH and transferred to [facility] 7/16 for neurosx evaluation. No intervention indicated and cleared for Eliquis. Hospital course c/b RRT for Torsades that did not respond to Amiodarone. Transferred to the ICU for Lidocaine drip and close monitoring. briefly on pressors and then weaned off. Now s/p LHC, with 80% stenosis proximal LAD s/p 2 BABAK and 95% stenosis of the diagonal artery s/p 1 BABAK. Now weaned off Lidocaine drip and started on Mexiletine. downgraded to medicine 7/21. now repeat tte with improved EF     Torsades-NSVT  -cont  Telemetry monitoring  -s/p Lidocaine and amiodarone drip  -started on Mexiletine 200mg TID 7/21--> now d/c'ed as improved EF   -cardio recs appreciated; d/w Dr. Ratliff   -EP recs appreciated  -pt reportedly refusing AICD and life vest   -cont toprol and added losartan  -cont eliquis   -f/u repeat TTE 7/24; performed 7/25 LVEF 60-65% degree of AS cannot be determined      Ischemic severe cardiomyopathy; now with recovered EF  -TTE from [facility] with EF 15%  - s/p LHC 7/19 with BABAK x 3  - c/w DAPT  - c/w statin  - keep K to 4, Mg to 2  - c/w Aldactone   - Further GDMT per cards    Bilateral subdural hygromas/hematoma  - Neurosurgery recommendations appreciated  - Per NSG, no absolute contraindication for Eliquis given on acute hemorrhage  - No antiseizure medications recommended    Afib  - Eliquis, metoprolol    Leukocytosis - now resolved   - Blood culture negative  - Urine culture positive for coag negative staph  - s/p Ceftriaxone x 5D    vte ppx - Eliquis    PT- rec BIJU    dispo: BIJU pending auth

## 2024-07-28 LAB
ANION GAP SERPL CALC-SCNC: 14 MMOL/L — SIGNIFICANT CHANGE UP (ref 5–17)
APTT BLD: 33.1 SEC — SIGNIFICANT CHANGE UP (ref 24.5–35.6)
BUN SERPL-MCNC: 11.3 MG/DL — SIGNIFICANT CHANGE UP (ref 8–20)
CALCIUM SERPL-MCNC: 9 MG/DL — SIGNIFICANT CHANGE UP (ref 8.4–10.5)
CHLORIDE SERPL-SCNC: 101 MMOL/L — SIGNIFICANT CHANGE UP (ref 96–108)
CO2 SERPL-SCNC: 24 MMOL/L — SIGNIFICANT CHANGE UP (ref 22–29)
CREAT SERPL-MCNC: 0.83 MG/DL — SIGNIFICANT CHANGE UP (ref 0.5–1.3)
EGFR: 85 ML/MIN/1.73M2 — SIGNIFICANT CHANGE UP
GLUCOSE BLDC GLUCOMTR-MCNC: 108 MG/DL — HIGH (ref 70–99)
GLUCOSE SERPL-MCNC: 107 MG/DL — HIGH (ref 70–99)
HCT VFR BLD CALC: 39.3 % — SIGNIFICANT CHANGE UP (ref 39–50)
HGB BLD-MCNC: 13.2 G/DL — SIGNIFICANT CHANGE UP (ref 13–17)
INR BLD: 1.27 RATIO — HIGH (ref 0.85–1.18)
MAGNESIUM SERPL-MCNC: 1.9 MG/DL — SIGNIFICANT CHANGE UP (ref 1.6–2.6)
MCHC RBC-ENTMCNC: 30 PG — SIGNIFICANT CHANGE UP (ref 27–34)
MCHC RBC-ENTMCNC: 33.6 GM/DL — SIGNIFICANT CHANGE UP (ref 32–36)
MCV RBC AUTO: 89.3 FL — SIGNIFICANT CHANGE UP (ref 80–100)
PHOSPHATE SERPL-MCNC: 2.9 MG/DL — SIGNIFICANT CHANGE UP (ref 2.4–4.7)
PLATELET # BLD AUTO: 309 K/UL — SIGNIFICANT CHANGE UP (ref 150–400)
POTASSIUM SERPL-MCNC: 4.3 MMOL/L — SIGNIFICANT CHANGE UP (ref 3.5–5.3)
POTASSIUM SERPL-SCNC: 4.3 MMOL/L — SIGNIFICANT CHANGE UP (ref 3.5–5.3)
PROTHROM AB SERPL-ACNC: 14 SEC — HIGH (ref 9.5–13)
RBC # BLD: 4.4 M/UL — SIGNIFICANT CHANGE UP (ref 4.2–5.8)
RBC # FLD: 15.3 % — HIGH (ref 10.3–14.5)
SODIUM SERPL-SCNC: 139 MMOL/L — SIGNIFICANT CHANGE UP (ref 135–145)
WBC # BLD: 10.96 K/UL — HIGH (ref 3.8–10.5)
WBC # FLD AUTO: 10.96 K/UL — HIGH (ref 3.8–10.5)

## 2024-07-28 PROCEDURE — 99233 SBSQ HOSP IP/OBS HIGH 50: CPT

## 2024-07-28 PROCEDURE — 93010 ELECTROCARDIOGRAM REPORT: CPT

## 2024-07-28 RX ORDER — METOPROLOL TARTRATE 100 MG
5 TABLET ORAL ONCE
Refills: 0 | Status: COMPLETED | OUTPATIENT
Start: 2024-07-28 | End: 2024-07-28

## 2024-07-28 RX ORDER — METOPROLOL TARTRATE 100 MG
25 TABLET ORAL THREE TIMES A DAY
Refills: 0 | Status: DISCONTINUED | OUTPATIENT
Start: 2024-07-28 | End: 2024-07-30

## 2024-07-28 RX ORDER — LOPERAMIDE HYDROCHLORIDE 2 MG/1
2 CAPSULE ORAL ONCE
Refills: 0 | Status: COMPLETED | OUTPATIENT
Start: 2024-07-28 | End: 2024-07-28

## 2024-07-28 RX ORDER — BACTERIOSTATIC SODIUM CHLORIDE 0.9 %
500 VIAL (ML) INJECTION ONCE
Refills: 0 | Status: COMPLETED | OUTPATIENT
Start: 2024-07-28 | End: 2024-07-28

## 2024-07-28 RX ORDER — APIXABAN 5 MG/1
1 TABLET, FILM COATED ORAL
Refills: 0 | DISCHARGE

## 2024-07-28 RX ORDER — SPIRONOLACTONE 50 MG/1
1 TABLET, FILM COATED ORAL
Refills: 0 | DISCHARGE

## 2024-07-28 RX ORDER — AMIODARONE HYDROCHLORIDE 50 MG/ML
200 INJECTION, SOLUTION INTRAVENOUS DAILY
Refills: 0 | Status: DISCONTINUED | OUTPATIENT
Start: 2024-07-28 | End: 2024-07-30

## 2024-07-28 RX ADMIN — LOPERAMIDE HYDROCHLORIDE 2 MILLIGRAM(S): 2 CAPSULE ORAL at 18:50

## 2024-07-28 RX ADMIN — Medication 81 MILLIGRAM(S): at 10:57

## 2024-07-28 RX ADMIN — Medication 25 MILLIGRAM(S): at 21:23

## 2024-07-28 RX ADMIN — ATORVASTATIN CALCIUM 40 MILLIGRAM(S): 40 TABLET, FILM COATED ORAL at 21:23

## 2024-07-28 RX ADMIN — PANTOPRAZOLE SODIUM 40 MILLIGRAM(S): 20 TABLET, DELAYED RELEASE ORAL at 06:07

## 2024-07-28 RX ADMIN — CHLORHEXIDINE GLUCONATE 1 APPLICATION(S): 500 CLOTH TOPICAL at 11:00

## 2024-07-28 RX ADMIN — Medication 25 MILLIGRAM(S): at 11:59

## 2024-07-28 RX ADMIN — Medication 0.8 MILLIGRAM(S): at 21:23

## 2024-07-28 RX ADMIN — Medication 25 MILLIGRAM(S): at 15:51

## 2024-07-28 RX ADMIN — SPIRONOLACTONE 25 MILLIGRAM(S): 50 TABLET, FILM COATED ORAL at 06:07

## 2024-07-28 RX ADMIN — CLOPIDOGREL BISULFATE 75 MILLIGRAM(S): 75 TABLET, FILM COATED ORAL at 10:57

## 2024-07-28 RX ADMIN — APIXABAN 2.5 MILLIGRAM(S): 5 TABLET, FILM COATED ORAL at 10:58

## 2024-07-28 RX ADMIN — APIXABAN 2.5 MILLIGRAM(S): 5 TABLET, FILM COATED ORAL at 21:22

## 2024-07-28 RX ADMIN — Medication 500 MILLILITER(S): at 10:40

## 2024-07-28 RX ADMIN — Medication 5 MILLIGRAM(S): at 10:40

## 2024-07-28 RX ADMIN — AMIODARONE HYDROCHLORIDE 200 MILLIGRAM(S): 50 INJECTION, SOLUTION INTRAVENOUS at 12:06

## 2024-07-28 RX ADMIN — Medication 25 MILLIGRAM(S): at 06:07

## 2024-07-28 RX ADMIN — LOSARTAN POTASSIUM 25 MILLIGRAM(S): 50 TABLET, FILM COATED ORAL at 06:07

## 2024-07-28 NOTE — PROGRESS NOTE ADULT - SUBJECTIVE AND OBJECTIVE BOX
FER JOHNSON Patient is a 89y old  Male who presents with a chief complaint of AFIB with RVR with syncope and collapse (28 Jul 2024 11:28)     HPI:  89 year old male with known Afib  and systolic cardiomyopathyp[facility]ed [facility] syncope and collapse and fall and bruises face swollen lip bruised forehead and face nose - found to have a SDH and in HR of 120-150s - received lopressor and Diltiazem in ED .  doesn't know who his cardiologist denied CP or orthopnea or VELA or edema   he was by the front sink when he passed outas he turned-  he felt dizzy and lightheaded and turned around and fell face forward onto the ground with +LOC associated pain in upper mouth with loose tooth.   Denies vertigo , CP , Palpitations, headache, neck pain, vision changes, weakness, numbness, tingling.   Pt presented with Afib with RVR with stable BP with associated nausea and vomiting. CTH obtained upon arrival.  currently denies headache, nausea, vomiting, vision changes.   Endorses facial pain and left hand pain.  Repeat CT scan obtained which is stable without evidence of acute hemorrhage   he doesn't know his home meds- is pleasant but not a great historian- states he lives alone     Collateral hx from daughter Maritza-he has a"weak heart pumps "15%" his medications are Eliquis, Entresto and spironolactone and h takes the meds himself but she sets them up for him  he has been in his usual self -   discussed with neuro- OK to start  home Eliquis - Hygroma    (16 Jul 2024 08:47)    The patient was seen and evaluated - earlier had AFIB with RVR  The patient is in no acute distress.  Denied any fever chest pain, abdominal pain, fever, dysuria, cough, edema       I&O's Summary    27 Jul 2024 07:01  -  28 Jul 2024 07:00  --------------------------------------------------------  IN: 0 mL / OUT: 1830 mL / NET: -1830 mL    28 Jul 2024 07:01  -  28 Jul 2024 17:34  --------------------------------------------------------  IN: 740 mL / OUT: 0 mL / NET: 740 mL      Allergies    penicillins (Unknown)    Intolerances      HEALTH ISSUES - PROBLEM Dx:        PAST MEDICAL & SURGICAL HISTORY:  Afib              Vital Signs Last 24 Hrs  T(C): 36.7 (28 Jul 2024 16:36), Max: 36.8 (28 Jul 2024 05:05)  T(F): 98 (28 Jul 2024 16:36), Max: 98.3 (28 Jul 2024 05:05)  HR: 74 (28 Jul 2024 16:36) (72 - 142)  BP: 124/56 (28 Jul 2024 16:36) (90/60 - 124/56)  BP(mean): --  RR: 18 (28 Jul 2024 16:36) (17 - 22)  SpO2: 100% (28 Jul 2024 16:36) (95% - 100%)    Parameters below as of 28 Jul 2024 16:36  Patient On (Oxygen Delivery Method): room air    T(C): 36.7 (07-28-24 @ 16:36), Max: 36.8 (07-28-24 @ 05:05)  HR: 74 (07-28-24 @ 16:36) (72 - 142)  BP: 124/56 (07-28-24 @ 16:36) (90/60 - 124/56)  RR: 18 (07-28-24 @ 16:36) (17 - 22)  SpO2: 100% (07-28-24 @ 16:36) (95% - 100%)  Wt(kg): --    PHYSICAL EXAM:    GENERAL: NAD, frail elderly  HEAD:  Atraumatic, Normocephalic  EYES: EOMI, PERRL, conjunctiva and sclera clear  ENMT:  Moist mucous membranes,    NERVOUS SYSTEM:  Alert & Oriented X3,  Moves upper and lower extremities; CNS-II-XII  CHEST/LUNG: Clear to auscultation bilaterally;   HEART: iRRegular rate and rhythm; No murmurs,   ABDOMEN: Soft, Nontender, Nondistended; Bowel sounds present  EXTREMITIES:  Peripheral Pulses  psychiatry- mood and affect appropriate, Insight and judgement intact     aMIOdarone    Tablet 200 milliGRAM(s) Oral daily  apixaban 2.5 milliGRAM(s) Oral every 12 hours  aspirin  chewable 81 milliGRAM(s) Oral daily  atorvastatin 40 milliGRAM(s) Oral at bedtime  chlorhexidine 2% Cloths 1 Application(s) Topical daily  clopidogrel Tablet 75 milliGRAM(s) Oral daily  losartan 25 milliGRAM(s) Oral daily  metoprolol tartrate 25 milliGRAM(s) Oral three times a day  pantoprazole    Tablet 40 milliGRAM(s) Oral before breakfast  spironolactone 25 milliGRAM(s) Oral daily  tamsulosin 0.8 milliGRAM(s) Oral at bedtime      LABS:                          13.2   10.96 )-----------( 309      ( 28 Jul 2024 10:02 )             39.3     07-28    139  |  101  |  11.3  ----------------------------<  107<H>  4.3   |  24.0  |  0.83    Ca    9.0      28 Jul 2024 10:02  Phos  2.9     07-28  Mg     1.9     07-28        PT/INR - ( 28 Jul 2024 10:02 )   PT: 14.0 sec;   INR: 1.27 ratio         PTT - ( 28 Jul 2024 10:02 )  PTT:33.1 sec      Urinalysis Basic - ( 28 Jul 2024 10:02 )    Color: x / Appearance: x / SG: x / pH: x  Gluc: 107 mg/dL / Ketone: x  / Bili: x / Urobili: x   Blood: x / Protein: x / Nitrite: x   Leuk Esterase: x / RBC: x / WBC x   Sq Epi: x / Non Sq Epi: x / Bacteria: x      CAPILLARY BLOOD GLUCOSE      POCT Blood Glucose.: 108 mg/dL (28 Jul 2024 09:59)      RADIOLOGY & ADDITIONAL TESTS:      Consultant notes reviewed    Case discussed with consultant/provider/ family /patient

## 2024-07-28 NOTE — PROGRESS NOTE ADULT - ASSESSMENT
89y/oM with known Afib and systolic cardiomyopathy presented initially presenting to [facility] after syncope and collapse and fall, found to have a SDH and transferred to [facility] 7/16 for neurosx evaluation. No intervention indicated and cleared for Eliquis. Hospital course c/b RRT for Torsades that did not respond to Amiodarone. Transferred to the ICU for Lidocaine drip and close monitoring. briefly on pressors and then weaned off. Now s/p LHC, with 80% stenosis proximal LAD s/p 2 BABAK and 95% stenosis of the diagonal artery s/p 1 BABAK. Now weaned off Lidocaine drip and started on Mexiletine. downgraded to medicine 7/21. now repeat tte with improved EF     Torsades-NSVT  -cont  Telemetry monitoring  -s/p Lidocaine and amiodarone drip  -started on Mexiletine 200mg TID 7/21--> now d/c'ed as improved EF   -cardio recs appreciated  -EP recs appreciated  -pt reportedly refusing AICD and life vest   -cont toprol and added losartan  -cont eliquis   -f/u repeat TTE 7/24; performed 7/25 LVEF 60-65% degree of AS cannot be determined      Ischemic severe cardiomyopathy; now with recovered EF  -TTE from [facility] with EF 15%  - s/p LHC 7/19 with BABAK x 3  - c/w DAPT  - c/w statin  - keep K to 4, Mg to 2  - c/w Aldactone   - Further GDMT per cards    Bilateral subdural hygromas/hematoma  - Neurosurgery recommendations appreciated  - Per NSG, no absolute contraindication for Eliquis given on acute hemorrhage  - No antiseizure medications recommended    Afib  - Eliquis, metoprolol    Leukocytosis - now resolved   - Blood culture negative  - Urine culture positive for coag negative staph  - s/p Ceftriaxone x 5D    vte ppx - Eliquis    PT- rec BIJU

## 2024-07-28 NOTE — RAPID RESPONSE TEAM SUMMARY - NSOTHERINTERVENTIONSRRT_GEN_ALL_CORE
500cc NS bolus   CBC, BMP, Mag, Phos, Coags  Continue with vitals q4 on unit  Patient remains DNR/DNI at this time; MOLST in chart  Will discuss with [facility] for further recs   Plan discussed with Dr. Robbins at bedside  Continue to monitor; RR follow-up in two hours   RN to notify provider with any changes in patient status    500cc NS bolus   CBC, BMP, Mag, Phos, Coags  Continue with vitals q4 on unit  Patient remains DNR/DNI at this time; MOL in chart signed on 7/26  Will discuss with [facility] for further recs   Plan discussed with Dr. Robbins at bedside  Continue to monitor; RR follow-up in two hours   RN to notify provider with any changes in patient status

## 2024-07-28 NOTE — CHART NOTE - NSCHARTNOTESELECT_GEN_ALL_CORE
Event Note
Nutrition Services
Post Cardiac Cath NP Note/Event Note
Site check/Event Note
Trauma Surgery

## 2024-07-28 NOTE — CHART NOTE - NSCHARTNOTEFT_GEN_A_CORE
RAPID RESPONSE 2 HOUR FOLLOW UP NOTE:    Patient seen and examined at bedside. RR called @ 09:56am for sustaining AFIB w/RVR. Pt reassessed around 12:00. Denies headaches, dizziness, chest pain, SOB, palpitations, chills/fever, diaphoresis, abdominal pain, n/v/d or any other complaints.    Vital Signs Last 24 Hrs  T(C): 36.7 (28 Jul 2024 08:03), Max: 36.8 (28 Jul 2024 05:05)    T(F): 98 (28 Jul 2024 08:03), Max: 98.3 (28 Jul 2024 05:05)  HR: 72 (28 Jul 2024 10:08) (64 - 142)  BP: 111/65 (28 Jul 2024 10:08) (90/60 - 131/55)  RR: 18 (28 Jul 2024 10:08) (17 - 22)  SpO2: 97% (28 Jul 2024 10:08) (95% - 100%)    Parameters below as of 28 Jul 2024 10:08  Patient On (Oxygen Delivery Method): room air    ROS: no complaints at presernt    PHYSICAL EXAM:  GENERAL: Pt lying in bed comfortably in NAD  CHEST/LUNG: Clear to auscultation bilaterally; No rales, rhonchi or wheezing. Unlabored respirations  HEART: S1, S2   ABDOMEN: Bowel sounds present; Soft, Nontender, Nondistended. No guarding or rigidity    EXTREMITIES:  2+ Peripheral Pulses, brisk capillary refill <2 seconds. No clubbing, cyanosis, or edema   NERVOUS SYSTEM:  Alert & Oriented X3, speech clear. Answers questions appropriately. Full and equal strength B/L upper and lower extremities. No deficits   MSK: FROM x 4 extremities   SKIN: No rashes or lesions  Neurology: A&Ox3, nonfocal, CN II-XII grossly intact, sensation intact, no gait abnormalities   Extremities: No clubbing, cyanosis, edema; + peripheral pulses, + peripheral pulses, FROM  Capillary refill<2 sec  MSK: Normal ROM, no joint erythema or warmth, no joint swelling   Skin: warm, dry, normal color, no rash or abnormal lesions      LABS:                        13.2   10.96 )-----------( 309      ( 28 Jul 2024 10:02 )             39.3       07-28    139  |  101  |  11.3  ----------------------------<  107<H>  4.3   |  24.0  |  0.83    Ca    9.0      28 Jul 2024 10:02  Phos  2.9     07-28  Mg     1.9     07-28      CAPILLARY BLOOD GLUCOSE  POCT Blood Glucose.: 108 mg/dL (28 Jul 2024 09:59)    PT/INR - ( 28 Jul 2024 10:02 )   PT: 14.0 sec;   INR: 1.27 ratio    PTT - ( 28 Jul 2024 10:02 )  PTT:33.1 sec    Urinalysis Basic - ( 28 Jul 2024 10:02 )    Color: x / Appearance: x / SG: x / pH: x  Gluc: 107 mg/dL / Ketone: x  / Bili: x / Urobili: x   Blood: x / Protein: x / Nitrite: x   Leuk Esterase: x / RBC: x / WBC x   Sq Epi: x / Non Sq Epi: x / Bacteria: x    I&O's Summary    27 Jul 2024 07:01  -  28 Jul 2024 07:00  --------------------------------------------------------  IN: 0 mL / OUT: 1830 mL / NET: -1830 mL      IMAGING:    ASSESSMENT/ PLAN:  s/p Rapid Response for ___- .       DISPOSITION:  - Maintain current level of care  - Hemodynamically stable  - Continue to monitor  D/w [facility]  D/w Medicine attending RAPID RESPONSE 2 HOUR FOLLOW UP NOTE:    Patient seen and examined at bedside. RR called @ 09:56am for sustaining AFIB w/RVR. Pt reassessed around 12:00. Denies headaches, dizziness, chest pain, SOB, palpitations, chills/fever, diaphoresis, abdominal pain, n/v/d or any other complaints.    Vital Signs Last 24 Hrs  T(C): 36.7 (28 Jul 2024 08:03), Max: 36.8 (28 Jul 2024 05:05)    T(F): 98 (28 Jul 2024 08:03), Max: 98.3 (28 Jul 2024 05:05)  HR: 72 (28 Jul 2024 10:08) (64 - 142)  BP: 111/65 (28 Jul 2024 10:08) (90/60 - 131/55)  RR: 18 (28 Jul 2024 10:08) (17 - 22)  SpO2: 97% (28 Jul 2024 10:08) (95% - 100%)    Parameters below as of 28 Jul 2024 10:08  Patient On (Oxygen Delivery Method): room air    ROS: no complaints at presernt    PHYSICAL EXAM: unchanged from previous  GENERAL: Pt lying in bed comfortably in NAD  CHEST/LUNG: Clear to auscultation bilaterally; No rales, rhonchi or wheezing. Unlabored respirations  HEART: S1, S2,    ABDOMEN: Bowel sounds present; Soft, Nontender, Nondistended. No guarding or rigidity    EXTREMITIES:  2+ Peripheral Pulses, brisk capillary refill <2 seconds. No clubbing, cyanosis, or edema   NERVOUS SYSTEM:  Alert & Oriented X3, speech clear. Answers questions appropriately.   Skin: warm, dry, + multiple healing scabs    LABS:                        13.2   10.96 )-----------( 309      ( 28 Jul 2024 10:02 )             39.3       07-28    139  |  101  |  11.3  ----------------------------<  107<H>  4.3   |  24.0  |  0.83    Ca    9.0      28 Jul 2024 10:02  Phos  2.9     07-28  Mg     1.9     07-28      CAPILLARY BLOOD GLUCOSE  POCT Blood Glucose.: 108 mg/dL (28 Jul 2024 09:59)    PT/INR - ( 28 Jul 2024 10:02 )   PT: 14.0 sec;   INR: 1.27 ratio    PTT - ( 28 Jul 2024 10:02 )  PTT:33.1 sec    I&O's Summary    27 Jul 2024 07:01  -  28 Jul 2024 07:00  --------------------------------------------------------  IN: 0 mL / OUT: 1830 mL / NET: -1830 mL      ASSESSMENT/ PLAN:  s/p Rapid Response for AFIB w/RVR      DISPOSITION:  - Maintain current level of care  - Hemodynamically stable  - Continue to monitor  D/w [facility] - recommendations appreciated  Metoprolol titrated up increased to 25mg PO TID  Amiodarone restarted  D/w Medicine attending

## 2024-07-28 NOTE — RAPID RESPONSE TEAM SUMMARY - NSADDTLFINDINGSRRT_GEN_ALL_CORE
EKG -  AFIB, no acute changes noted  09:58 BP 96/62,  irreg, RR 22, temp 97.6, O2 sat 95% on RA,   10:02 BP 90/60      10:04                     Lopressor 5mg IVP administered  10:06 /59 HR 76     Serial EKGs AFIB, then SR w/1st degree AVB  10:08 /65 HR 72  O2 sat 97% RA  10:08 BP 11    EKG -  AFIB, no acute changes noted     PHYSICAL EXAM:    GENERAL: NAD, frail,  in bed, comfortable  SKIN: + multiple healing scabs B/L knees, bridge of the nose, + mildly cyanotic lips  CHEST/LUNG: Clear to percussion bilaterally; No rales, rhonchi, wheezing, or rubs  HEART: tachy, irregular, No murmurs, rubs, or gallops  ABDOMEN: Soft, Nontender, Nondistended; Bowel sounds present  EXTREMITIES:  2+ Peripheral Pulses, No clubbing, cyanosis, or edema  NERVOUS SYSTEM:  Alert & Oriented X3, Good concentration

## 2024-07-28 NOTE — RAPID RESPONSE TEAM SUMMARY - NSSITUATIONBACKGROUNDRRT_GEN_ALL_CORE
Patient is a 89 year old male with known Afib and systolic cardiomyopathy presented initially presenting to [facility] after syncope and collapse and fall, found to have a SDH and transferred to [facility] 7/16 for neurosx evaluation. No intervention indicated and cleared for Eliquis. Hospital course c/b RRT for Torsades that did not respond to Amiodarone. Transferred to the ICU for Lidocaine drip and close monitoring. briefly on pressors and then weaned off. Now s/p LHC, with 80% stenosis proximal LAD s/p 2 BABAK and 95% stenosis of the diagonal artery s/p 1 BABAK. Weaned off Lidocaine drip and started on Mexiletine. downgraded to medicine 7/21. Repeat tte with improved EF 60-65%, Mexiletine discontinued.   RR called today for AFIB with -180s. Patient seen at bedside with RRT. Patient with complaints of being cold and wanting to have a BM. Denies lightheadedness, dizziness, chest pain, palpitations, SOB, dyspnea, abdominal pain, N/V/D.  Stat EKG noted below, given lopressor 5mg IVP x1, and NS 500cc bolus for BP 90/60 during rapid -> BP improved to 111/65.     VITALS at 1002AM  T(F): 97.6 orally  HR: 142 irregular   BP: 96/62 mmHg   RR: 17 unlabored   SpO2: 99% on room air                Patient is a 89 year old male with known Afib and systolic cardiomyopathy presented initially presenting to [facility] after syncope and collapse and fall, found to have a SDH and transferred to [facility] 7/16 for neurosx evaluation. No intervention indicated and cleared for Eliquis. Hospital course c/b RRT for Torsades that did not respond to Amiodarone. Transferred to the ICU for Lidocaine drip and close monitoring. briefly on pressors and then weaned off. Now s/p LHC, with 80% stenosis proximal LAD s/p 2 BABAK and 95% stenosis of the diagonal artery s/p 1 BABAK. Weaned off Lidocaine drip and started on Mexiletine. downgraded to medicine 7/21. Repeat tte with improved EF 60-65%, Mexiletine discontinued.     RR called today for AFIB with -180s. Patient seen at bedside with RRT. Patient with complaints of being cold and wanting to have a BM. Denies lightheadedness, dizziness, chest pain, palpitations, chills/fever, diaphoresis, SOB, dyspnea, abdominal pain, N/V/D.    Stat EKG noted below, given lopressor 5mg IVP x1, and NS 500cc bolus for BP 90/60 during rapid -> BP improved to 111/65.     VITALS at 09:58AM  T(F): 97.6 orally  HR: 142 irregular   BP: 96/62 mmHg   RR: 22 unlabored   SpO2: 95- 99% on room air

## 2024-07-28 NOTE — PROGRESS NOTE ADULT - SUBJECTIVE AND OBJECTIVE BOX
[facility], THE HEART CENTER                              [address] [place] [address]                                                 PHONE: [phone]                                                 FAX: [phone]  -----------------------------------------------------------------------------------------------  Pt seen and examined. FU for CAD    Events noted. Rapid response called for AF with RVR. Received iv lopressor    Overnight events/Complaints: Pt reports no palpitations. AF rates elevated. No chest pain.    RELEVANT PHYSICAL EXAM:  Neck: No obvious JVD  Cardiovascular: irregular S1, S2  Respiratory: Lungs clear to auscultation; no crepitations, no wheeze  Musculoskeletal: No edema    Vital Signs Last 24 Hrs  T(C): 36.7 (28 Jul 2024 08:03), Max: 36.9 (27 Jul 2024 12:00)  T(F): 98 (28 Jul 2024 08:03), Max: 98.5 (27 Jul 2024 12:00)  HR: 138 (28 Jul 2024 10:02) (64 - 138)  BP: 90/60 (28 Jul 2024 10:02) (90/60 - 131/55)  BP(mean): 74 (27 Jul 2024 12:00) (74 - 74)  RR: 17 (28 Jul 2024 08:03) (17 - 18)  SpO2: 99% (28 Jul 2024 08:03) (98% - 100%)    Parameters below as of 28 Jul 2024 08:03  Patient On (Oxygen Delivery Method): room air      I&O's Summary    27 Jul 2024 07:01  -  28 Jul 2024 07:00  --------------------------------------------------------  IN: 0 mL / OUT: 1830 mL / NET: -1830 mL            LABS:                        13.2   10.96 )-----------( 309      ( 28 Jul 2024 10:02 )             39.3     07-28    139  |  101  |  11.3  ----------------------------<  107<H>  4.3   |  24.0  |  0.83    Ca    9.0      28 Jul 2024 10:02  Phos  2.9     07-28  Mg     1.9     07-28          PT/INR - ( 28 Jul 2024 10:02 )   PT: 14.0 sec;   INR: 1.27 ratio         PTT - ( 28 Jul 2024 10:02 )  PTT:33.1 sec      RADIOLOGY & ADDITIONAL STUDIES: (reviewed)  CXR was independently visualized/reviewed  and demonstrated: Stable mild pulmonary vascular congestion.  No focal consolidation      CARDIOLOGY TESTING:(reviewed)     TELEMETRY independently visualized/reviewed and demonstrated : AF with RVR    12 lead EKG independently visualized/reviewed  and demonstrated AF with RVR and Qtc 460 msec    ECHOCARDIOGRAM independently visualized/reviewed and demonstrated :    1. Technically difficult image quality.   2. Left ventricular systolic function is severely decreased with an ejection fraction of 24 % by Gudino's method of disks.   3. There is severe (grade 3) left ventricular diastolic dysfunction, with elevated left ventricular filling pressure.   4. There is increased LV mass and concentric hypertrophy.   5. Normal right ventricular cavity size and mildly reduced right ventricular systolic function.   6. The left atrium is mildly dilated.   7. Mild mitral regurgitation.   8. Mild tricuspid regurgitation.   9. Mild aortic regurgitation.  10. Moderate aortic stenosis.  11. No prior echocardiogram is available for comparison.      CARDIAC CATH:  < from: Cardiac Catheterization (07.19.24 @ 16:55) >  Procedures Performed   Procedures:              1.    Arterial Access - Right Radial     2.  Diagnostic Coronary Angiography   3.    Left Heart Cath   4.    PCI: POBA   5.    PCI: BABAK   6.    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 s/p PTCA and BABAK with  minicrush technique    LVEF 25% on echo   Moderate RCA and Ramus disease       MEDICATIONS:(reviewed)  MEDICATIONS  (STANDING):  apixaban 2.5 milliGRAM(s) Oral every 12 hours  aspirin  chewable 81 milliGRAM(s) Oral daily  atorvastatin 40 milliGRAM(s) Oral at bedtime  chlorhexidine 2% Cloths 1 Application(s) Topical daily  clopidogrel Tablet 75 milliGRAM(s) Oral daily  losartan 25 milliGRAM(s) Oral daily  metoprolol succinate ER 25 milliGRAM(s) Oral daily  pantoprazole    Tablet 40 milliGRAM(s) Oral before breakfast  spironolactone 25 milliGRAM(s) Oral daily  tamsulosin 0.8 milliGRAM(s) Oral at bedtime    ASSESSMENT AND PLAN:    89 year old male with a PMHx of Afib on eliquis and HFrEF (diagnosed 6 weeks ago at [facility]) (patient refused cath at that time) who presents from [facility] with syncopal episode (due to possible SDH however found to by hygromas and cleared for anticoagulation by neurosurgery) now found to have torsades on tele.   TTE LV EF ~ 25% see above  s/p LHC Severe stenosis of proxima/mid LAD and D1 s/p PTCA and BABAK with minicrush technique with Moderate RCA and Ramus disease       Episodes of AF with RVR. Mexiletine discontinued and amiodarone discontinued due to prolonged QTc  Increase metoprolol to 25 mg TID  Continue ASA and Plavix with Eliquis  Will restart amiodarone po. ECG in AM to assess QTc. Pt needs to maintain NSR to sustain improvement in LVEF

## 2024-07-29 PROCEDURE — 93010 ELECTROCARDIOGRAM REPORT: CPT

## 2024-07-29 PROCEDURE — 99232 SBSQ HOSP IP/OBS MODERATE 35: CPT

## 2024-07-29 RX ADMIN — AMIODARONE HYDROCHLORIDE 200 MILLIGRAM(S): 50 INJECTION, SOLUTION INTRAVENOUS at 05:12

## 2024-07-29 RX ADMIN — PANTOPRAZOLE SODIUM 40 MILLIGRAM(S): 20 TABLET, DELAYED RELEASE ORAL at 05:13

## 2024-07-29 RX ADMIN — ATORVASTATIN CALCIUM 40 MILLIGRAM(S): 40 TABLET, FILM COATED ORAL at 22:10

## 2024-07-29 RX ADMIN — APIXABAN 2.5 MILLIGRAM(S): 5 TABLET, FILM COATED ORAL at 22:09

## 2024-07-29 RX ADMIN — Medication 81 MILLIGRAM(S): at 08:14

## 2024-07-29 RX ADMIN — SPIRONOLACTONE 25 MILLIGRAM(S): 50 TABLET, FILM COATED ORAL at 05:12

## 2024-07-29 RX ADMIN — Medication 25 MILLIGRAM(S): at 05:12

## 2024-07-29 RX ADMIN — Medication 25 MILLIGRAM(S): at 22:09

## 2024-07-29 RX ADMIN — Medication 25 MILLIGRAM(S): at 13:51

## 2024-07-29 RX ADMIN — CLOPIDOGREL BISULFATE 75 MILLIGRAM(S): 75 TABLET, FILM COATED ORAL at 08:11

## 2024-07-29 RX ADMIN — Medication 0.8 MILLIGRAM(S): at 22:10

## 2024-07-29 RX ADMIN — APIXABAN 2.5 MILLIGRAM(S): 5 TABLET, FILM COATED ORAL at 08:12

## 2024-07-29 RX ADMIN — LOSARTAN POTASSIUM 25 MILLIGRAM(S): 50 TABLET, FILM COATED ORAL at 05:12

## 2024-07-29 RX ADMIN — CHLORHEXIDINE GLUCONATE 1 APPLICATION(S): 500 CLOTH TOPICAL at 08:14

## 2024-07-29 NOTE — PROGRESS NOTE ADULT - NUTRITIONAL ASSESSMENT
This patient has been assessed with a concern for Malnutrition and has been determined to have a diagnosis/diagnoses of Moderate protein-calorie malnutrition.    This patient is being managed with:   Diet Pureed-  DASH/TLC {Sodium & Cholesterol Restricted} (DASH)  Supplement Feeding Modality:  Oral  Ensure Enlive Cans or Servings Per Day:  1       Frequency:  Two Times a day  Entered: Jul 24 2024  2:22PM  

## 2024-07-29 NOTE — PROGRESS NOTE ADULT - ASSESSMENT
89y/oM with known Afib and systolic cardiomyopathy presented initially presenting to [facility] after syncope and collapse and fall, found to have a SDH and transferred to [facility] 7/16 for neurosx evaluation. No intervention indicated and cleared for Eliquis. Hospital course c/b RRT for Torsades that did not respond to Amiodarone. Transferred to the ICU for Lidocaine drip and close monitoring. briefly on pressors and then weaned off. Now s/p LHC, with 80% stenosis proximal LAD s/p 2 BABAK and 95% stenosis of the diagonal artery s/p 1 BABAK. Now weaned off Lidocaine drip and started on Mexiletine. downgraded to medicine 7/21. now repeat tte with improved EF    QTC increased - discussed with Dr Nguyen- amiodarone restarted - follow repeat EKG in AM   Torsades-NSVT  -cont  Telemetry monitoring  -s/p Lidocaine and amiodarone drip  -started on Mexiletine 200mg TID 7/21--> now d/c'ed as improved EF   -cardio recs appreciated  -EP recs appreciated  -pt reportedly refusing AICD and life vest   -cont toprol and added losartan  -cont eliquis   -f/u repeat TTE 7/24; performed 7/25 LVEF 60-65% degree of AS cannot be determined      Ischemic severe cardiomyopathy; now with recovered EF  -TTE from [facility] with [place] 15%  - s/p LHC 7/19 with BABAK x 3  - c/w DAPT  - c/w statin  - keep K to 4, Mg to 2  - c/w Aldactone   - Further GDMT per cards    Bilateral subdural hygromas/hematoma  - Neurosurgery recommendations appreciated  - Per NSG, no absolute contraindication for Eliquis given on acute hemorrhage  - No antiseizure medications recommended    Afib  - Eliquis, metoprolol    Leukocytosis - now resolved   - Blood culture negative  - Urine culture positive for coag negative staph  - s/p Ceftriaxone x 5D    vte ppx - Eliquis    PT- rec BIJU

## 2024-07-29 NOTE — PROGRESS NOTE ADULT - SUBJECTIVE AND OBJECTIVE BOX
[facility], THE HEART CENTER                                   [address] [place] [address]                                                      PHONE: [phone]                                                         FAX: [phone]  [email or web address]  ---------------------------------------------------------------------------------------------------------------------------------    Overnight events/patient complaints:  Patient feeling well today.  He converted back to NSR.      PAST MEDICAL & SURGICAL HISTORY:  Afib          penicillins (Unknown)    MEDICATIONS  (STANDING):  aMIOdarone    Tablet 200 milliGRAM(s) Oral daily  apixaban 2.5 milliGRAM(s) Oral every 12 hours  aspirin  chewable 81 milliGRAM(s) Oral daily  atorvastatin 40 milliGRAM(s) Oral at bedtime  chlorhexidine 2% Cloths 1 Application(s) Topical daily  clopidogrel Tablet 75 milliGRAM(s) Oral daily  losartan 25 milliGRAM(s) Oral daily  metoprolol tartrate 25 milliGRAM(s) Oral three times a day  pantoprazole    Tablet 40 milliGRAM(s) Oral before breakfast  spironolactone 25 milliGRAM(s) Oral daily  tamsulosin 0.8 milliGRAM(s) Oral at bedtime    MEDICATIONS  (PRN):      Vital Signs Last 24 Hrs  T(C): 36.6 (29 Jul 2024 07:50), Max: 37.2 (29 Jul 2024 00:45)  T(F): 97.8 (29 Jul 2024 07:50), Max: 98.9 (29 Jul 2024 00:45)  HR: 56 (29 Jul 2024 07:50) (56 - 142)  BP: 98/53 (29 Jul 2024 07:50) (90/60 - 124/56)  BP(mean): --  RR: 17 (29 Jul 2024 07:50) (16 - 22)  SpO2: 93% (29 Jul 2024 07:50) (93% - 100%)    Parameters below as of 29 Jul 2024 07:50  Patient On (Oxygen Delivery Method): room air      ICU Vital Signs Last 24 Hrs  FER JOHNSON  I&O's Detail    28 Jul 2024 07:01  -  29 Jul 2024 07:00  --------------------------------------------------------  IN:    Oral Fluid: 740 mL    Sodium Chloride 0.9% Bolus: 500 mL  Total IN: 1240 mL    OUT:    Indwelling Catheter - Urethral (mL): 400 mL    Voided (mL): 1000 mL  Total OUT: 1400 mL    Total NET: -160 mL        I&O's Summary    28 Jul 2024 07:01  -  29 Jul 2024 07:00  --------------------------------------------------------  IN: 1240 mL / OUT: 1400 mL / NET: -160 mL      Drug Dosing Weight  FER JOHNSON      PHYSICAL EXAM:  General: Appears well developed, alert and cooperative.  HEENT: Head; normocephalic, atraumatic.  Eyes: Pupils reactive, cornea wnl.  Neck: Supple, no nodes adenopathy, no JVD, no carotid bruit  CARDIOVASCULAR: Normal S1 and S2, No murmur, rub, gallop or lift.   LUNGS: No rales, rhonchi or wheeze. Normal breath sounds bilaterally.  ABDOMEN: Soft, nontender, nondistended  EXTREMITIES: No clubbing or edema. Distal pulses wnl.   SKIN: warm and dry with normal turgor.  NEURO: Alert/oriented x 3/normal motor exam.   PSYCH: normal affect.        LABS:                        13.2   10.96 )-----------( 309      ( 28 Jul 2024 10:02 )             39.3     07-28    139  |  101  |  11.3  ----------------------------<  107<H>  4.3   |  24.0  |  0.83    Ca    9.0      28 Jul 2024 10:02  Phos  2.9     07-28  Mg     1.9     07-28      FER JOHNSON      PT/INR - ( 28 Jul 2024 10:02 )   PT: 14.0 sec;   INR: 1.27 ratio         PTT - ( 28 Jul 2024 10:02 )  PTT:33.1 sec  Urinalysis Basic - ( 28 Jul 2024 10:02 )    Color: x / Appearance: x / SG: x / pH: x  Gluc: 107 mg/dL / Ketone: x  / Bili: x / Urobili: x   Blood: x / Protein: x / Nitrite: x   Leuk Esterase: x / RBC: x / WBC x   Sq Epi: x / Non Sq Epi: x / Bacteria: x        RADIOLOGY & ADDITIONAL STUDIES:    INTERPRETATION OF TELEMETRY (personally reviewed):    ECG:    ECHOCARDIOGRAM independently visualized/reviewed and demonstrated :    1. Technically difficult image quality.   2. Left ventricular systolic function is severely decreased with an ejection fraction of 24 % by Gudino's method of disks.   3. There is severe (grade 3) left ventricular diastolic dysfunction, with elevated left ventricular filling pressure.   4. There is increased LV mass and concentric hypertrophy.   5. Normal right ventricular cavity size and mildly reduced right ventricular systolic function.   6. The left atrium is mildly dilated.   7. Mild mitral regurgitation.   8. Mild tricuspid regurgitation.   9. Mild aortic regurgitation.  10. Moderate aortic stenosis.  11. No prior echocardiogram is available for comparison.      STRESS TEST:    CARDIAC CATHETERIZATION:    ASSESSMENT AND PLAN:  In summary, FER JOHNSON is an 89 year old male with a PMHx of Afib on eliquis and HFrEF ([place] [facility] ([place] refused cath at that time) who [place] [facility] with [place] episode (due to possible SDH however found to by hygromas and cleared for anticoagulation by neurosurgery) found to have torsades on tele.   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  QTC adequate.  Can repeat EKG in AM to reassess.     Thank you for allowing [facility] to participate in the care of this patient.  Please feel free to call with any questions or concerns.

## 2024-07-29 NOTE — PROGRESS NOTE ADULT - SUBJECTIVE AND OBJECTIVE BOX
FER JOHNSON Patient is a 89y old  Male who presents with a chief complaint of AFIB with RVR with syncope and collapse (29 Jul 2024 09:34)     HPI:  89 year old male with known Afib  and systolic [facility] and collapse and fall and bruises face swollen lip bruised forehead and face nose - found to have a SDH and in HR of 120-150s - received lopressor and Diltiazem in ED .  doesn't know who his cardiologist denied CP or orthopnea or VELA or edema   he was by the front sink when he passed outas he turned-  he felt dizzy and lightheaded and turned around and fell face forward onto the ground with +LOC associated pain in upper mouth with loose tooth.   Denies vertigo , CP , Palpitations, headache, neck pain, vision changes, weakness, numbness, tingling.   Pt presented with Afib with RVR with stable BP with associated nausea and vomiting. CTH obtained upon arrival.  currently denies headache, nausea, vomiting, vision changes.   Endorses facial pain and left hand pain.  Repeat CT scan obtained which is stable without evidence of acute hemorrhage   he doesn't know his home meds- is pleasant but not a great historian- states he lives alone     Collateral hx from daughter Maritza-he has a"weak heart pumps "15%" his medications are Eliquis, Entresto and spironolactone and h takes the meds himself but she sets them up for him  he has been in his usual self -   discussed with neuro- OK to start  home Eliquis - Hygroma    (16 Jul 2024 08:47)    The patient was seen and evaluated - sitting in recliner- states "was put here few minutes ago" offers no new complaints -except states he has one thing or another - his fingers have been getting stiff on him   The patient is in no acute distress.  Denied any fever chest pain, palpitations,  abdominal pain, fever, dysuria,        I&O's Summary    28 Jul 2024 07:01  -  29 Jul 2024 07:00  --------------------------------------------------------  IN: 1240 mL / OUT: 1400 mL / NET: -160 mL    29 Jul 2024 07:01  -  29 Jul 2024 15:39  --------------------------------------------------------  IN: 595 mL / OUT: 300 mL / NET: 295 mL      Allergies    penicillins (Unknown)    Intolerances      HEALTH ISSUES - PROBLEM Dx:        PAST MEDICAL & SURGICAL HISTORY:  Afib              Vital Signs Last 24 Hrs  T(C): 36.6 (29 Jul 2024 07:50), Max: 37.2 (29 Jul 2024 00:45)  T(F): 97.8 (29 Jul 2024 07:50), Max: 98.9 (29 Jul 2024 00:45)  HR: 63 (29 Jul 2024 13:50) (56 - 77)  BP: 109/55 (29 Jul 2024 13:50) (98/53 - 124/56)  BP(mean): --  RR: 17 (29 Jul 2024 07:50) (16 - 18)  SpO2: 93% (29 Jul 2024 07:50) (93% - 100%)    Parameters below as of 29 Jul 2024 07:50  Patient On (Oxygen Delivery Method): room air    T(C): 36.6 (07-29-24 @ 07:50), Max: 37.2 (07-29-24 @ 00:45)  HR: 63 (07-29-24 @ 13:50) (56 - 77)  BP: 109/55 (07-29-24 @ 13:50) (98/53 - 124/56)  RR: 17 (07-29-24 @ 07:50) (16 - 18)  SpO2: 93% (07-29-24 @ 07:50) (93% - 100%)  Wt(kg): --    PHYSICAL EXAM:    GENERAL: NAD  HEAD:  Atraumatic, Normocephalic  EYES: EOMI, PERRL, conjunctiva and sclera clear  ENMT:  Moist mucous membranes,    NERVOUS SYSTEM:  Alert & Oriented X2, in recliner Moves upper and lower extremities; CNS-II-XII  CHEST/LUNG: Clear to auscultation bilaterally; No rales, rhonchi, wheezing,   HEART: Regular rate and rhythm; No murmurs,   ABDOMEN: Soft, Nontender, Nondistended; Bowel sounds present  EXTREMITIES: possitive nodes in both hands  Peripheral Pulses, No  cyanosis, or edema  psychiatry- mood and affect appropriate, Insight and judgement intact     aMIOdarone    Tablet 200 milliGRAM(s) Oral daily  apixaban 2.5 milliGRAM(s) Oral every 12 hours  atorvastatin 40 milliGRAM(s) Oral at bedtime  chlorhexidine 2% Cloths 1 Application(s) Topical daily  clopidogrel Tablet 75 milliGRAM(s) Oral daily  losartan 25 milliGRAM(s) Oral daily  metoprolol tartrate 25 milliGRAM(s) Oral three times a day  pantoprazole    Tablet 40 milliGRAM(s) Oral before breakfast  spironolactone 25 milliGRAM(s) Oral daily  tamsulosin 0.8 milliGRAM(s) Oral at bedtime      LABS:                          13.2   10.96 )-----------( 309      ( 28 Jul 2024 10:02 )             39.3     07-28    139  |  101  |  11.3  ----------------------------<  107<H>  4.3   |  24.0  |  0.83    Ca    9.0      28 Jul 2024 10:02  Phos  2.9     07-28  Mg     1.9     07-28        PT/INR - ( 28 Jul 2024 10:02 )   PT: 14.0 sec;   INR: 1.27 ratio         PTT - ( 28 Jul 2024 10:02 )  PTT:33.1 sec      Urinalysis Basic - ( 28 Jul 2024 10:02 )    Color: x / Appearance: x / SG: x / pH: x  Gluc: 107 mg/dL / Ketone: x  / Bili: x / Urobili: x   Blood: x / Protein: x / Nitrite: x   Leuk Esterase: x / RBC: x / WBC x   Sq Epi: x / Non Sq Epi: x / Bacteria: x      CAPILLARY BLOOD GLUCOSE          RADIOLOGY & ADDITIONAL TESTS:      Consultant notes reviewed    Case discussed with consultant/provider/ family /patient

## 2024-07-30 ENCOUNTER — TRANSCRIPTION ENCOUNTER (OUTPATIENT)
Age: 89
End: 2024-07-30

## 2024-07-30 VITALS
OXYGEN SATURATION: 98 % | HEART RATE: 78 BPM | RESPIRATION RATE: 18 BRPM | DIASTOLIC BLOOD PRESSURE: 55 MMHG | TEMPERATURE: 98 F | SYSTOLIC BLOOD PRESSURE: 100 MMHG

## 2024-07-30 LAB
ALBUMIN SERPL ELPH-MCNC: 3.1 G/DL — LOW (ref 3.3–5.2)
ALP SERPL-CCNC: 60 U/L — SIGNIFICANT CHANGE UP (ref 40–120)
ALT FLD-CCNC: 15 U/L — SIGNIFICANT CHANGE UP
ANION GAP SERPL CALC-SCNC: 10 MMOL/L — SIGNIFICANT CHANGE UP (ref 5–17)
AST SERPL-CCNC: 21 U/L — SIGNIFICANT CHANGE UP
BILIRUB SERPL-MCNC: 0.5 MG/DL — SIGNIFICANT CHANGE UP (ref 0.4–2)
BUN SERPL-MCNC: 15.7 MG/DL — SIGNIFICANT CHANGE UP (ref 8–20)
CALCIUM SERPL-MCNC: 8.6 MG/DL — SIGNIFICANT CHANGE UP (ref 8.4–10.5)
CHLORIDE SERPL-SCNC: 103 MMOL/L — SIGNIFICANT CHANGE UP (ref 96–108)
CO2 SERPL-SCNC: 27 MMOL/L — SIGNIFICANT CHANGE UP (ref 22–29)
CREAT SERPL-MCNC: 0.77 MG/DL — SIGNIFICANT CHANGE UP (ref 0.5–1.3)
EGFR: 87 ML/MIN/1.73M2 — SIGNIFICANT CHANGE UP
GLUCOSE SERPL-MCNC: 85 MG/DL — SIGNIFICANT CHANGE UP (ref 70–99)
HCT VFR BLD CALC: 35.9 % — LOW (ref 39–50)
HGB BLD-MCNC: 12.2 G/DL — LOW (ref 13–17)
MCHC RBC-ENTMCNC: 30 PG — SIGNIFICANT CHANGE UP (ref 27–34)
MCHC RBC-ENTMCNC: 34 GM/DL — SIGNIFICANT CHANGE UP (ref 32–36)
MCV RBC AUTO: 88.4 FL — SIGNIFICANT CHANGE UP (ref 80–100)
PLATELET # BLD AUTO: 290 K/UL — SIGNIFICANT CHANGE UP (ref 150–400)
POTASSIUM SERPL-MCNC: 4.2 MMOL/L — SIGNIFICANT CHANGE UP (ref 3.5–5.3)
POTASSIUM SERPL-SCNC: 4.2 MMOL/L — SIGNIFICANT CHANGE UP (ref 3.5–5.3)
PROT SERPL-MCNC: 6.3 G/DL — LOW (ref 6.6–8.7)
RBC # BLD: 4.06 M/UL — LOW (ref 4.2–5.8)
RBC # FLD: 15.5 % — HIGH (ref 10.3–14.5)
SODIUM SERPL-SCNC: 140 MMOL/L — SIGNIFICANT CHANGE UP (ref 135–145)
WBC # BLD: 7.41 K/UL — SIGNIFICANT CHANGE UP (ref 3.8–10.5)
WBC # FLD AUTO: 7.41 K/UL — SIGNIFICANT CHANGE UP (ref 3.8–10.5)

## 2024-07-30 PROCEDURE — 72125 CT NECK SPINE W/O DYE: CPT | Mod: MC

## 2024-07-30 PROCEDURE — 93005 ELECTROCARDIOGRAM TRACING: CPT

## 2024-07-30 PROCEDURE — 80053 COMPREHEN METABOLIC PANEL: CPT

## 2024-07-30 PROCEDURE — 82962 GLUCOSE BLOOD TEST: CPT

## 2024-07-30 PROCEDURE — 83605 ASSAY OF LACTIC ACID: CPT

## 2024-07-30 PROCEDURE — 85730 THROMBOPLASTIN TIME PARTIAL: CPT

## 2024-07-30 PROCEDURE — 87640 STAPH A DNA AMP PROBE: CPT

## 2024-07-30 PROCEDURE — 87045 FECES CULTURE AEROBIC BACT: CPT

## 2024-07-30 PROCEDURE — C1874: CPT

## 2024-07-30 PROCEDURE — 70486 CT MAXILLOFACIAL W/O DYE: CPT | Mod: MC

## 2024-07-30 PROCEDURE — 36415 COLL VENOUS BLD VENIPUNCTURE: CPT

## 2024-07-30 PROCEDURE — 82140 ASSAY OF AMMONIA: CPT

## 2024-07-30 PROCEDURE — 87507 IADNA-DNA/RNA PROBE TQ 12-25: CPT

## 2024-07-30 PROCEDURE — 70450 CT HEAD/BRAIN W/O DYE: CPT | Mod: MC

## 2024-07-30 PROCEDURE — 84295 ASSAY OF SERUM SODIUM: CPT

## 2024-07-30 PROCEDURE — C9601: CPT | Mod: LD

## 2024-07-30 PROCEDURE — 92610 EVALUATE SWALLOWING FUNCTION: CPT

## 2024-07-30 PROCEDURE — 82550 ASSAY OF CK (CPK): CPT

## 2024-07-30 PROCEDURE — 87641 MR-STAPH DNA AMP PROBE: CPT

## 2024-07-30 PROCEDURE — 51702 INSERT TEMP BLADDER CATH: CPT

## 2024-07-30 PROCEDURE — C9600: CPT | Mod: LD

## 2024-07-30 PROCEDURE — 71045 X-RAY EXAM CHEST 1 VIEW: CPT

## 2024-07-30 PROCEDURE — C1769: CPT

## 2024-07-30 PROCEDURE — 97530 THERAPEUTIC ACTIVITIES: CPT

## 2024-07-30 PROCEDURE — 84484 ASSAY OF TROPONIN QUANT: CPT

## 2024-07-30 PROCEDURE — 80048 BASIC METABOLIC PNL TOTAL CA: CPT

## 2024-07-30 PROCEDURE — 83735 ASSAY OF MAGNESIUM: CPT

## 2024-07-30 PROCEDURE — 99239 HOSP IP/OBS DSCHRG MGMT >30: CPT

## 2024-07-30 PROCEDURE — 93308 TTE F-UP OR LMTD: CPT

## 2024-07-30 PROCEDURE — 84132 ASSAY OF SERUM POTASSIUM: CPT

## 2024-07-30 PROCEDURE — 83036 HEMOGLOBIN GLYCOSYLATED A1C: CPT

## 2024-07-30 PROCEDURE — 87046 STOOL CULTR AEROBIC BACT EA: CPT

## 2024-07-30 PROCEDURE — 87493 C DIFF AMPLIFIED PROBE: CPT

## 2024-07-30 PROCEDURE — 87040 BLOOD CULTURE FOR BACTERIA: CPT

## 2024-07-30 PROCEDURE — 97116 GAIT TRAINING THERAPY: CPT

## 2024-07-30 PROCEDURE — 82272 OCCULT BLD FECES 1-3 TESTS: CPT

## 2024-07-30 PROCEDURE — C1725: CPT

## 2024-07-30 PROCEDURE — C1894: CPT

## 2024-07-30 PROCEDURE — 97163 PT EVAL HIGH COMPLEX 45 MIN: CPT

## 2024-07-30 PROCEDURE — 84100 ASSAY OF PHOSPHORUS: CPT

## 2024-07-30 PROCEDURE — 85018 HEMOGLOBIN: CPT

## 2024-07-30 PROCEDURE — C8929: CPT

## 2024-07-30 PROCEDURE — 85014 HEMATOCRIT: CPT

## 2024-07-30 PROCEDURE — 85025 COMPLETE CBC W/AUTO DIFF WBC: CPT

## 2024-07-30 PROCEDURE — 82330 ASSAY OF CALCIUM: CPT

## 2024-07-30 PROCEDURE — C1887: CPT

## 2024-07-30 PROCEDURE — 87077 CULTURE AEROBIC IDENTIFY: CPT

## 2024-07-30 PROCEDURE — 93458 L HRT ARTERY/VENTRICLE ANGIO: CPT | Mod: 59

## 2024-07-30 PROCEDURE — 82947 ASSAY GLUCOSE BLOOD QUANT: CPT

## 2024-07-30 PROCEDURE — 93321 DOPPLER ECHO F-UP/LMTD STD: CPT

## 2024-07-30 PROCEDURE — 99285 EMERGENCY DEPT VISIT HI MDM: CPT

## 2024-07-30 PROCEDURE — 85027 COMPLETE CBC AUTOMATED: CPT

## 2024-07-30 PROCEDURE — 84443 ASSAY THYROID STIM HORMONE: CPT

## 2024-07-30 PROCEDURE — 85610 PROTHROMBIN TIME: CPT

## 2024-07-30 PROCEDURE — 81001 URINALYSIS AUTO W/SCOPE: CPT

## 2024-07-30 PROCEDURE — 82803 BLOOD GASES ANY COMBINATION: CPT

## 2024-07-30 PROCEDURE — 80061 LIPID PANEL: CPT

## 2024-07-30 PROCEDURE — 87086 URINE CULTURE/COLONY COUNT: CPT

## 2024-07-30 PROCEDURE — 96375 TX/PRO/DX INJ NEW DRUG ADDON: CPT

## 2024-07-30 PROCEDURE — 96374 THER/PROPH/DIAG INJ IV PUSH: CPT

## 2024-07-30 PROCEDURE — 93010 ELECTROCARDIOGRAM REPORT: CPT

## 2024-07-30 PROCEDURE — 82435 ASSAY OF BLOOD CHLORIDE: CPT

## 2024-07-30 RX ORDER — ATORVASTATIN CALCIUM 40 MG/1
1 TABLET, FILM COATED ORAL
Qty: 0 | Refills: 0 | DISCHARGE
Start: 2024-07-30

## 2024-07-30 RX ORDER — CLOPIDOGREL BISULFATE 75 MG/1
1 TABLET, FILM COATED ORAL
Qty: 0 | Refills: 0 | DISCHARGE
Start: 2024-07-30

## 2024-07-30 RX ORDER — AMIODARONE HYDROCHLORIDE 50 MG/ML
0.5 INJECTION, SOLUTION INTRAVENOUS
Qty: 0 | Refills: 0 | DISCHARGE
Start: 2024-07-30

## 2024-07-30 RX ORDER — METOPROLOL TARTRATE 100 MG
1 TABLET ORAL
Qty: 0 | Refills: 0 | DISCHARGE
Start: 2024-07-30

## 2024-07-30 RX ORDER — FUROSEMIDE 10 MG/ML
1 INJECTION, SOLUTION INTRAVENOUS
Refills: 0 | DISCHARGE

## 2024-07-30 RX ORDER — TAMSULOSIN HCL 0.4 MG
2 CAPSULE ORAL
Qty: 0 | Refills: 0 | DISCHARGE
Start: 2024-07-30

## 2024-07-30 RX ORDER — LOSARTAN POTASSIUM 50 MG/1
1 TABLET, FILM COATED ORAL
Qty: 0 | Refills: 0 | DISCHARGE
Start: 2024-07-30

## 2024-07-30 RX ORDER — SACUBITRIL AND VALSARTAN 49; 51 MG/1; MG/1
1 TABLET, FILM COATED ORAL
Refills: 0 | DISCHARGE

## 2024-07-30 RX ORDER — PANTOPRAZOLE SODIUM 20 MG/1
1 TABLET, DELAYED RELEASE ORAL
Qty: 0 | Refills: 0 | DISCHARGE
Start: 2024-07-30

## 2024-07-30 RX ORDER — AMIODARONE HYDROCHLORIDE 50 MG/ML
100 INJECTION, SOLUTION INTRAVENOUS DAILY
Refills: 0 | Status: DISCONTINUED | OUTPATIENT
Start: 2024-07-30 | End: 2024-07-30

## 2024-07-30 RX ADMIN — SPIRONOLACTONE 25 MILLIGRAM(S): 50 TABLET, FILM COATED ORAL at 05:29

## 2024-07-30 RX ADMIN — Medication 25 MILLIGRAM(S): at 13:17

## 2024-07-30 RX ADMIN — CLOPIDOGREL BISULFATE 75 MILLIGRAM(S): 75 TABLET, FILM COATED ORAL at 08:50

## 2024-07-30 RX ADMIN — CHLORHEXIDINE GLUCONATE 1 APPLICATION(S): 500 CLOTH TOPICAL at 08:51

## 2024-07-30 RX ADMIN — Medication 25 MILLIGRAM(S): at 05:27

## 2024-07-30 RX ADMIN — APIXABAN 2.5 MILLIGRAM(S): 5 TABLET, FILM COATED ORAL at 08:51

## 2024-07-30 RX ADMIN — AMIODARONE HYDROCHLORIDE 200 MILLIGRAM(S): 50 INJECTION, SOLUTION INTRAVENOUS at 05:27

## 2024-07-30 RX ADMIN — PANTOPRAZOLE SODIUM 40 MILLIGRAM(S): 20 TABLET, DELAYED RELEASE ORAL at 05:27

## 2024-07-30 NOTE — PROGRESS NOTE ADULT - SUBJECTIVE AND OBJECTIVE BOX
[facility], located within [facility]                              [address] [place] [address]                                                 PHONE: [phone]                                                 FAX: [phone]  -----------------------------------------------------------------------------------------------  Pt seen and examined. FU for CAD    Overnight events/Complaints: Pt reports no palpitations. Back in NSR. No chest pain.    RELEVANT PHYSICAL EXAM:  Neck: No obvious JVD  Cardiovascular: irregular S1, S2  Respiratory: Lungs clear to auscultation; no crepitations, no wheeze  Musculoskeletal: No edema    Vital Signs Last 24 Hrs  T(C): 36.6 (30 Jul 2024 07:45), Max: 36.9 (30 Jul 2024 04:00)  T(F): 97.9 (30 Jul 2024 07:45), Max: 98.4 (30 Jul 2024 04:00)  HR: 61 (30 Jul 2024 07:45) (61 - 72)  BP: 99/61 (30 Jul 2024 07:45) (99/61 - 119/65)  BP(mean): 83 (29 Jul 2024 20:00) (72 - 83)  RR: 18 (30 Jul 2024 07:45) (17 - 18)  SpO2: 94% (30 Jul 2024 07:45) (92% - 98%)    Parameters below as of 30 Jul 2024 07:45  Patient On (Oxygen Delivery Method): room air      I&O's Summary    29 Jul 2024 07:01  -  30 Jul 2024 07:00  --------------------------------------------------------  IN: 1540 mL / OUT: 1900 mL / NET: -360 mL    30 Jul 2024 07:01  -  30 Jul 2024 12:30  --------------------------------------------------------  IN: 459 mL / OUT: 0 mL / NET: 459 mL            LABS:                        12.2   7.41  )-----------( 290      ( 30 Jul 2024 04:55 )             35.9     07-30    140  |  103  |  15.7  ----------------------------<  85  4.2   |  27.0  |  0.77    Ca    8.6      30 Jul 2024 04:55    TPro  6.3<L>  /  Alb  3.1<L>  /  TBili  0.5  /  DBili  x   /  AST  21  /  ALT  15  /  AlkPhos  60  07-30    RADIOLOGY & ADDITIONAL STUDIES: (reviewed)  CXR was independently visualized/reviewed  and demonstrated: Stable mild pulmonary vascular congestion.  No focal consolidation      CARDIOLOGY TESTING:(reviewed)     TELEMETRY independently visualized/reviewed and demonstrated : NSR    12 lead EKG independently visualized/reviewed  and demonstrated AF with RVR and Qtc 460 msec    ECHOCARDIOGRAM independently visualized/reviewed and demonstrated :    1. Technically difficult image quality.   2. Left ventricular systolic function is severely decreased with an ejection fraction of 24 % by Gudino's method of disks.   3. There is severe (grade 3) left ventricular diastolic dysfunction, with elevated left ventricular filling pressure.   4. There is increased LV mass and concentric hypertrophy.   5. Normal right ventricular cavity size and mildly reduced right ventricular systolic function.   6. The left atrium is mildly dilated.   7. Mild mitral regurgitation.   8. Mild tricuspid regurgitation.   9. Mild aortic regurgitation.  10. Moderate aortic stenosis.  11. No prior echocardiogram is available for comparison.      CARDIAC CATH:  < from: Cardiac Catheterization (07.19.24 @ 16:55) >  Procedures Performed   Procedures:              1.    Arterial Access - Right Radial     2.  Diagnostic Coronary Angiography   3.    Left Heart Cath   4.    PCI: POBA   5.    PCI: BABAK   6.    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 s/p PTCA and BABAK with  minicrush technique    LVEF 25% on echo   Moderate RCA and Ramus disease       MEDICATIONS:(reviewed)  MEDICATIONS  (STANDING):  aMIOdarone    Tablet 200 milliGRAM(s) Oral daily  apixaban 2.5 milliGRAM(s) Oral every 12 hours  atorvastatin 40 milliGRAM(s) Oral at bedtime  chlorhexidine 2% Cloths 1 Application(s) Topical daily  clopidogrel Tablet 75 milliGRAM(s) Oral daily  losartan 25 milliGRAM(s) Oral daily  metoprolol tartrate 25 milliGRAM(s) Oral three times a day  pantoprazole    Tablet 40 milliGRAM(s) Oral before breakfast  spironolactone 25 milliGRAM(s) Oral daily  tamsulosin 0.8 milliGRAM(s) Oral at bedtime    ASSESSMENT AND PLAN:    89 year old male with a PMHx of Afib on eliquis and HFrEF (diagnosed 6 weeks ago at [facility]) (patient refused cath at that time) who [place] [facility] with syncopal episode (due to possible SDH however found to by hygromas and cleared for anticoagulation by neurosurgery) now found to have torsades on tele.   TTE LV EF ~ 25% see above  s/p LHC Severe stenosis of proxima/mid LAD and D1 s/p PTCA and BABAK with minicrush technique with Moderate RCA and Ramus disease       Episodes of AF with RVR. Mexiletine discontinued and amiodarone discontinued due to prolonged QTc  Continue metoprolol to 25 mg TID with losartan  Continue Plavix with Eliquis  Continue low dose amiodarone 100 mg daily on discharge. Would discontinue amiodarone if Qtc > 500 msec

## 2024-07-30 NOTE — PROGRESS NOTE ADULT - PROVIDER SPECIALTY LIST ADULT
Cardiology
Hospitalist
Neurosurgery
Cardiology
Critical Care
Hospitalist
Hospitalist
Cardiology
Critical Care
Critical Care
Electrophysiology
Electrophysiology
Hospitalist

## 2024-07-30 NOTE — PROGRESS NOTE ADULT - REASON FOR ADMISSION
AFIB with RVR with syncope and collapse

## 2024-07-30 NOTE — DISCHARGE NOTE NURSING/CASE MANAGEMENT/SOCIAL WORK - NSTOBACCONEVERSMOKERY/N_GEN_A
Caller: Barrett Laguerre    Relationship: Self     Best call back number: [phone]     Requested Prescriptions:   Requested Prescriptions     Pending Prescriptions Disp Refills   • FLUoxetine (PROzac) 40 MG capsule [Pharmacy Med Name: FLUoxetine HCl 40 MG Oral Capsule] 180 capsule 0     Sig: Take 1 capsule by mouth twice daily        Pharmacy where request should be sent: [facility] [address] [place] [address] [phone] [facility] [phone] FX     Additional details provided by patient: PATIENT HAS ONE DAY LEFT    Does the patient have less than a 3 day supply:  [x] Yes  [] No    Radha Cabrera Rep   12/27/21 16:35 EST        No

## 2024-07-30 NOTE — DISCHARGE NOTE NURSING/CASE MANAGEMENT/SOCIAL WORK - PATIENT PORTAL LINK FT
You can access the FollowMyHealth Patient Portal offered by [facility] by registering at the following website: [email or web address]. By joining [employer]’s FollowMyHealth portal, you will also be able to view your health information using other applications (apps) compatible with our system.
